# Patient Record
Sex: MALE | Race: WHITE | NOT HISPANIC OR LATINO | Employment: FULL TIME | URBAN - METROPOLITAN AREA
[De-identification: names, ages, dates, MRNs, and addresses within clinical notes are randomized per-mention and may not be internally consistent; named-entity substitution may affect disease eponyms.]

---

## 2023-06-13 ENCOUNTER — TELEPHONE (OUTPATIENT)
Dept: CARDIOLOGY | Facility: CLINIC | Age: 63
End: 2023-06-13

## 2023-06-13 ENCOUNTER — OFFICE VISIT (OUTPATIENT)
Dept: CARDIOLOGY | Facility: CLINIC | Age: 63
End: 2023-06-13
Payer: COMMERCIAL

## 2023-06-13 VITALS
WEIGHT: 194.2 LBS | HEIGHT: 70 IN | RESPIRATION RATE: 20 BRPM | HEART RATE: 71 BPM | DIASTOLIC BLOOD PRESSURE: 90 MMHG | SYSTOLIC BLOOD PRESSURE: 130 MMHG | BODY MASS INDEX: 27.8 KG/M2

## 2023-06-13 DIAGNOSIS — E78.2 MIXED HYPERLIPIDEMIA: ICD-10-CM

## 2023-06-13 DIAGNOSIS — I10 ELEVATED BLOOD PRESSURE READING IN OFFICE WITH DIAGNOSIS OF HYPERTENSION: ICD-10-CM

## 2023-06-13 DIAGNOSIS — R06.02 SHORTNESS OF BREATH: ICD-10-CM

## 2023-06-13 DIAGNOSIS — R94.31 ABNORMAL EKG: Primary | ICD-10-CM

## 2023-06-13 DIAGNOSIS — R00.2 PALPITATIONS: ICD-10-CM

## 2023-06-13 DIAGNOSIS — R73.9 HYPERGLYCEMIA: ICD-10-CM

## 2023-06-13 PROCEDURE — 3080F DIAST BP >= 90 MM HG: CPT | Performed by: INTERNAL MEDICINE

## 2023-06-13 PROCEDURE — 3008F BODY MASS INDEX DOCD: CPT | Performed by: INTERNAL MEDICINE

## 2023-06-13 PROCEDURE — 3075F SYST BP GE 130 - 139MM HG: CPT | Performed by: INTERNAL MEDICINE

## 2023-06-13 PROCEDURE — 93000 ELECTROCARDIOGRAM COMPLETE: CPT | Mod: XU | Performed by: INTERNAL MEDICINE

## 2023-06-13 PROCEDURE — 99204 OFFICE O/P NEW MOD 45 MIN: CPT | Performed by: INTERNAL MEDICINE

## 2023-06-13 RX ORDER — IBUPROFEN 200 MG
200 TABLET ORAL 2 TIMES DAILY PRN
COMMUNITY
End: 2023-07-21 | Stop reason: HOSPADM

## 2023-06-13 RX ORDER — CHOLECALCIFEROL (VITAMIN D3) 25 MCG
1000 TABLET ORAL DAILY
COMMUNITY

## 2023-06-13 ASSESSMENT — ENCOUNTER SYMPTOMS
ORTHOPNEA: 0
LIGHT-HEADEDNESS: 0
SYNCOPE: 0
NEAR-SYNCOPE: 0
DYSPNEA ON EXERTION: 1
SNORING: 1
IRREGULAR HEARTBEAT: 0
PND: 0
DIZZINESS: 0
PALPITATIONS: 1

## 2023-06-13 NOTE — ASSESSMENT & PLAN NOTE
Vishnu had a lipid profile in 2019 in which his total cholesterol was 236, triglycerides 161, HDL was 50 and LDL was 54.    I will have him get a repeat lipid profile.  I have counseled him on a low-fat low-cholesterol diet and given him information about the Mediterranean diet.    I encouraged him to get back to his structured exercise program.    I have also recommended that if his stress test and echo are unremarkable then he should get CT coronary calcium scoring for further risk stratification.

## 2023-06-13 NOTE — ASSESSMENT & PLAN NOTE
His dyspnea may be related to deconditioning.  He he tells me that he had previously been doing the [employer] bike 3-4 times per week up until February when he started a new job.    I will have my get an exercise nuclear stress test to look for any evidence of ischemia as a cause for his symptoms.  He will also get a transthoracic echocardiogram to assess his underlying LV function wall motion and valves.    His EKG today in the office revealed normal sinus rhythm, left atrial enlargement and RSR prime in V1 but was otherwise unremarkable.

## 2023-06-13 NOTE — PROGRESS NOTES
Cardiology Consult/New Patient    Reason for visit: Palpitations    EMMA Murillo presents today for evaluation.  He is a 62-year-old male with history of hyperlipidemia who had gotten up from bed the other night and had palpitations.  He states that it felt like his heart was racing.  He felt lightheaded and dizzy.  No shortness of breath or chest discomfort.  No neurologic or TIA-like symptoms.  He went back to bed and whenever he laid on his left side his symptoms were worse.  When he lied on his back or on his right side his symptoms improved but did not resolve.  The symptoms resolved on their own but the next day he felt weak, tired and states that he looked pale when he looked in the mirror.  His symptoms improved throughout the day and he had been golfing.  He did get some dyspnea on exertion but no chest discomfort.    He had an episode of palpitations several years ago but this was more severe.    He does drink a 5-hour energy drink every day and I will wean him off of this.    Chidi does get some dyspnea on exertion but no chest discomfort, PND, orthopnea, or lower extremity edema.  He has not been to see a physician since he had an appendectomy in 2019.    Vishnu does not have a history of hypertension although his blood pressure is mildly elevated today in the office at 130/90 in both arms.  There is no diabetes, smoking history or family history of premature atherosclerosis or sudden cardiac death.    Medical History:   Past Medical History:   Diagnosis Date   • Mixed hyperlipidemia 6/13/2023   • Palpitations 06/12/2023   • SOB (shortness of breath)        Surgical History:   Past Surgical History:   Procedure Laterality Date   • APPENDECTOMY      2019       Family History:   Family History   Problem Relation Age of Onset   • Multiple sclerosis Biological Mother    • Hyperlipidemia Biological Father    • Hypertension Biological Father    • Diabetes Biological Father         Social History:    Social  History     Tobacco Use   • Smoking status: Never   • Smokeless tobacco: Never   Vaping Use   • Vaping Use: Never used   Substance Use Topics   • Alcohol use: Yes     Comment: socially   • Drug use: Never        Allergies: Augmentin [amoxicillin-pot clavulanate]    Current Outpatient Medications   Medication Instructions   • cholecalciferol (vitamin D3) 1,000 Units, oral, Daily   • ibuprofen (MOTRIN) 200 mg, oral, 2 times daily PRN        Review of Systems  Review of Systems   Cardiovascular: Positive for dyspnea on exertion and palpitations. Negative for chest pain, irregular heartbeat, leg swelling, near-syncope, orthopnea, paroxysmal nocturnal dyspnea and syncope.   Respiratory: Positive for snoring.    Neurological: Negative for dizziness and light-headedness.       Objective     Vitals:    06/13/23 0809   BP: 130/90   Pulse: 71   Resp: 20       Physical Exam  Constitutional:       Appearance: He is well-developed.   Neck:      Vascular: No carotid bruit or JVD.   Cardiovascular:      Rate and Rhythm: Normal rate and regular rhythm.      Pulses:           Carotid pulses are 2+ on the right side and 2+ on the left side.       Dorsalis pedis pulses are 2+ on the right side and 2+ on the left side.        Posterior tibial pulses are 2+ on the right side and 2+ on the left side.      Heart sounds: S1 normal and S2 normal. No murmur heard.     No friction rub. No gallop.   Pulmonary:      Effort: Pulmonary effort is normal.      Breath sounds: Normal breath sounds.   Musculoskeletal:      Left lower leg: No edema.   Skin:     General: Skin is warm and dry.   Neurological:      Mental Status: He is alert.   Psychiatric:         Behavior: Behavior normal.            ECG.  Normal sinus rhythm, RSR prime in V1 and left atrial enlargement.      Assessment/Plan     Palpitations  In order to further evaluate Chidi's symptoms, I will have him wear a Zio patch monitor for 1 week.    He also has not had any blood work in 4  years and I will have him get a CBC, TSH, hemoglobin A1c and CMP.    He will get an echocardiogram to look for any underlying structural heart disease and to assess the size of his left atrium with the possibility of atrial fibrillation, because his symptoms.    I also discussed with him decreasing his caffeine intake.    He does snore but has not had any apneic periods and after his cardiac work-up, I feel that he would benefit from a sleep study for obstructive sleep apnea as he does get fatigued during the day which is why he takes the 5-hour energy drink.    Shortness of breath  His dyspnea may be related to deconditioning.  He he tells me that he had previously been doing the [employer] bike 3-4 times per week up until February when he started a new job.    I will have my get an exercise nuclear stress test to look for any evidence of ischemia as a cause for his symptoms.  He will also get a transthoracic echocardiogram to assess his underlying LV function wall motion and valves.    His EKG today in the office revealed normal sinus rhythm, left atrial enlargement and RSR prime in V1 but was otherwise unremarkable.    Mixed hyperlipidemia  Vishnu had a lipid profile in 2019 in which his total cholesterol was 236, triglycerides 161, HDL was 50 and LDL was 54.    I will have him get a repeat lipid profile.  I have counseled him on a low-fat low-cholesterol diet and given him information about the Mediterranean diet.    I encouraged him to get back to his structured exercise program.    I have also recommended that if his stress test and echo are unremarkable then he should get CT coronary calcium scoring for further risk stratification.    Elevated blood pressure reading in office with diagnosis of hypertension  Chidi's blood pressure was mildly elevated today in the office of 130/90.  Given that this is his first visit, and may be related to anxiety I have not started him on any medication at this time.  I have  counseled him on decreasing the salt in his diet, exercise and weight loss.    He will get a follow-up blood pressure check at the time of his stress test and echocardiogram.    We had a prolonged discussion about these complex clinical issues and went over the various important aspects to consider. All questions were answered.      Chidi will follow up with me after the tests are performed.  I will keep you informed the results as well as his progress.  He will call with any questions or concerns in the interim.    I have also recommended that Chidi get a primary care physician and I have given him the name and number of an internist for him to see.    I spent 45 minutes on this date of service performing the following activities: obtaining history, performing examination, entering orders, documenting, preparing for visit, obtaining / reviewing records, providing counseling and education and communicating results.         Chidi Pham MD  6/13/2023

## 2023-06-13 NOTE — TELEPHONE ENCOUNTER
Pls precert for pharm nuc in [place] 6/19; and    Echo in [facility] on 7/17.  [employer].  Thanks

## 2023-06-13 NOTE — ASSESSMENT & PLAN NOTE
Chidi's blood pressure was mildly elevated today in the office of 130/90.  Given that this is his first visit, and may be related to anxiety I have not started him on any medication at this time.  I have counseled him on decreasing the salt in his diet, exercise and weight loss.    He will get a follow-up blood pressure check at the time of his stress test and echocardiogram.

## 2023-06-13 NOTE — ASSESSMENT & PLAN NOTE
In order to further evaluate Chidi's symptoms, I will have him wear a Zio patch monitor for 1 week.    He also has not had any blood work in 4 years and I will have him get a CBC, TSH, hemoglobin A1c and CMP.    He will get an echocardiogram to look for any underlying structural heart disease and to assess the size of his left atrium with the possibility of atrial fibrillation, because his symptoms.    I also discussed with him decreasing his caffeine intake.    He does snore but has not had any apneic periods and after his cardiac work-up, I feel that he would benefit from a sleep study for obstructive sleep apnea as he does get fatigued during the day which is why he takes the 5-hour energy drink.

## 2023-06-13 NOTE — PATIENT INSTRUCTIONS
Patient Education   Mediterranean Diet  A Mediterranean diet refers to food and lifestyle choices that are based on the traditions of countries located on the [place]. This way of eating has been shown to help prevent certain conditions and improve outcomes forpeople who have chronic diseases, like kidney disease and heart disease.  What are tips for following this plan?  Lifestyle  Cook and eat meals together with your family, when possible.  Drink enough fluid to keep your urine clear or pale yellow.  Be physically active every day. This includes:  Aerobic exercise like running or swimming.  Leisure activities like gardening, walking, or housework.  Get 7-8 hours of sleep each night.  If recommended by your health care provider, drink red wine in moderation. This means 1 glass a day for nonpregnant women and 2 glasses a day for men. A glass of wine equals 5 oz (150 mL).  Reading food labels    Check the serving size of packaged foods. For foods such as rice and pasta, the serving size refers to the amount of cooked product, not dry.  Check the total fat in packaged foods. Avoid foods that have saturated fat or trans fats.  Check the ingredients list for added sugars, such as corn syrup.    Shopping  At the grocery store, buy most of your food from the areas near the walls of the store. This includes:  Fresh fruits and vegetables (produce).  Grains, beans, nuts, and seeds. Some of these may be available in unpackaged forms or large amounts (in bulk).  Fresh seafood.  Poultry and eggs.  Low-fat dairy products.  Buy whole ingredients instead of prepackaged foods.  Buy fresh fruits and vegetables in-season from local farmers markets.  Buy frozen fruits and vegetables in resealable bags.  If you do not have access to quality fresh seafood, buy precooked frozen shrimp or canned fish, such as tuna, salmon, or sardines.  Buy small amounts of raw or cooked vegetables, salads, or olives from the deli or salad bar  at your store.  Stock your pantry so you always have certain foods on hand, such as olive oil, canned tuna, canned tomatoes, rice, pasta, and beans.  Cooking  Cook foods with extra-virgin olive oil instead of using butter or other vegetable oils.  Have meat as a side dish, and have vegetables or grains as your main dish. This means having meat in small portions or adding small amounts of meat to foods like pasta or stew.  Use beans or vegetables instead of meat in common dishes like chili or lasagna.  [place] with different cooking methods. Try roasting or broiling vegetables instead of steaming or sautéeing them.  Add frozen vegetables to soups, stews, pasta, or rice.  Add nuts or seeds for added healthy fat at each meal. You can add these to yogurt, salads, or vegetable dishes.  Marinate fish or vegetables using olive oil, lemon juice, garlic, and fresh herbs.  Meal planning    Plan to eat 1 vegetarian meal one day each week. Try to work up to 2 vegetarian meals, if possible.  Eat seafood 2 or more times a week.  Have healthy snacks readily available, such as:  Vegetable sticks with hummus.  Greek yogurt.  Fruit and nut trail mix.  Eat balanced meals throughout the week. This includes:  Fruit: 2-3 servings a day  Vegetables: 4-5 servings a day  Low-fat dairy: 2 servings a day  Fish, poultry, or lean meat: 1 serving a day  Beans and legumes: 2 or more servings a week  Nuts and seeds: 1-2 servings a day  Whole grains: 6-8 servings a day  Extra-virgin olive oil: 3-4 servings a day  Limit red meat and sweets to only a few servings a month    What are my food choices?  Mediterranean diet  Recommended  Grains: Whole-grain pasta. Brown rice. Bulgar wheat. Polenta. Couscous. Whole-wheat bread. Oatmeal. Quinoa.  Vegetables: Artichokes. Beets. Broccoli. Cabbage. Carrots. Eggplant. Green beans. Chard. Kale. Spinach. Onions. Leeks. Peas. Squash. Tomatoes. Peppers. Radishes.  Fruits: Apples. Apricots. Avocado. Berries.  Bananas. Cherries. Dates. Figs. Grapes. Rosey. Melon. Oranges. Peaches. Plums. Pomegranate.  Meats and other protein foods: Beans. Almonds. Sunflower seeds. Pine nuts. Peanuts. Cod. [place]. Scallops. Shrimp. Tuna. Tilapia. Clams. Oysters. Eggs.  Dairy: Low-fat milk. Cheese. Greek yogurt.  Beverages: Water. Red wine. Herbal tea.  Fats and oils: Extra virgin olive oil. Avocado oil. Grape seed oil.  Sweets and desserts: Greek yogurt with honey. Baked apples. Poached pears. Trail mix.  Seasoning and other foods: Basil. Cilantro. Coriander. Cumin. Mint. Parsley. Celso. Rosemary. Tarragon. Garlic. Oregano. Thyme. Pepper. Balsalmic vinegar. Tahini. Hummus. Tomato sauce. Olives. Mushrooms.  Limit these  Grains: Prepackaged pasta or rice dishes. Prepackaged cereal with added sugar.  Vegetables: Deep fried potatoes (french fries).  Fruits: Fruit canned in syrup.  Meats and other protein foods: Beef. Pork. Lamb. Poultry with skin. Hot dogs. [place].  Dairy: Ice cream. Sour cream. Whole milk.  Beverages: Juice. Sugar-sweetened soft drinks. Beer. Liquor and spirits.  Fats and oils: Butter. Canola oil. Vegetable oil. Beef fat (tallow). Lard.  Sweets and desserts: Cookies. Cakes. Pies. Candy.  Seasoning and other foods: Mayonnaise. Premade sauces and marinades.  The items listed may not be a complete list. Talk with your dietitian aboutwhat dietary choices are right for you.  Summary  The Mediterranean diet includes both food and lifestyle choices.  Eat a variety of fresh fruits and vegetables, beans, nuts, seeds, and whole grains.  Limit the amount of red meat and sweets that you eat.  Talk with your health care provider about whether it is safe for you to drink red wine in moderation. This means 1 glass a day for nonpregnant women and 2 glasses a day for men. A glass of wine equals 5 oz (150 mL).  This information is not intended to replace advice given to you by your health care provider. Make sure you discuss any questions you  have with your healthcare provider.  Document Revised: 08/17/2017 Document Reviewed: 08/10/2017  Elsevier Patient Education © 2020 Elsevier Inc.

## 2023-06-14 NOTE — TELEPHONE ENCOUNTER
NST @ [facility]: NPR per Nicole via ph #[phone]. Call ref #[id number]    Echo @ [facility]: ERICA per Nicole via ph #[phone]. Call ref #[id number]

## 2023-06-16 LAB
BASOPHILS # BLD AUTO: 0 X10E3/UL (ref 0–0.2)
BASOPHILS NFR BLD AUTO: 1 %
EOSINOPHIL # BLD AUTO: 0.2 X10E3/UL (ref 0–0.4)
EOSINOPHIL NFR BLD AUTO: 3 %
ERYTHROCYTE [DISTWIDTH] IN BLOOD BY AUTOMATED COUNT: 12.3 % (ref 11.6–15.4)
HBA1C MFR BLD: 5.3 % (ref 4.8–5.6)
HCT VFR BLD AUTO: 43.1 % (ref 37.5–51)
HGB BLD-MCNC: 14.8 G/DL (ref 13–17.7)
IMM GRANULOCYTES # BLD AUTO: 0 X10E3/UL (ref 0–0.1)
IMM GRANULOCYTES NFR BLD AUTO: 0 %
LYMPHOCYTES # BLD AUTO: 2.3 X10E3/UL (ref 0.7–3.1)
LYMPHOCYTES NFR BLD AUTO: 38 %
MCH RBC QN AUTO: 31.5 PG (ref 26.6–33)
MCHC RBC AUTO-ENTMCNC: 34.3 G/DL (ref 31.5–35.7)
MCV RBC AUTO: 92 FL (ref 79–97)
MONOCYTES # BLD AUTO: 0.6 X10E3/UL (ref 0.1–0.9)
MONOCYTES NFR BLD AUTO: 11 %
NEUTROPHILS # BLD AUTO: 2.8 X10E3/UL (ref 1.4–7)
NEUTROPHILS NFR BLD AUTO: 47 %
PLATELET # BLD AUTO: 248 X10E3/UL (ref 150–450)
RBC # BLD AUTO: 4.7 X10E6/UL (ref 4.14–5.8)
WBC # BLD AUTO: 5.9 X10E3/UL (ref 3.4–10.8)

## 2023-06-17 LAB
ALBUMIN SERPL-MCNC: 4.3 G/DL (ref 3.8–4.8)
ALBUMIN/GLOB SERPL: 1.9 {RATIO} (ref 1.2–2.2)
ALP SERPL-CCNC: 93 IU/L (ref 44–121)
ALT SERPL-CCNC: 17 IU/L (ref 0–44)
AST SERPL-CCNC: 21 IU/L (ref 0–40)
BILIRUB SERPL-MCNC: 0.4 MG/DL (ref 0–1.2)
BUN SERPL-MCNC: 13 MG/DL (ref 8–27)
BUN/CREAT SERPL: 14 (ref 10–24)
CALCIUM SERPL-MCNC: 8.9 MG/DL (ref 8.6–10.2)
CHLORIDE SERPL-SCNC: 102 MMOL/L (ref 96–106)
CHOLEST SERPL-MCNC: 184 MG/DL (ref 100–199)
CO2 SERPL-SCNC: 24 MMOL/L (ref 20–29)
CREAT SERPL-MCNC: 0.93 MG/DL (ref 0.76–1.27)
EGFRCR SERPLBLD CKD-EPI 2021: 93 ML/MIN/1.73
GLOBULIN SER CALC-MCNC: 2.3 G/DL (ref 1.5–4.5)
GLUCOSE SERPL-MCNC: 99 MG/DL (ref 70–99)
HDLC SERPL-MCNC: 50 MG/DL
LDLC SERPL CALC-MCNC: 103 MG/DL (ref 0–99)
POTASSIUM SERPL-SCNC: 4.2 MMOL/L (ref 3.5–5.2)
PROT SERPL-MCNC: 6.6 G/DL (ref 6–8.5)
SODIUM SERPL-SCNC: 138 MMOL/L (ref 134–144)
TRIGL SERPL-MCNC: 177 MG/DL (ref 0–149)
TSH SERPL DL<=0.005 MIU/L-ACNC: 4.05 UIU/ML (ref 0.45–4.5)
VLDLC SERPL CALC-MCNC: 31 MG/DL (ref 5–40)

## 2023-06-19 ENCOUNTER — APPOINTMENT (OUTPATIENT)
Dept: CARDIOLOGY | Facility: CLINIC | Age: 63
Setting detail: NUCLEAR MEDICINE
End: 2023-06-19
Attending: INTERNAL MEDICINE
Payer: COMMERCIAL

## 2023-06-19 ENCOUNTER — HOSPITAL ENCOUNTER (OUTPATIENT)
Dept: CARDIOLOGY | Facility: CLINIC | Age: 63
Setting detail: NUCLEAR MEDICINE
Discharge: HOME | End: 2023-06-19
Attending: INTERNAL MEDICINE
Payer: COMMERCIAL

## 2023-06-19 VITALS — BODY MASS INDEX: 27.77 KG/M2 | WEIGHT: 194 LBS | HEIGHT: 70 IN

## 2023-06-19 DIAGNOSIS — R00.2 PALPITATIONS: ICD-10-CM

## 2023-06-19 DIAGNOSIS — R06.02 SHORTNESS OF BREATH: ICD-10-CM

## 2023-06-19 DIAGNOSIS — R94.31 ABNORMAL EKG: ICD-10-CM

## 2023-06-19 LAB
CV NM TETROFOSMIN REST DOSE: 9.5 MCI
CV NM TETROFOSMIN STRESS DOSE: 31.4 MCI
MLH CV NM REST ADMIN DATE: NORMAL MM/DD/YYYY
MLH CV NM REST ADMIN TIME: 748 HR:MIN
MLH CV NM STRESS ADMIN DATE: NORMAL MM/DD/YYYY
MLH CV NM STRESS ADMIN TIME: 852 HR:MIN
NUC STRESS EJECTION FRACTION: 55 %
STRESS BASELINE BP: NORMAL MMHG
STRESS BASELINE HR: 65 BPM
STRESS ECHO POST RECOVERY HR: 111 BPM
STRESS PERCENT HR: 86 %
STRESS POST ESTIMATED WORKLOAD: 10.1 METS
STRESS POST EXERCISE DUR MIN: 7 MIN
STRESS POST EXERCISE DUR SEC: 45 SEC
STRESS POST PEAK BP: NORMAL MMHG
STRESS POST PEAK HR: 136 BPM
STRESS ST DEPRESSION: 2 MM
STRESS TARGET HR: 134 BPM

## 2023-06-19 PROCEDURE — A9502 TC99M TETROFOSMIN: HCPCS | Performed by: INTERNAL MEDICINE

## 2023-06-19 PROCEDURE — 93015 CV STRESS TEST SUPVJ I&R: CPT | Performed by: INTERNAL MEDICINE

## 2023-06-19 PROCEDURE — 78452 HT MUSCLE IMAGE SPECT MULT: CPT | Performed by: INTERNAL MEDICINE

## 2023-06-26 ENCOUNTER — OFFICE VISIT (OUTPATIENT)
Dept: CARDIOLOGY | Facility: CLINIC | Age: 63
End: 2023-06-26
Payer: COMMERCIAL

## 2023-06-26 ENCOUNTER — TELEPHONE (OUTPATIENT)
Dept: CARDIOLOGY | Facility: CLINIC | Age: 63
End: 2023-06-26
Payer: COMMERCIAL

## 2023-06-26 VITALS
SYSTOLIC BLOOD PRESSURE: 114 MMHG | HEART RATE: 79 BPM | RESPIRATION RATE: 16 BRPM | HEIGHT: 70 IN | WEIGHT: 194 LBS | DIASTOLIC BLOOD PRESSURE: 84 MMHG | BODY MASS INDEX: 27.77 KG/M2 | OXYGEN SATURATION: 98 %

## 2023-06-26 DIAGNOSIS — R00.2 PALPITATIONS: ICD-10-CM

## 2023-06-26 DIAGNOSIS — E78.2 MIXED HYPERLIPIDEMIA: ICD-10-CM

## 2023-06-26 DIAGNOSIS — R94.39 ABNORMAL NUCLEAR STRESS TEST: ICD-10-CM

## 2023-06-26 DIAGNOSIS — R06.02 SHORTNESS OF BREATH: Primary | ICD-10-CM

## 2023-06-26 DIAGNOSIS — I10 ELEVATED BLOOD PRESSURE READING IN OFFICE WITH DIAGNOSIS OF HYPERTENSION: ICD-10-CM

## 2023-06-26 PROCEDURE — 3079F DIAST BP 80-89 MM HG: CPT | Performed by: INTERNAL MEDICINE

## 2023-06-26 PROCEDURE — 93000 ELECTROCARDIOGRAM COMPLETE: CPT | Performed by: INTERNAL MEDICINE

## 2023-06-26 PROCEDURE — 99215 OFFICE O/P EST HI 40 MIN: CPT | Performed by: INTERNAL MEDICINE

## 2023-06-26 PROCEDURE — 3074F SYST BP LT 130 MM HG: CPT | Performed by: INTERNAL MEDICINE

## 2023-06-26 PROCEDURE — 3008F BODY MASS INDEX DOCD: CPT | Performed by: INTERNAL MEDICINE

## 2023-06-26 ASSESSMENT — ENCOUNTER SYMPTOMS
DIZZINESS: 0
IRREGULAR HEARTBEAT: 0
LIGHT-HEADEDNESS: 0
PALPITATIONS: 0
NEAR-SYNCOPE: 0
ORTHOPNEA: 0
SYNCOPE: 0
PND: 0
DYSPNEA ON EXERTION: 1

## 2023-06-26 NOTE — PATIENT INSTRUCTIONS
Patient Education   Coronary Angiogram With Stent  Coronary angiogram with stent placement is a procedure to widen or open a narrow blood vessel of the heart (coronary artery). Arteries may become blocked by cholesterol buildup (plaques) in the lining of the artery wall. When a coronary artery becomes partially blocked, blood flow to that area decreases. This may lead to chest pain or a heart attack (myocardial infarction).  A stent is a small piece of metal that looks like mesh or spring. Stent placement may be done as treatment after a heart attack, or to prevent a heartattack if a blocked artery is found by a coronary angiogram.  Let your health care provider know about:  Any allergies you have, including allergies to medicines or contrast dye.  All medicines you are taking, including vitamins, herbs, eye drops, creams, and over-the-counter medicines.  Any problems you or family members have had with anesthetic medicines.  Any blood disorders you have.  Any surgeries you have had.  Any medical conditions you have, including kidney problems or kidney failure.  Whether you are pregnant or may be pregnant.  Whether you are breastfeeding.  What are the risks?  Generally, this is a safe procedure. However, serious problems may occur, including:  Damage to nearby structures or organs, such as the heart, blood vessels, or kidneys.  A return of blockage.  Bleeding, infection, or bruising at the insertion site.  A collection of blood under the skin (hematoma) at the insertion site.  A blood clot in another part of the body.  Allergic reaction to medicines or dyes.  Bleeding into the abdomen (retroperitoneal bleeding).  Stroke (rare).  Heart attack (rare).  What happens before the procedure?  Staying hydrated  Follow instructions from your health care provider about hydration, which may include:  Up to 2 hours before the procedure - you may continue to drink clear liquids, such as water, clear fruit juice, black coffee,  and plain tea.    Eating and drinking restrictions  Follow instructions from your health care provider about eating and drinking, which may include:  8 hours before the procedure - stop eating heavy meals or foods, such as meat, fried foods, or fatty foods.  6 hours before the procedure - stop eating light meals or foods, such as toast or cereal.  2 hours before the procedure - stop drinking clear liquids.  Medicines  Ask your health care provider about:  Changing or stopping your regular medicines. This is especially important if you are taking diabetes medicines or blood thinners.  Taking medicines such as aspirin and ibuprofen. These medicines can thin your blood. Do not take these medicines unless your health care provider tells you to take them.  Generally, aspirin is recommended before a thin tube, called a catheter, is passed through a blood vessel and inserted into the heart (cardiac catheterization).  Taking over-the-counter medicines, vitamins, herbs, and supplements.  General instructions  Do not use any products that contain nicotine or tobacco for at least 4 weeks before the procedure. These products include cigarettes, e-cigarettes, and chewing tobacco. If you need help quitting, ask your health care provider.  Plan to have someone take you home from the hospital or clinic.  If you will be going home right after the procedure, plan to have someone with you for 24 hours.  You may have tests and imaging procedures.  Ask your health care provider:  How your insertion site will be marked. Ask which artery will be used for the procedure.  What steps will be taken to help prevent infection. These may include:  Removing hair at the insertion site.  Washing skin with a germ-killing soap.  Taking antibiotic medicine.  What happens during the procedure?    An IV will be inserted into one of your veins.  Electrodes may be placed on your chest to monitor your heart rate during the procedure.  You will be given one  or more of the following:  A medicine to help you relax (sedative).  A medicine to numb the area (local anesthetic) for catheter insertion.  A small incision will be made for catheter insertion.  The catheter will be inserted into an artery using a guide wire. The location may be in your groin, your wrist, or the fold of your arm (near your elbow).  An X-ray procedure (fluoroscopy) will be used to help guide the catheter to the opening of the heart arteries.  A dye will be injected into the catheter. X-rays will be taken. The dye helps to show where any narrowing or blockages are located in the arteries.  Tell your health care provider if you have chest pain or trouble breathing.  A tiny wire will be guided to the blocked spot, and a balloon will be inflated to make the artery wider.  The stent will be expanded to crush the plaques into the wall of the vessel. The stent will hold the area open and improve the blood flow. Most stents have a drug coating to reduce the risk of the stent narrowing over time.  The artery may be made wider using a drill, laser, or other tools that remove plaques.  The catheter will be removed when the blood flow improves. The stent will stay where it was placed, and the lining of the artery will grow over it.  A bandage (dressing) will be placed on the insertion site. Pressure will be applied to stop bleeding.  The IV will be removed.  This procedure may vary among health care providers and hospitals.  What happens after the procedure?  Your blood pressure, heart rate, breathing rate, and blood oxygen level will be monitored until you leave the hospital or clinic.  If the procedure is done through the leg, you will lie flat in bed for a few hours or for as long as told by your health care provider. You will be instructed not to bend or cross your legs.  The insertion site and the pulse in your foot or wrist will be checked often.  You may have more blood tests, X-rays, and a test that  records the electrical activity of your heart (electrocardiogram, or ECG).  Do not drive for 24 hours if you were given a sedative during your procedure.  Summary  Coronary angiogram with stent placement is a procedure to widen or open a narrowed coronary artery. This is done to treat heart problems.  Before the procedure, let your health care provider know about all the medical conditions and surgeries you have or have had.  This is a safe procedure. However, some problems may occur, including damage to nearby structures or organs, bleeding, blood clots, or allergies.  Follow your health care provider's instructions about eating, drinking, medicines, and other lifestyle changes, such as quitting tobacco use before the procedure.  This information is not intended to replace advice given to you by your health care provider. Make sure you discuss any questions you have with your healthcare provider.  Document Revised: 07/08/2020 Document Reviewed: 07/08/2020  Elsevier Patient Education © 2022 Elsevier Inc.

## 2023-06-26 NOTE — ASSESSMENT & PLAN NOTE
Given like symptoms, abnormal stress test, we will plan for him to undergo left heart cath and coronary angiography.  I will arrange for him to have this performed at [facility].  I have discussed the risks and benefits.  I have given him information about cardiac catheterization and PCI.

## 2023-06-26 NOTE — ASSESSMENT & PLAN NOTE
Recent lipid profile revealed a total cholesterol of 184, triglycerides 177, HDL was 50 and LDL was 103.    I counseled Chidi on the continued need for following a Mediterranean diet and beginning a structured exercise program after his cardiac catheterization.

## 2023-06-26 NOTE — H&P (VIEW-ONLY)
Cardiology Note       Reason for visit: Abnormal nuclear stress test      Vishnu returns today for follow-up accompanied by his wife.  He had undergone an exercise nuclear stress test for atypical chest pain and shortness of breath.  The stress test was abnormal in both the EKG and nuclear imaging or positive for ischemia.  There was a moderate size anterior septal defect with mild reversibility as well in the apical septum.  The ejection fraction was 55%.    Clinically, Vishnu has been getting some dyspnea on exertion and he felt that he had some left upper chest tightness over the weekend with exertion.  He has not had any rest or nocturnal symptoms.    I had called Chidi and reviewed the results with him and he came in today and we discussed further diagnostic and treatment options including medical management, CT coronary angiography and left heart cath and cardiac catheterization.  I explained the risk, benefits of each.  He understands and would like to proceed with cardiac catheterization.        Past Medical History:   Diagnosis Date   • Mixed hyperlipidemia 6/13/2023   • Palpitations 06/12/2023   • SOB (shortness of breath)      Past Surgical History:   Procedure Laterality Date   • APPENDECTOMY      2019     Social History     Tobacco Use   • Smoking status: Never   • Smokeless tobacco: Never   Vaping Use   • Vaping Use: Never used   Substance Use Topics   • Alcohol use: Yes     Comment: socially   • Drug use: Never      Family History   Problem Relation Age of Onset   • Multiple sclerosis Biological Mother    • Hyperlipidemia Biological Father    • Hypertension Biological Father    • Diabetes Biological Father      Augmentin [amoxicillin-pot clavulanate]  Current Outpatient Medications   Medication Instructions   • acetylcysteine (NAC ORAL) 1,000 mg, oral, Daily   • ascorbic acid/multivit-min (EMERGEN-C ORAL) 1 packet, oral, Daily   • cholecalciferol (vitamin D3) 1,000 Units, oral, Daily   • ibuprofen  (MOTRIN) 200 mg, oral, 2 times daily PRN   • mv-min/vit C/glut/lysine/hb124 (IMMUNE SUPPORT ORAL) 1 tablet, oral, Daily   • vitamin B complex (B COMPLEX ORAL) 1 tablet, oral, Daily          Review of Systems   Cardiovascular: Positive for dyspnea on exertion. Negative for chest pain, irregular heartbeat, leg swelling, near-syncope, orthopnea, palpitations, paroxysmal nocturnal dyspnea and syncope.   Neurological: Negative for dizziness and light-headedness.      Objective    Vitals:    06/26/23 0814   BP: 114/84   Pulse: 79   Resp: 16   SpO2: 98%      Wt Readings from Last 3 Encounters:   06/26/23 88 kg (194 lb)   06/19/23 88 kg (194 lb)   06/13/23 88.1 kg (194 lb 3.2 oz)      Physical Exam  Constitutional:       Appearance: He is well-developed.   Neck:      Vascular: No carotid bruit or JVD.   Cardiovascular:      Rate and Rhythm: Normal rate and regular rhythm.      Pulses:           Carotid pulses are 2+ on the right side and 2+ on the left side.       Dorsalis pedis pulses are 2+ on the right side and 2+ on the left side.        Posterior tibial pulses are 2+ on the right side and 2+ on the left side.      Heart sounds: S1 normal and S2 normal. No murmur heard.     No friction rub. No gallop.   Pulmonary:      Effort: Pulmonary effort is normal.      Breath sounds: Normal breath sounds.   Skin:     General: Skin is warm and dry.   Neurological:      Mental Status: He is alert.   Psychiatric:         Behavior: Behavior normal.                     Exercise nuclear stress test.  6/19/2023.  • This is an abnormal exercise nuclear stress test.  • EKG with 2-2.5 mm upsloping ST depression in the inferolateral leads starting at 6:16 of exercise.  Lateral lead ST depression appears slightly more horizontal.  This persisted until approximately 2 minutes of recovery.  • No exercise-induced arrhythmia.  Good exercise capacity.  • Perfusion images are technically difficult secondary to artifact.  • There is a  moderate-sized area of moderately to severely reduced isotope uptake in the anteroseptum with mild reversibility at rest.  • There is a small area of moderately reduced isotope uptake in the apical septum with mild improvement at rest.  • The inferior wall has mildly reduced uptake on resting images only.  • LVEF 55%.  Grossly normal wall motion.  • Overall findings are suggestive of ischemia in the territory of the left anterior descending coronary artery.    ECG.  Normal sinus rhythm, RSR prime in V1 and nonspecific T wave abnormality.  Compared to previous EKG nonspecific T wave abnormality is now present.     Assessment/Plan    Elevated blood pressure reading in office with diagnosis of hypertension  Blood pressure today in the office was well controlled.  We will continue to monitor him clinically.    Shortness of breath  Given like symptoms, abnormal stress test, we will plan for him to undergo left heart cath and coronary angiography.  I will arrange for him to have this performed at [facility].  I have discussed the risks and benefits.  I have given him information about cardiac catheterization and PCI.    Mixed hyperlipidemia  Recent lipid profile revealed a total cholesterol of 184, triglycerides 177, HDL was 50 and LDL was 103.    I counseled Chidi on the continued need for following a Mediterranean diet and beginning a structured exercise program after his cardiac catheterization.    Palpitations  No recent palpitations.  Chidi will undergo cardiac catheterization for further evaluation given his abnormal stress test.    We had a prolonged discussion about these complex clinical issues and went over the various important aspects to consider. All questions were answered.    Chidi will follow up with me after the cardiac catheterization.  I will keep you informed the results as well as his progress.  He will call with any questions or concerns in the interim.            Thank you for allowing me to  participate in the care of this patient.  If you have any questions please don't hesitate to contact me.    I spent 45 minutes on this date of service performing the following activities: obtaining history, performing examination, entering orders, documenting, preparing for visit, obtaining / reviewing records, providing counseling and education and communicating results.     Chidi Pham MD [facility]   6/26/2023  9:11 AM

## 2023-06-26 NOTE — PROGRESS NOTES
Cardiology Note       Reason for visit: Abnormal nuclear stress test      Vishnu returns today for follow-up accompanied by his wife.  He had undergone an exercise nuclear stress test for atypical chest pain and shortness of breath.  The stress test was abnormal in both the EKG and nuclear imaging or positive for ischemia.  There was a moderate size anterior septal defect with mild reversibility as well in the apical septum.  The ejection fraction was 55%.    Clinically, Vishnu has been getting some dyspnea on exertion and he felt that he had some left upper chest tightness over the weekend with exertion.  He has not had any rest or nocturnal symptoms.    I had called Chidi and reviewed the results with him and he came in today and we discussed further diagnostic and treatment options including medical management, CT coronary angiography and left heart cath and cardiac catheterization.  I explained the risk, benefits of each.  He understands and would like to proceed with cardiac catheterization.        Past Medical History:   Diagnosis Date   • Mixed hyperlipidemia 6/13/2023   • Palpitations 06/12/2023   • SOB (shortness of breath)      Past Surgical History:   Procedure Laterality Date   • APPENDECTOMY      2019     Social History     Tobacco Use   • Smoking status: Never   • Smokeless tobacco: Never   Vaping Use   • Vaping Use: Never used   Substance Use Topics   • Alcohol use: Yes     Comment: socially   • Drug use: Never      Family History   Problem Relation Age of Onset   • Multiple sclerosis Biological Mother    • Hyperlipidemia Biological Father    • Hypertension Biological Father    • Diabetes Biological Father      Augmentin [amoxicillin-pot clavulanate]  Current Outpatient Medications   Medication Instructions   • acetylcysteine (NAC ORAL) 1,000 mg, oral, Daily   • ascorbic acid/multivit-min (EMERGEN-C ORAL) 1 packet, oral, Daily   • cholecalciferol (vitamin D3) 1,000 Units, oral, Daily   • ibuprofen  (MOTRIN) 200 mg, oral, 2 times daily PRN   • mv-min/vit C/glut/lysine/hb124 (IMMUNE SUPPORT ORAL) 1 tablet, oral, Daily   • vitamin B complex (B COMPLEX ORAL) 1 tablet, oral, Daily          Review of Systems   Cardiovascular: Positive for dyspnea on exertion. Negative for chest pain, irregular heartbeat, leg swelling, near-syncope, orthopnea, palpitations, paroxysmal nocturnal dyspnea and syncope.   Neurological: Negative for dizziness and light-headedness.      Objective    Vitals:    06/26/23 0814   BP: 114/84   Pulse: 79   Resp: 16   SpO2: 98%      Wt Readings from Last 3 Encounters:   06/26/23 88 kg (194 lb)   06/19/23 88 kg (194 lb)   06/13/23 88.1 kg (194 lb 3.2 oz)      Physical Exam  Constitutional:       Appearance: He is well-developed.   Neck:      Vascular: No carotid bruit or JVD.   Cardiovascular:      Rate and Rhythm: Normal rate and regular rhythm.      Pulses:           Carotid pulses are 2+ on the right side and 2+ on the left side.       Dorsalis pedis pulses are 2+ on the right side and 2+ on the left side.        Posterior tibial pulses are 2+ on the right side and 2+ on the left side.      Heart sounds: S1 normal and S2 normal. No murmur heard.     No friction rub. No gallop.   Pulmonary:      Effort: Pulmonary effort is normal.      Breath sounds: Normal breath sounds.   Skin:     General: Skin is warm and dry.   Neurological:      Mental Status: He is alert.   Psychiatric:         Behavior: Behavior normal.                     Exercise nuclear stress test.  6/19/2023.  • This is an abnormal exercise nuclear stress test.  • EKG with 2-2.5 mm upsloping ST depression in the inferolateral leads starting at 6:16 of exercise.  Lateral lead ST depression appears slightly more horizontal.  This persisted until approximately 2 minutes of recovery.  • No exercise-induced arrhythmia.  Good exercise capacity.  • Perfusion images are technically difficult secondary to artifact.  • There is a  moderate-sized area of moderately to severely reduced isotope uptake in the anteroseptum with mild reversibility at rest.  • There is a small area of moderately reduced isotope uptake in the apical septum with mild improvement at rest.  • The inferior wall has mildly reduced uptake on resting images only.  • LVEF 55%.  Grossly normal wall motion.  • Overall findings are suggestive of ischemia in the territory of the left anterior descending coronary artery.    ECG.  Normal sinus rhythm, RSR prime in V1 and nonspecific T wave abnormality.  Compared to previous EKG nonspecific T wave abnormality is now present.     Assessment/Plan    Elevated blood pressure reading in office with diagnosis of hypertension  Blood pressure today in the office was well controlled.  We will continue to monitor him clinically.    Shortness of breath  Given like symptoms, abnormal stress test, we will plan for him to undergo left heart cath and coronary angiography.  I will arrange for him to have this performed at [facility].  I have discussed the risks and benefits.  I have given him information about cardiac catheterization and PCI.    Mixed hyperlipidemia  Recent lipid profile revealed a total cholesterol of 184, triglycerides 177, HDL was 50 and LDL was 103.    I counseled Chidi on the continued need for following a Mediterranean diet and beginning a structured exercise program after his cardiac catheterization.    Palpitations  No recent palpitations.  Chidi will undergo cardiac catheterization for further evaluation given his abnormal stress test.    We had a prolonged discussion about these complex clinical issues and went over the various important aspects to consider. All questions were answered.    Chidi will follow up with me after the cardiac catheterization.  I will keep you informed the results as well as his progress.  He will call with any questions or concerns in the interim.            Thank you for allowing me to  participate in the care of this patient.  If you have any questions please don't hesitate to contact me.    I spent 45 minutes on this date of service performing the following activities: obtaining history, performing examination, entering orders, documenting, preparing for visit, obtaining / reviewing records, providing counseling and education and communicating results.     Chidi Pham MD [facility]   6/26/2023  9:11 AM

## 2023-06-26 NOTE — ASSESSMENT & PLAN NOTE
No recent palpitations.  Chidi will undergo cardiac catheterization for further evaluation given his abnormal stress test.

## 2023-06-26 NOTE — ASSESSMENT & PLAN NOTE
Blood pressure today in the office was well controlled.  We will continue to monitor him clinically.

## 2023-06-27 DIAGNOSIS — R94.31 ABNORMAL EKG: ICD-10-CM

## 2023-06-27 DIAGNOSIS — R06.02 SHORTNESS OF BREATH: ICD-10-CM

## 2023-06-27 DIAGNOSIS — R00.2 PALPITATIONS: ICD-10-CM

## 2023-07-06 ENCOUNTER — HOSPITAL ENCOUNTER (OUTPATIENT)
Facility: HOSPITAL | Age: 63
Setting detail: HOSPITAL OUTPATIENT SURGERY
Discharge: HOME | End: 2023-07-06
Attending: INTERNAL MEDICINE | Admitting: INTERNAL MEDICINE
Payer: COMMERCIAL

## 2023-07-06 ENCOUNTER — APPOINTMENT (OUTPATIENT)
Dept: RADIOLOGY | Facility: HOSPITAL | Age: 63
Setting detail: HOSPITAL OUTPATIENT SURGERY
End: 2023-07-06
Attending: NURSE PRACTITIONER
Payer: COMMERCIAL

## 2023-07-06 ENCOUNTER — APPOINTMENT (OUTPATIENT)
Dept: CARDIOLOGY | Facility: HOSPITAL | Age: 63
Setting detail: HOSPITAL OUTPATIENT SURGERY
End: 2023-07-06
Attending: STUDENT IN AN ORGANIZED HEALTH CARE EDUCATION/TRAINING PROGRAM
Payer: COMMERCIAL

## 2023-07-06 VITALS
OXYGEN SATURATION: 97 % | DIASTOLIC BLOOD PRESSURE: 81 MMHG | HEART RATE: 66 BPM | RESPIRATION RATE: 19 BRPM | HEIGHT: 70 IN | BODY MASS INDEX: 27.06 KG/M2 | WEIGHT: 189 LBS | SYSTOLIC BLOOD PRESSURE: 125 MMHG

## 2023-07-06 DIAGNOSIS — R06.02 SHORTNESS OF BREATH: ICD-10-CM

## 2023-07-06 DIAGNOSIS — R94.39 ABNORMAL NUCLEAR STRESS TEST: ICD-10-CM

## 2023-07-06 DIAGNOSIS — I25.10 CORONARY ARTERY DISEASE INVOLVING NATIVE CORONARY ARTERY OF NATIVE HEART WITHOUT ANGINA PECTORIS: Primary | ICD-10-CM

## 2023-07-06 LAB
ABO + RH BLD: NORMAL
ALBUMIN SERPL-MCNC: 4 G/DL (ref 3.5–5.7)
ALP SERPL-CCNC: 69 IU/L (ref 34–104)
ALT SERPL-CCNC: 17 IU/L (ref 7–52)
ANION GAP SERPL CALC-SCNC: 5 MEQ/L (ref 3–15)
AORTIC ROOT ANNULUS - M-MODE: 2.8 CM
APTT PPP: 33 SEC (ref 23–35)
AST SERPL-CCNC: 21 IU/L (ref 13–39)
AV PEAK GRADIENT: 4 MMHG
AV PEAK VELOCITY-S: 1.04 M/S
BILIRUB SERPL-MCNC: 0.5 MG/DL (ref 0.3–1)
BLD GP AB SCN SERPL QL: NEGATIVE
BLOOD BANK CMNT PATIENT-IMP: NORMAL
BNP SERPL-MCNC: 29 PG/ML
BSA FOR ECHO PROCEDURE: 2.06 M2
BUN SERPL-MCNC: 13 MG/DL (ref 7–25)
CALCIUM SERPL-MCNC: 8.6 MG/DL (ref 8.6–10.3)
CHLORIDE SERPL-SCNC: 106 MEQ/L (ref 98–107)
CO2 SERPL-SCNC: 28 MEQ/L (ref 21–31)
CREAT SERPL-MCNC: 0.9 MG/DL (ref 0.7–1.3)
CUSP SEPARATION: 2.2 CM
D AG BLD QL: NEGATIVE
E WAVE DECELERATION TIME: 187 MS
E/A RATIO: 0.8
E/E' RATIO: 9.4
E/LAT E' RATIO: 5
ERYTHROCYTE [DISTWIDTH] IN BLOOD BY AUTOMATED COUNT: 11.9 % (ref 11.6–14.4)
FRACTIONAL SHORTENING: 21.68 %
GFR SERPL CREATININE-BSD FRML MDRD: >60 ML/MIN/1.73M*2
GLUCOSE SERPL-MCNC: 105 MG/DL (ref 70–99)
HCT VFR BLDCO AUTO: 40.4 % (ref 40.1–51)
HEART RATE: 65 BPM
HGB BLD-MCNC: 14.3 G/DL (ref 13.7–17.5)
INR PPP: 1.1
INTERVENTRICULAR SEPTUM: 0.66 CM
LA/AORTA RATIO: 1.36
LABORATORY COMMENT REPORT: NORMAL
LAD 2D - M-MODE: 3.8 CM
LEFT INTERNAL DIMENSION IN SYSTOLE: 3.36 CM (ref 3.03–4.59)
LEFT VENTRICULAR INTERNAL DIMENSION IN DIASTOLE: 4.29 CM (ref 5.16–7.17)
LEFT VENTRICULAR POSTERIOR WALL IN END DIASTOLE: 0.81 CM (ref 0.66–1.24)
LVOT MG: 2 MMHG
LVOT MV: 0.62 M/S
LVOT PEAK VELOCITY: 0.87 M/S
LVOT PG: 3 MMHG
LVOT VTI: 20.9 CM
MCH RBC QN AUTO: 31.8 PG (ref 28–33.2)
MCHC RBC AUTO-ENTMCNC: 35.4 G/DL (ref 32.2–36.5)
MCV RBC AUTO: 89.8 FL (ref 83–98)
MRSA DNA SPEC QL NAA+PROBE: NEGATIVE
MV E'TISSUE VEL-LAT: 0.12 M/S
MV E'TISSUE VEL-MED: 0.07 M/S
MV PEAK A VEL: 0.74 M/S
MV PEAK E VEL: 0.62 M/S
PDW BLD AUTO: 9.4 FL (ref 9.4–12.4)
PLATELET # BLD AUTO: 230 K/UL (ref 150–350)
POSTERIOR WALL: 0.81 CM
POTASSIUM SERPL-SCNC: 4.1 MEQ/L (ref 3.5–5.1)
PROT SERPL-MCNC: 6.3 G/DL (ref 6.4–8.9)
PROTHROMBIN TIME: 14.3 SEC (ref 12.2–14.5)
RBC # BLD AUTO: 4.5 M/UL (ref 4.5–5.8)
RVOT VMAX: 0.71 M/S
RVOT VTI: 13.4 CM
SODIUM SERPL-SCNC: 139 MEQ/L (ref 136–145)
SPECIMEN EXP DATE BLD: NORMAL
T4 FREE SERPL-MCNC: 0.88 NG/DL (ref 0.58–1.64)
TSH SERPL DL<=0.05 MIU/L-ACNC: 2.85 MIU/L (ref 0.34–5.6)
WBC # BLD AUTO: 5.33 K/UL (ref 3.8–10.5)
Z-SCORE OF LEFT VENTRICULAR DIMENSION IN END DIASTOLE: -3.49
Z-SCORE OF LEFT VENTRICULAR DIMENSION IN END SYSTOLE: -0.82
Z-SCORE OF LEFT VENTRICULAR POSTERIOR WALL IN END DIASTOLE: -0.57

## 2023-07-06 PROCEDURE — 85027 COMPLETE CBC AUTOMATED: CPT | Performed by: NURSE PRACTITIONER

## 2023-07-06 PROCEDURE — 25000000 HC PHARMACY GENERAL: Performed by: INTERNAL MEDICINE

## 2023-07-06 PROCEDURE — C8929 TTE W OR WO FOL WCON,DOPPLER: HCPCS

## 2023-07-06 PROCEDURE — 36415 COLL VENOUS BLD VENIPUNCTURE: CPT | Performed by: THORACIC SURGERY (CARDIOTHORACIC VASCULAR SURGERY)

## 2023-07-06 PROCEDURE — 83880 ASSAY OF NATRIURETIC PEPTIDE: CPT | Performed by: NURSE PRACTITIONER

## 2023-07-06 PROCEDURE — 93880 EXTRACRANIAL BILAT STUDY: CPT

## 2023-07-06 PROCEDURE — 93458 L HRT ARTERY/VENTRICLE ANGIO: CPT | Mod: 26 | Performed by: INTERNAL MEDICINE

## 2023-07-06 PROCEDURE — 63600000 HC DRUGS/DETAIL CODE: Performed by: INTERNAL MEDICINE

## 2023-07-06 PROCEDURE — 93458 L HRT ARTERY/VENTRICLE ANGIO: CPT | Performed by: INTERNAL MEDICINE

## 2023-07-06 PROCEDURE — 71046 X-RAY EXAM CHEST 2 VIEWS: CPT

## 2023-07-06 PROCEDURE — C1894 INTRO/SHEATH, NON-LASER: HCPCS | Performed by: INTERNAL MEDICINE

## 2023-07-06 PROCEDURE — 99153 MOD SED SAME PHYS/QHP EA: CPT | Performed by: INTERNAL MEDICINE

## 2023-07-06 PROCEDURE — C1769 GUIDE WIRE: HCPCS | Performed by: INTERNAL MEDICINE

## 2023-07-06 PROCEDURE — 4A023N7 MEASUREMENT OF CARDIAC SAMPLING AND PRESSURE, LEFT HEART, PERCUTANEOUS APPROACH: ICD-10-PCS | Performed by: INTERNAL MEDICINE

## 2023-07-06 PROCEDURE — 84443 ASSAY THYROID STIM HORMONE: CPT | Performed by: NURSE PRACTITIONER

## 2023-07-06 PROCEDURE — 71000001 HC PACU PHASE 1 INITIAL 30MIN: Performed by: INTERNAL MEDICINE

## 2023-07-06 PROCEDURE — 80053 COMPREHEN METABOLIC PANEL: CPT | Performed by: NURSE PRACTITIONER

## 2023-07-06 PROCEDURE — 84439 ASSAY OF FREE THYROXINE: CPT | Performed by: NURSE PRACTITIONER

## 2023-07-06 PROCEDURE — 27200000 HC STERILE SUPPLY: Performed by: INTERNAL MEDICINE

## 2023-07-06 PROCEDURE — B2111ZZ FLUOROSCOPY OF MULTIPLE CORONARY ARTERIES USING LOW OSMOLAR CONTRAST: ICD-10-PCS | Performed by: INTERNAL MEDICINE

## 2023-07-06 PROCEDURE — 87641 MR-STAPH DNA AMP PROBE: CPT | Performed by: NURSE PRACTITIONER

## 2023-07-06 PROCEDURE — 71000011 HC PACU PHASE 1 EA ADDL MIN: Performed by: INTERNAL MEDICINE

## 2023-07-06 PROCEDURE — C1887 CATHETER, GUIDING: HCPCS | Performed by: INTERNAL MEDICINE

## 2023-07-06 PROCEDURE — 63600105 HC IODINE BASED CONTRAST: Performed by: INTERNAL MEDICINE

## 2023-07-06 PROCEDURE — 99152 MOD SED SAME PHYS/QHP 5/>YRS: CPT | Performed by: INTERNAL MEDICINE

## 2023-07-06 PROCEDURE — 63700000 HC SELF-ADMINISTRABLE DRUG: Performed by: INTERNAL MEDICINE

## 2023-07-06 PROCEDURE — 86900 BLOOD TYPING SEROLOGIC ABO: CPT

## 2023-07-06 PROCEDURE — 93005 ELECTROCARDIOGRAM TRACING: CPT | Performed by: NURSE PRACTITIONER

## 2023-07-06 PROCEDURE — 93306 TTE W/DOPPLER COMPLETE: CPT | Mod: 26 | Performed by: INTERNAL MEDICINE

## 2023-07-06 PROCEDURE — 25500000 HC DRUGS/INCIDENT RAD: Mod: JZ | Performed by: INTERNAL MEDICINE

## 2023-07-06 PROCEDURE — 85610 PROTHROMBIN TIME: CPT | Performed by: NURSE PRACTITIONER

## 2023-07-06 PROCEDURE — 99999 PR OFFICE/OUTPT VISIT,PROCEDURE ONLY: CPT | Performed by: NURSE PRACTITIONER

## 2023-07-06 PROCEDURE — 99205 OFFICE O/P NEW HI 60 MIN: CPT | Performed by: THORACIC SURGERY (CARDIOTHORACIC VASCULAR SURGERY)

## 2023-07-06 PROCEDURE — 85730 THROMBOPLASTIN TIME PARTIAL: CPT | Performed by: NURSE PRACTITIONER

## 2023-07-06 RX ORDER — ASPIRIN 325 MG
325 TABLET ORAL ONCE
Status: COMPLETED | OUTPATIENT
Start: 2023-07-06 | End: 2023-07-06

## 2023-07-06 RX ORDER — ROSUVASTATIN CALCIUM 20 MG/1
20 TABLET, FILM COATED ORAL DAILY
Qty: 30 TABLET | Refills: 3 | Status: ON HOLD | OUTPATIENT
Start: 2023-07-06 | End: 2023-07-18 | Stop reason: SDUPTHER

## 2023-07-06 RX ORDER — LIDOCAINE HYDROCHLORIDE 10 MG/ML
INJECTION, SOLUTION INFILTRATION; PERINEURAL
Status: DISCONTINUED | OUTPATIENT
Start: 2023-07-06 | End: 2023-07-06 | Stop reason: HOSPADM

## 2023-07-06 RX ORDER — MIDAZOLAM HYDROCHLORIDE 2 MG/2ML
INJECTION, SOLUTION INTRAMUSCULAR; INTRAVENOUS
Status: DISCONTINUED | OUTPATIENT
Start: 2023-07-06 | End: 2023-07-06 | Stop reason: HOSPADM

## 2023-07-06 RX ORDER — ASPIRIN 81 MG/1
81 TABLET ORAL DAILY
Qty: 30 TABLET | Refills: 3 | Status: ON HOLD | OUTPATIENT
Start: 2023-07-06 | End: 2023-07-18 | Stop reason: SDUPTHER

## 2023-07-06 RX ORDER — IODIXANOL 320 MG/ML
INJECTION, SOLUTION INTRAVASCULAR
Status: DISCONTINUED | OUTPATIENT
Start: 2023-07-06 | End: 2023-07-06 | Stop reason: HOSPADM

## 2023-07-06 RX ORDER — HEPARIN SODIUM 1000 [USP'U]/ML
INJECTION, SOLUTION INTRAVENOUS; SUBCUTANEOUS
Status: DISCONTINUED | OUTPATIENT
Start: 2023-07-06 | End: 2023-07-06 | Stop reason: HOSPADM

## 2023-07-06 RX ORDER — FENTANYL CITRATE 50 UG/ML
INJECTION, SOLUTION INTRAMUSCULAR; INTRAVENOUS
Status: DISCONTINUED | OUTPATIENT
Start: 2023-07-06 | End: 2023-07-06 | Stop reason: HOSPADM

## 2023-07-06 RX ADMIN — ASPIRIN 325 MG: 325 TABLET ORAL at 06:57

## 2023-07-06 RX ADMIN — PERFLUTREN: 6.52 INJECTION, SUSPENSION INTRAVENOUS at 11:22

## 2023-07-06 NOTE — Clinical Note
Patient placed on procedure table in supine position with right arm extended. Positioning devices: all pressure points padded and safety strap applied.

## 2023-07-06 NOTE — PRE-PROCEDURE NOTE
Cardiac Cath Lab Pre-procedure Note    - Patient was seen and examined at bedside.  - The patient's chart and all data was reviewed.  - The procedure, treatment alternatives, risks and benefits were explained with specific risks discussed.  - Patient was consented for cardiac cath procedure and possible PCI.    Indication for procedure: Pre-Op Diagnosis Codes:     * Shortness of breath (R06.02)     * Abnormal nuclear stress test (R94.39)    Patient's clinical presentation to the cardiac cath lab: stable ischemic symptoms.    Patient is at risk for stroke due to extensive atherosclerotic vascular disease; acute myocardial infarction; bleeding; renal failure potentially requiring dialysis due to the presence of pre-procedural abnormal renal function and allergic reaction including anaphylaxis due to known history of allergy to contrast agents containing iodine.   Patient appears to be managing well.     Notable Non-Invasive Cardiac Testing    - Stress Test: positive nuclear stress test, 6/19/23  • This is an abnormal exercise nuclear stress test.  • EKG with 2-2.5 mm upsloping ST depression in the inferolateral leads starting at 6:16 of exercise.  Lateral lead ST depression appears slightly more horizontal.  This persisted until approximately 2 minutes of recovery.  • No exercise-induced arrhythmia.  Good exercise capacity.  • Perfusion images are technically difficult secondary to artifact.  • There is a moderate-sized area of moderately to severely reduced isotope uptake in the anteroseptum with mild reversibility at rest.  • There is a small area of moderately reduced isotope uptake in the apical septum with mild improvement at rest.  • The inferior wall has mildly reduced uptake on resting images only.  • LVEF 55%.  Grossly normal wall motion.  • Overall findings are suggestive of ischemia in the territory of the left anterior descending coronary artery.    Total anti-anginal medications: none.     Patient is  presenting today with no CHF.    Pre-sedation assessment  ASA 3  Mallampati class: II - soft palate, uvula, fauces visible.

## 2023-07-06 NOTE — POST-PROCEDURE NOTE
Monitor and chaka dc'd in preparation for discharge     Discharge instructions reviewed with patient & spouse,  Medication teaching done, denies further questions     R radial cath site care education completed, patient verbalized  understanding     R radial cath site D&I  no bleeding or hematoma noted, 2x2 gauze/tegaderm dressing D&I, arm board in place   Patient discharged to home

## 2023-07-06 NOTE — CONSULTS
Cardiac Surgery Consult Note    Subjective     Chidi Ramsey is a 62 y.o. male who was admitted for Shortness of breath [R06.02]  Abnormal nuclear stress test [R94.39]. Patient was referred by Dr. West Bai for Robotic revascularization of CCAD of the Ostial LAD.   Patient is 62 y.o. male with a Pmhx significant for elevated triglycerides who presented today to undergo an elective heart catheterization due to abnormal stress test findings. Prior to today patient had been experiencing atypical chest pain and shortness of breath prompting further evaluation by his Cardiologist Dr. Pham. On June 13 he underwent a Nuclear stress test which showed  moderate size anterior septal defect with mild reversibility as well in the apical septum with an EF of 55%. Of note he also had a 6 day trial with a ZIO patch for recent episode of palpitations which was notable for a 12 beat run of V-tach. Today he underwent a LHC which demonstrated 100% ostial LAD chronic total occlusion with left to left and right to left collaterals and mild nonobstructive disease in the remaining coronary tree. CTS was subsequently consulted evaluation of Robotic revascularization.     Patient seen and evaluated in Cath lab holding accompanied by his wife and Daughter. I discussed with him Cath findings and reason for consult. He reports that prior to Catheterization he has not been feeling the best but has remained relatively active by intermittently playing golf. He denies any current chest pain, shortness of breath, palpitations, or dizziness.       Pertinent radiology results reviewed. Pertinent lab results reviewed.    Medical History:   Past Medical History:   Diagnosis Date   • Mixed hyperlipidemia 6/13/2023   • Palpitations 06/12/2023   • SOB (shortness of breath)        Surgical History:   Past Surgical History:   Procedure Laterality Date   • APPENDECTOMY      2019       Allergies: Augmentin [amoxicillin-pot clavulanate]    No  "current facility-administered medications for this encounter.        Social History:   Social History     Socioeconomic History   • Marital status:    Tobacco Use   • Smoking status: Never   • Smokeless tobacco: Never   Vaping Use   • Vaping Use: Never used   Substance and Sexual Activity   • Alcohol use: Yes     Comment: socially   • Drug use: Never   • Sexual activity: Defer       Family History:   Family History   Problem Relation Age of Onset   • Multiple sclerosis Biological Mother    • Hyperlipidemia Biological Father    • Hypertension Biological Father    • Diabetes Biological Father        Review of Systems  Constitutional: negative for fevers, fatigue and chills  Eyes: negative for blurred vision and visual disturbance  Ears, nose, mouth, throat, and face: negative for hoarseness and voice change  Respiratory: negative for shortness of breath, dyspnea on exertion and chest pain/tightness  Cardiovascular: positive for chest pressure/discomfort and palpitations  Gastrointestinal: negative for abdominal pain, nausea and vomiting  Genitourinary:negative for painful urination and urinary incontinence  Hematologic/lymphatic: negative for lymphadenopathy and swelling  Musculoskeletal:negative for back pain, neck pain and muscle weakness  Neurological: negative for confusion, vertigo, gait problems, headaches, numbness and weakness  Behavioral/Psych: negative  Allergic/Immunologic: negative    Vital signs in last 24 hours:  Heart Rate:  [64-71] 70  Resp:  [12-19] 19  BP: (120-128)/(69-85) 122/73    Objective     Physical Exam  Visit Vitals  BP (!) 140/91   Pulse 70   Resp 19   Ht 1.778 m (5' 10\")   Wt 85.7 kg (189 lb)   SpO2 96%   BMI 27.12 kg/m²       General Appearance:    Alert, cooperative, no distress, appears stated age   Head:    Normocephalic, without obvious abnormality, atraumatic   Eyes:    PERRL, conjunctiva/corneas clear, EOM's intact, fundi     benign, both eyes        Ears:    Normal TM's and " external ear canals, both ears   Nose:   Nares normal, septum midline, mucosa normal, no drainage    or sinus   tenderness   Throat:   Lips, mucosa, and tongue normal; teeth and gums normal   Neck:   Supple, symmetrical, trachea midline, no adenopathy;        thyroid:  No enlargement/tenderness/nodules; no carotid    bruit or JVD   Back:     Symmetric, no curvature, ROM normal, no CVA tenderness   Lungs:     Clear to auscultation bilaterally, respirations unlabored   Chest wall:    No tenderness or deformity   Heart:    Regular rate and rhythm, S1 and S2 normal, no murmur, rub   or gallop   Abdomen:     Soft, non-tender, bowel sounds active all four quadrants,     no masses, no organomegaly   Genitalia:    Normal male without lesion, discharge or tenderness   Rectal:    Normal tone, normal prostate, no masses or tenderness;    guaiac negative stool   Extremities:  Musculoskeletal:   Extremities normal, atraumatic, no cyanosis or edema    No injury or deformity   Pulses:   2+ and symmetric all extremities   Skin:   Skin color, texture, turgor normal, no rashes or lesions   Lymph nodes:   Cervical, supraclavicular, and axillary nodes normal   Neurologic:    Behavior/  Emotional:   CNII-XII intact. Normal strength, sensation and reflexes       throughout    Appropriate, cooperative       Labs  I have reviewed the patient's pertinent labs. Pertinent labs are within normal limits.    Imaging  I have independently reviewed the pertinent imaging from the last 24 hrs.    ECG/Telemetry  I have independently reviewed the telemetry. No events for the last 24 hours.    Assessment   62 y.o. male being consulted for Surgical revascularization evaluation for CAD of ostial LAD.     Plan   Patient seen and evaluated at bedside accompanied by wife and daughter.  -[facility] today notable for 100% Ostial LAD .  -Preoperative workup ordered for Robotic CABG.   Carotids, ECHO, CXR   CBC, CMP, TSH/T4, A1C, MRSA, UA  Patient seen and  evaluated at bedside by Dr. Jimenez and Dr. Kaba.   Surgical/Blood consent obtained and scanned into Media.  -Surgical timing TBD. Office will call patient with Definitive surgical date.

## 2023-07-06 NOTE — DISCHARGE INSTRUCTIONS
Post cardiac catheterization instructions:  No driving, operating heavy machinery, making critical decisions or activities that require balance for 24 hours.  This is because of sedation you received for your procedure.  On day of discharge, limit activities.  No heavy lifting greater than 10 lbs for the next 2 days after procedure.    Remove dressing next day. Keep site clean and dry.  May shower next day of procedure. Do not submerge catherization site in pool or tub baths for 5 days  Call if  swelling, bleeding, fever or drainage from catheterization site        Medications: Take medications as directed.  Call your physician in any questions or concerns    New medication: Aspirin 81 mg daily                               Crestor 20 mg daily        Follow up with:   Dr. Kaba   [phone]                               Dr. Pham  [phone]

## 2023-07-06 NOTE — POST-PROCEDURE NOTE
Cardiovascular Post Procedure Note:    Chidi Ramsey  7/6/2023    Pre-op Diagnosis     * Shortness of breath [R06.02]     * Abnormal nuclear stress test [R94.39]   Post-op Diagnosis     * Shortness of breath [R06.02]     * Abnormal nuclear stress test [R94.39]     Procedure(s):  Left heart cath with coronary angiography   Surgeon(s):  West Bai MD Barry, David C, MD Kutner, Thomas, MD    Findings:  Ostial  of the LAD which fills via left to left and right to left collaterals.  Mild nonobstructive disease in the remaining coronary tree.     Anesthesia:  Moderate Sedation    Staff:   Circulator: Melody Johnson RN  Documenter: Aleja Cruz RN     Estimated Blood Loss:   5 mL     Specimens:   No specimens collected during this procedure.     Implants:   Nothing was implanted during the procedure     Complications:  None    Date: 7/6/2023 Time: 8:18 AM

## 2023-07-06 NOTE — Clinical Note
The right groin and right radial was clipped and prepped with ChloraPrep. The patient was draped in a sterile fashion after allowing for the recommended dry time.

## 2023-07-07 RX ORDER — MUPIROCIN 20 MG/G
1 OINTMENT TOPICAL 2 TIMES DAILY
Qty: 22 G | Refills: 1 | Status: SHIPPED | OUTPATIENT
Start: 2023-07-07 | End: 2023-07-10

## 2023-07-10 ENCOUNTER — TELEPHONE (OUTPATIENT)
Dept: CARDIOTHORACIC SURGERY | Facility: CLINIC | Age: 63
End: 2023-07-10
Payer: COMMERCIAL

## 2023-07-10 NOTE — TELEPHONE ENCOUNTER
Pt went to Urgent care today   They RX'd   Prednisone   40 mg x 5 days  20 mg x 5 days  10 mg x 5 days    Triamcinolone 0.1%    Will see patient in office on Friday 07/14 - to determine surgical plan for Tuesday 07/18 or if poison ivy progressed and surgery will be delayed    Yvan Kaba DO

## 2023-07-10 NOTE — TELEPHONE ENCOUNTER
Patient called this morning, reporting that he poison ivy on his left inner arm, radial to antecubital area,  And worsening on other parts of his body. He has previously been treated with prednisone to contain spread.    Surgery date 7/18  Robotic CABG - [facility]     Advised patient to seek attention at PCP, but doesn't have recent PCP, patient agreeable to be seen in Urgent Care.     Will discuss with Sesar - once plan is determined by primary care team.     Deb Farmer RN

## 2023-07-11 LAB
ATRIAL RATE: 65
P AXIS: 48
PR INTERVAL: 146
QRS DURATION: 88
QT INTERVAL: 392
QTC CALCULATION(BAZETT): 407
R AXIS: 8
T WAVE AXIS: 80
VENTRICULAR RATE: 65

## 2023-07-11 PROCEDURE — 93010 ELECTROCARDIOGRAM REPORT: CPT | Performed by: INTERNAL MEDICINE

## 2023-07-12 ENCOUNTER — PREP FOR CASE (OUTPATIENT)
Dept: CARDIOTHORACIC SURGERY | Facility: CLINIC | Age: 63
End: 2023-07-12
Payer: COMMERCIAL

## 2023-07-12 RX ORDER — SODIUM CHLORIDE 9 MG/ML
INJECTION, SOLUTION INTRAVENOUS CONTINUOUS
Status: CANCELLED | OUTPATIENT
Start: 2023-07-18 | End: 2023-07-19

## 2023-07-12 RX ORDER — CHLORHEXIDINE GLUCONATE ORAL RINSE 1.2 MG/ML
15 SOLUTION DENTAL ONCE
Status: CANCELLED | OUTPATIENT
Start: 2023-07-12 | End: 2023-07-12

## 2023-07-13 NOTE — TELEPHONE ENCOUNTER
Please add to [facility] schedule Friday 07/14 @ 930AM   Patient aware,   Clinical support - nothing is needed,      Preop check of reported poison ivy  - OR date Tuesday 07/18/2023    Deb Farmer RN

## 2023-07-14 ENCOUNTER — OFFICE VISIT (OUTPATIENT)
Dept: CARDIOTHORACIC SURGERY | Facility: CLINIC | Age: 63
End: 2023-07-14
Payer: COMMERCIAL

## 2023-07-14 VITALS
RESPIRATION RATE: 16 BRPM | TEMPERATURE: 97.3 F | HEART RATE: 83 BPM | DIASTOLIC BLOOD PRESSURE: 64 MMHG | OXYGEN SATURATION: 97 % | WEIGHT: 192.5 LBS | HEIGHT: 70 IN | BODY MASS INDEX: 27.56 KG/M2 | SYSTOLIC BLOOD PRESSURE: 110 MMHG

## 2023-07-14 DIAGNOSIS — R06.02 SHORTNESS OF BREATH: ICD-10-CM

## 2023-07-14 DIAGNOSIS — I25.118 CORONARY ARTERY DISEASE INVOLVING NATIVE CORONARY ARTERY OF NATIVE HEART WITH OTHER FORM OF ANGINA PECTORIS: Primary | ICD-10-CM

## 2023-07-14 PROCEDURE — 99211 OFF/OP EST MAY X REQ PHY/QHP: CPT | Performed by: THORACIC SURGERY (CARDIOTHORACIC VASCULAR SURGERY)

## 2023-07-14 PROCEDURE — 3074F SYST BP LT 130 MM HG: CPT | Performed by: THORACIC SURGERY (CARDIOTHORACIC VASCULAR SURGERY)

## 2023-07-14 PROCEDURE — 3008F BODY MASS INDEX DOCD: CPT | Performed by: THORACIC SURGERY (CARDIOTHORACIC VASCULAR SURGERY)

## 2023-07-14 PROCEDURE — 3078F DIAST BP <80 MM HG: CPT | Performed by: THORACIC SURGERY (CARDIOTHORACIC VASCULAR SURGERY)

## 2023-07-14 RX ORDER — PREDNISONE 10 MG/1
40 TABLET ORAL DAILY
COMMUNITY
End: 2023-07-17 | Stop reason: ENTERED-IN-ERROR

## 2023-07-14 RX ORDER — TRIAMCINOLONE ACETONIDE 1 MG/G
CREAM TOPICAL 2 TIMES DAILY
COMMUNITY
End: 2023-07-17 | Stop reason: ENTERED-IN-ERROR

## 2023-07-14 NOTE — PROGRESS NOTES
"Patient scheduled for OR on 7/18 robotic CABG x1 (ELENA to LAD) with Dr Kaba. On Sunday 7/9, patient developed a poison ivy rash on his L arm. He went to urgent care on Monday 7/10 and was started on a prednisone taper (40mg x 5 days, 30mg x 5 days, etc). He was also started on triamcinolone 0.1% cream. The rash is minimal and has decreased in size and is drying up.     Patient advised to stop prednisone dose completely and take no more dose leading up to surgery. Will proceed with surgery as scheduled on 7/18.    Preoperative work up is complete and consent has been signed and is uploaded in chart.    Visit Vitals  /64 (BP Location: Left upper arm, Patient Position: Sitting)   Pulse 83   Temp 36.3 °C (97.3 °F) (Temporal)   Resp 16   Ht 1.778 m (5' 10\")   Wt 87.3 kg (192 lb 8 oz)   SpO2 97%   BMI 27.62 kg/m²     Heart: RRR  Lungs: clear throughout bilateral lung fields  Extremities: no edema    I, Bhakti OSPINA, am scribing for and in the presence of Dr Yvan Kaba.      "

## 2023-07-16 PROBLEM — I25.10 CAD IN NATIVE ARTERY: Status: ACTIVE | Noted: 2023-07-06

## 2023-07-17 ENCOUNTER — APPOINTMENT (OUTPATIENT)
Dept: PREADMISSION TESTING | Facility: HOSPITAL | Age: 63
End: 2023-07-17
Payer: COMMERCIAL

## 2023-07-17 ENCOUNTER — ANESTHESIA EVENT (INPATIENT)
Dept: OPERATING ROOM | Facility: HOSPITAL | Age: 63
DRG: 236 | End: 2023-07-17
Payer: COMMERCIAL

## 2023-07-17 VITALS — HEIGHT: 71 IN | BODY MASS INDEX: 26.32 KG/M2 | WEIGHT: 188 LBS

## 2023-07-17 ASSESSMENT — PAIN SCALES - GENERAL: PAINLEVEL: 0-NO PAIN

## 2023-07-17 ASSESSMENT — ENCOUNTER SYMPTOMS: SHORTNESS OF BREATH: 1

## 2023-07-17 NOTE — PRE-PROCEDURE INSTRUCTIONS
1. We will call you between 3 pm and 7 pm on July 17, 2023 to determine that arrival time for your procedure. If you do not hear by 6PM. Please call [phone] for arrival time.     2. Please report to Main Entrance near Parking lot A, walk into main lobby and report to the admission desk on the first floor on the day of your procedure.    3. Please follow the following fasting guidelines:     Nothing to eat or drink after midnight per surgeon      4. Please take ONLY the following medications with a sip of water on the morning of your procedure:   Follow all instructions re: medications per surgeon    5. Other Instructions: You may brush your teeth the morning of the procedure. Rinse and spit, do not swallow.   Use surgical wash as       directed.     6. If you develop a cold, cough, fever, rash, or any other symptom prior to the day of the procedure, please report it to your physician immediately.    7. If you need to cancel the procedure for any reason, please contact your physician.    8. Make arrangements to have safe transportation home accompanied by a responsible adult. If you have not arranged safe transportation home, your surgery will be cancelled. Safe transportation may include private vehicle, ride-share service, taxi and public transportation when accompanied by a responsible adult who will assist you home. A responsible adult is someone known to you and does not include the taxi, ride-share or public transit drive transporting you.    9. You may not take public transportation unless accompanied by a responsible person.    10. You may not drive a car or operate complex or potentially dangerous machinery for 24 hours following anesthesia and/or sedation.    11.  If it is medically necessary for you to have a longer stay, you will be informed as soon as the decision is made.    12. Only bring essential items to the hospital.  Do not wear or bring anything of value to the hospital including jewelry of  any kind, money, or wallet. Do not wear make-up or contact lenses.  DO NOT BRING MEDICATIONS FROM HOME unless instructed to do so. DO bring your hearing aids, glasses, and a case    13. No lotion, creams, powders, or oils on skin the morning of procedure     14. Dress in comfortable clothes.    15.  If instructed, please bring a copy of your Advanced Directive (Living Will/Durable Power of ) on the day of your procedure.     16. Ensuring your safety at all times is a very important part of our [facility] Culture of Safety. After having surgery and sedation, you are at risk for falling and balance issues. Although you may feel awake, the effects of the medication can last up to 24 hours after anesthesia. If you need to use the bathroom during your recovery period, nursing staff will escort you there and stay with you to ensure your safety.    17. Refrain from drinking alcohol and smoking cigarettes/ marijuana for 24 hours prior to surgery.    18. Shower with antibacterial soap (Dial) the night before and morning of your procedure.  If your procedure indicates the need for CHG antiseptic wash (Bactoshield or Hibiclens), please use this instead and follow instructions as discussed at the time of your Pre-Admission Testing phone interview or visit.    Above instructions reviewed with patient and patient acknowledges understanding.

## 2023-07-17 NOTE — PRE-PROCEDURE NOTE
Pre Cardiac Surgery Note    - Patient was seen and examined at bedside.  - The patient's chart and all data was reviewed.  - The procedure, treatment alternatives, risks and benefits were explained with specific risks discussed.  - The STS Risk Calculator score was discussed with the patient family prior to surgery, where applicable.  - After discussion with shared decision making, an agreement has been reached to proceed with: OP CAB MID/THORACOTOMY POSSIBLE ELENA, ALLY ROBOTIC (L)    Status of surgery: elective      Preoperative Cardiac Status  Prior myocardiac infarction        MI when: >21 days  Primary coronary symptom for surgery: stable angina    No heart failure      BNP (pg/mL)     Date/Time                Value               07/06/2023 1046          29             ----------          No cardiogenic shock (w/in 24 hrs)  No cardiac arrest (w/in 24 hrs)    History  No atrial fibrillation    Preoperative medications - CABG specific  Beta blocker within 24 hours of incision: yes    Patient reported taking METOPROLOL TARTRATE 25 MG TABLET on 7/18/2023 at 07:40 EDT      Inpatient Beta Blocker Administrations (last 24 hours)     None          Imaging Impressions for Echo and Carotid US:    Ultrasound carotid bilateral 07/06/2023    Narrative  CLINICAL HISTORY: I25.10: Atherosclerotic heart disease of native coronary  artery without angina pectoris    COMMENT:    Comparison: None available    Examination: Duplex ultrasound of the carotid arteries.    Technique: Grayscale ultrasound, color Doppler, and spectral Doppler.    RIGHT: Grayscale ultrasound reveals minimal atherosclerotic plaque.    There is no significant narrowing by color Doppler. There are no significantly  elevated velocities in the internal carotid artery.  Internal carotid artery  maximum peak systolic velocity is 87  cm/s and maximum end diastolic velocity is  36 cm/s.  Common carotid artery peak systolic velocity is 131 cm/s.  ICA:CCA ratio:  1  Antegrade flow within the vertebral artery.    LEFT: Grayscale ultrasound reveals minimal atherosclerotic plaque.    There is no significant narrowing by color Doppler. There are no significantly  elevated velocities in the internal carotid artery. Internal carotid artery  maximum peak systolic velocity is 108  cm/s and maximum end diastolic velocity  is 38 cm/s.  Common carotid artery peak systolic velocity is 111 cm/s.  ICA:CCA ratio: 1  Antegrade flow within the vertebral artery.    --    Impression  Minimal plaque within the internal carotid arteries, causing less than 50%  stenosis.      CAROTID DUPLEX CRITERIA:  Measurement of carotid stenosis is based on velocity  parameters that correlate the residual internal carotid diameter with [place]  American Symptomatic Carotid Endarterectomy Trial (NASCET)-based stenosis  levels.    Transthoracic echo (TTE) complete with contrast 07/06/2023    Interpretation Summary  The left ventricular cavity size is normal with normal wall thickness and preserved systolic function. EF 55% by visual estimate. Difficult to assess regional wall motions despite use of Definity-Grossly normal wall motion. Normal diastolic function for age.    Aortic valve cusps poorly visualized. The visualized portions of the aortic root and ascending aorta are normal in size.  No aortic coarctation.  No aortic insufficiency or stenosis.    The mitral valve is normal. Trace mitral regurgitation.    The left atrium is normal in size.    Limited visualization but the right ventricle appears normal size with preserved systolic function.    The pulmonic valve is not well seen.    The tricuspid is normal in appearance. Trace tricuspid regurgitation with insufficient jet to estimate RVSP.    Right atrium is not well seen.    The IVC is small and collapses > 50% with inspiration consistent with normal right atrial pressures.    No pericardial effusion.    No prior study for comparison.

## 2023-07-17 NOTE — ANESTHESIA PREPROCEDURE EVALUATION
Relevant Problems   CARDIOVASCULAR   (+) Coronary artery disease      RESPIRATORY SYSTEM   (+) Shortness of breath       Anesthesia ROS/MED HX      Pulmonary    Shortness of breath  Cardiovascular   TORO   CAD   Angina   dyslipidemia   hypertension   Echocardiogram reviewed and ECG reviewed       Past Surgical History:   Procedure Laterality Date   • APPENDECTOMY      2019     Patient Active Problem List   Diagnosis   • Palpitations   • Shortness of breath   • Mixed hyperlipidemia   • Elevated blood pressure reading in office with diagnosis of hypertension   • Abnormal nuclear stress test   • Coronary artery disease       No current facility-administered medications for this encounter.       Prior to Admission medications    Medication Sig Start Date End Date Taking? Authorizing Provider   acetylcysteine (NAC ORAL) Take 1,000 mg by mouth daily.    ProviderKleber MD   ascorbic acid/multivit-min (EMERGEN-C ORAL) Take 1 packet by mouth daily.    ProviderKleber MD   aspirin 81 mg enteric coated tablet Take 1 tablet (81 mg total) by mouth daily. 7/6/23 11/3/23  Reina Lion CRNP   cholecalciferol, vitamin D3, 1,000 unit (25 mcg) tablet Take 1,000 Units by mouth daily.    ProviderKleber MD   CRESTOR 20 mg tablet Take 1 tablet (20 mg total) by mouth daily. 7/6/23 11/3/23  Reina Lion CRNP   ibuprofen (MOTRIN) 200 mg tablet Take 200 mg by mouth 2 (two) times a day as needed for mild pain.    ProviderKleber MD   mv-min/vit C/glut/lysine/hb124 (IMMUNE SUPPORT ORAL) Take 1 tablet by mouth daily.    ProviderKleber MD   predniSONE (DELTASONE) 10 mg tablet Take 40 mg by mouth daily. Taken Mon through Thurs 7/13. No dose 7/14 for poison ivy rash.    ProviderKleber MD   triamcinolone (KENALOG) 0.1 % cream Apply topically 2 (two) times a day. L arm for poison ivy infection    ProviderKleber MD   vitamin B complex (B COMPLEX ORAL) Take 1 tablet  by mouth daily.    Provider, Kleber Arciniega MD       CBC Results       07/06/23 06/16/23     1046 0708    WBC 5.33 5.9    RBC 4.50 4.70    HGB 14.3 14.8    HCT 40.4 43.1    MCV 89.8 92    MCH 31.8 31.5    MCHC 35.4 34.3     248          BMP Results       07/06/23 06/16/23     1046 0708     138    K 4.1 4.2    Cl 106 102    CO2 28 24    Glucose 105 99    BUN 13 13    Creatinine 0.9 0.93    Calcium 8.6 --    Anion Gap 5 --    EGFR >60.0 93                  No orders to display       Physical Exam    Airway   Mallampati: III   TM distance: >3 FB   Neck ROM: full  Cardiovascular - normal   Rhythm: regular   Rate: normalPulmonary - normal   clear to auscultation  Dental - normal        Anesthesia Plan    Plan: general    Technique: general endotracheal     Lines and Monitors: arterial line, BIS, central line, CVP and PIV     Airway: bronchial blocker, oral intubation and video laryngoscope   4 ASA  Anesthetic plan and risks discussed with: patient  Induction:    intravenous   Postop Plan:   Patient Disposition: ICU planned admission   Pain Management: IV analgesics

## 2023-07-18 ENCOUNTER — ANESTHESIA (INPATIENT)
Dept: OPERATING ROOM | Facility: HOSPITAL | Age: 63
DRG: 236 | End: 2023-07-18
Payer: COMMERCIAL

## 2023-07-18 ENCOUNTER — PROCEDURE PASS (OUTPATIENT)
Age: 63
End: 2023-07-18
Payer: COMMERCIAL

## 2023-07-18 ENCOUNTER — APPOINTMENT (INPATIENT)
Dept: RADIOLOGY | Facility: HOSPITAL | Age: 63
DRG: 236 | End: 2023-07-18
Attending: PHYSICIAN ASSISTANT
Payer: COMMERCIAL

## 2023-07-18 ENCOUNTER — HOSPITAL ENCOUNTER (INPATIENT)
Facility: HOSPITAL | Age: 63
LOS: 3 days | Discharge: HOME HEALTH | DRG: 236 | End: 2023-07-21
Attending: THORACIC SURGERY (CARDIOTHORACIC VASCULAR SURGERY) | Admitting: THORACIC SURGERY (CARDIOTHORACIC VASCULAR SURGERY)
Payer: COMMERCIAL

## 2023-07-18 DIAGNOSIS — I25.118 CORONARY ARTERY DISEASE OF NATIVE ARTERY OF NATIVE HEART WITH STABLE ANGINA PECTORIS (CMS/HCC): Primary | ICD-10-CM

## 2023-07-18 LAB
ABO + RH BLD: NORMAL
ANION GAP SERPL CALC-SCNC: 8 MEQ/L (ref 3–15)
ANION GAP SERPL CALC-SCNC: 9 MEQ/L (ref 3–15)
APTT PPP: 28 SEC (ref 23–35)
ATRIAL RATE: 98
BASE EXCESS BLDA CALC-SCNC: -0.4 MEQ/L
BASE EXCESS BLDA CALC-SCNC: -0.7 MEQ/L
BASE EXCESS BLDA CALC-SCNC: -1.1 MEQ/L
BASE EXCESS BLDA CALC-SCNC: -1.1 MEQ/L
BASE EXCESS BLDA CALC-SCNC: -1.5 MEQ/L
BASE EXCESS BLDA CALC-SCNC: -1.8 MEQ/L
BASE EXCESS BLDA CALC-SCNC: -2.5 MEQ/L
BASE EXCESS BLDA CALC-SCNC: -2.9 MEQ/L
BASE EXCESS BLDA CALC-SCNC: -3 MEQ/L
BASE EXCESS BLDA CALC-SCNC: -3.3 MEQ/L
BASE EXCESS BLDA CALC-SCNC: 0 MEQ/L
BASE EXCESS BLDA CALC-SCNC: 0 MEQ/L
BUN SERPL-MCNC: 13 MG/DL (ref 7–25)
BUN SERPL-MCNC: 13 MG/DL (ref 7–25)
CA-I BLD-SCNC: 1 MMOL/L (ref 1.15–1.27)
CA-I BLD-SCNC: 1.12 MMOL/L (ref 1.15–1.27)
CA-I BLD-SCNC: 1.13 MMOL/L (ref 1.15–1.27)
CA-I BLD-SCNC: 1.13 MMOL/L (ref 1.15–1.27)
CA-I BLD-SCNC: 1.14 MMOL/L (ref 1.15–1.27)
CA-I BLD-SCNC: 1.15 MMOL/L (ref 1.15–1.27)
CA-I BLD-SCNC: 1.15 MMOL/L (ref 1.15–1.27)
CA-I BLD-SCNC: 1.18 MMOL/L (ref 1.15–1.27)
CA-I BLD-SCNC: 1.2 MMOL/L (ref 1.15–1.27)
CA-I BLD-SCNC: 1.21 MMOL/L (ref 1.15–1.27)
CA-I BLD-SCNC: 1.22 MMOL/L (ref 1.15–1.27)
CA-I BLD-SCNC: 1.22 MMOL/L (ref 1.15–1.27)
CA-I BLD-SCNC: 1.26 MMOL/L (ref 1.15–1.27)
CALCIUM SERPL-MCNC: 7.9 MG/DL (ref 8.6–10.3)
CALCIUM SERPL-MCNC: 8.1 MG/DL (ref 8.6–10.3)
CHLORIDE BLDA-SCNC: 101 MEQ/L (ref 98–109)
CHLORIDE BLDA-SCNC: 102 MEQ/L (ref 98–109)
CHLORIDE BLDA-SCNC: 102 MEQ/L (ref 98–109)
CHLORIDE BLDA-SCNC: 104 MEQ/L (ref 98–109)
CHLORIDE BLDA-SCNC: 105 MEQ/L (ref 98–109)
CHLORIDE BLDA-SCNC: 106 MEQ/L (ref 98–109)
CHLORIDE SERPL-SCNC: 105 MEQ/L (ref 98–107)
CHLORIDE SERPL-SCNC: 108 MEQ/L (ref 98–107)
CO2 BLDA-SCNC: 22 MEQ/L (ref 22–32)
CO2 BLDA-SCNC: 23 MEQ/L (ref 22–32)
CO2 BLDA-SCNC: 23 MEQ/L (ref 22–32)
CO2 BLDA-SCNC: 24 MEQ/L (ref 22–32)
CO2 BLDA-SCNC: 24 MEQ/L (ref 22–32)
CO2 BLDA-SCNC: 25 MEQ/L (ref 22–32)
CO2 BLDA-SCNC: 25 MEQ/L (ref 22–32)
CO2 BLDA-SCNC: 26 MEQ/L (ref 22–32)
CO2 BLDA-SCNC: 27 MEQ/L (ref 22–32)
CO2 SERPL-SCNC: 23 MEQ/L (ref 21–31)
CO2 SERPL-SCNC: 24 MEQ/L (ref 21–31)
CREAT SERPL-MCNC: 0.7 MG/DL (ref 0.7–1.3)
CREAT SERPL-MCNC: 0.7 MG/DL (ref 0.7–1.3)
D AG BLD QL: NEGATIVE
ERYTHROCYTE [DISTWIDTH] IN BLOOD BY AUTOMATED COUNT: 12 % (ref 11.6–14.4)
ERYTHROCYTE [DISTWIDTH] IN BLOOD BY AUTOMATED COUNT: 12 % (ref 11.6–14.4)
GFR SERPL CREATININE-BSD FRML MDRD: >60 ML/MIN/1.73M*2
GFR SERPL CREATININE-BSD FRML MDRD: >60 ML/MIN/1.73M*2
GLUCOSE BLDA-MCNC: 101 MG/DL (ref 70–99)
GLUCOSE BLDA-MCNC: 128 MG/DL (ref 70–99)
GLUCOSE BLDA-MCNC: 129 MG/DL (ref 70–99)
GLUCOSE BLDA-MCNC: 132 MG/DL (ref 70–99)
GLUCOSE BLDA-MCNC: 140 MG/DL (ref 70–99)
GLUCOSE BLDA-MCNC: 142 MG/DL (ref 70–99)
GLUCOSE BLDA-MCNC: 147 MG/DL (ref 70–99)
GLUCOSE BLDA-MCNC: 148 MG/DL (ref 70–99)
GLUCOSE BLDA-MCNC: 151 MG/DL (ref 70–99)
GLUCOSE BLDA-MCNC: 154 MG/DL (ref 70–99)
GLUCOSE BLDA-MCNC: 168 MG/DL (ref 70–99)
GLUCOSE BLDA-MCNC: 170 MG/DL (ref 70–99)
GLUCOSE SERPL-MCNC: 128 MG/DL (ref 70–99)
GLUCOSE SERPL-MCNC: 152 MG/DL (ref 70–99)
HCO3 BLDA-SCNC: 22 MEQ/L (ref 21–28)
HCO3 BLDA-SCNC: 23 MEQ/L (ref 21–28)
HCO3 BLDA-SCNC: 24 MEQ/L (ref 21–28)
HCO3 BLDA-SCNC: 25 MEQ/L (ref 21–28)
HCT VFR BLDCO AUTO: 33 % (ref 40.1–51)
HCT VFR BLDCO AUTO: 38 % (ref 40.1–51)
HCV AB SER QL: NONREACTIVE
HGB BLD-MCNC: 11.7 G/DL (ref 13.7–17.5)
HGB BLD-MCNC: 13.5 G/DL (ref 13.7–17.5)
HGB BLDA-MCNC: 11.8 G/DL (ref 14–17.5)
HGB BLDA-MCNC: 12 G/DL (ref 14–17.5)
HGB BLDA-MCNC: 12.1 G/DL (ref 14–17.5)
HGB BLDA-MCNC: 12.3 G/DL (ref 14–17.5)
HGB BLDA-MCNC: 12.3 G/DL (ref 14–17.5)
HGB BLDA-MCNC: 12.9 G/DL (ref 14–17.5)
HGB BLDA-MCNC: 13.1 G/DL (ref 14–17.5)
HGB BLDA-MCNC: 13.2 G/DL (ref 14–17.5)
HGB BLDA-MCNC: 13.4 G/DL (ref 14–17.5)
HGB BLDA-MCNC: 13.4 G/DL (ref 14–17.5)
HGB BLDA-MCNC: 13.8 G/DL (ref 14–17.5)
HGB BLDA-MCNC: 14.4 G/DL (ref 14–17.5)
INR PPP: 1.3
LABORATORY COMMENT REPORT: NORMAL
LACTATE BLDA-SCNC: 1.1 MMOL/L (ref 0.4–1.6)
LACTATE BLDA-SCNC: 1.2 MMOL/L (ref 0.4–1.6)
LACTATE BLDA-SCNC: 1.2 MMOL/L (ref 0.4–1.6)
LACTATE BLDA-SCNC: 1.3 MMOL/L (ref 0.4–1.6)
LACTATE BLDA-SCNC: 1.3 MMOL/L (ref 0.4–1.6)
LACTATE BLDA-SCNC: 1.9 MMOL/L (ref 0.4–1.6)
LACTATE BLDA-SCNC: 2.2 MMOL/L (ref 0.4–1.6)
LACTATE BLDA-SCNC: 2.8 MMOL/L (ref 0.4–1.6)
MAGNESIUM SERPL-MCNC: 2.1 MG/DL (ref 1.9–2.7)
MAGNESIUM SERPL-MCNC: 2.3 MG/DL (ref 1.9–2.7)
MAGNESIUM SERPL-MCNC: 2.5 MG/DL (ref 1.9–2.7)
MCH RBC QN AUTO: 31.2 PG (ref 28–33.2)
MCH RBC QN AUTO: 31.9 PG (ref 28–33.2)
MCHC RBC AUTO-ENTMCNC: 35.5 G/DL (ref 32.2–36.5)
MCHC RBC AUTO-ENTMCNC: 35.5 G/DL (ref 32.2–36.5)
MCV RBC AUTO: 88 FL (ref 83–98)
MCV RBC AUTO: 89.8 FL (ref 83–98)
P AXIS: 79
PCO2 BLDA: 33 MM HG (ref 35–48)
PCO2 BLDA: 36 MM HG (ref 35–48)
PCO2 BLDA: 36 MM HG (ref 35–48)
PCO2 BLDA: 38 MM HG (ref 35–48)
PCO2 BLDA: 39 MM HG (ref 35–48)
PCO2 BLDA: 40 MM HG (ref 35–48)
PCO2 BLDA: 42 MM HG (ref 35–48)
PCO2 BLDA: 43 MM HG (ref 35–48)
PCO2 BLDA: 45 MM HG (ref 35–48)
PCO2 BLDA: 47 MM HG (ref 35–48)
PDW BLD AUTO: 9.1 FL (ref 9.4–12.4)
PDW BLD AUTO: 9.2 FL (ref 9.4–12.4)
PH BLDA: 7.33 PH (ref 7.35–7.45)
PH BLDA: 7.34 PH (ref 7.35–7.45)
PH BLDA: 7.35 PH (ref 7.35–7.45)
PH BLDA: 7.36 PH (ref 7.35–7.45)
PH BLDA: 7.37 PH (ref 7.35–7.45)
PH BLDA: 7.37 PH (ref 7.35–7.45)
PH BLDA: 7.38 PH (ref 7.35–7.45)
PH BLDA: 7.4 PH (ref 7.35–7.45)
PH BLDA: 7.42 PH (ref 7.35–7.45)
PH BLDA: 7.44 PH (ref 7.35–7.45)
PHOSPHATE SERPL-MCNC: 1.5 MG/DL (ref 2.4–4.7)
PHOSPHATE SERPL-MCNC: 2.2 MG/DL (ref 2.4–4.7)
PLATELET # BLD AUTO: 190 K/UL (ref 150–350)
PLATELET # BLD AUTO: 218 K/UL (ref 150–350)
PO2 BLDA: 101 MM HG (ref 83–100)
PO2 BLDA: 102 MM HG (ref 83–100)
PO2 BLDA: 105 MM HG (ref 83–100)
PO2 BLDA: 107 MM HG (ref 83–100)
PO2 BLDA: 107 MM HG (ref 83–100)
PO2 BLDA: 120 MM HG (ref 83–100)
PO2 BLDA: 125 MM HG (ref 83–100)
PO2 BLDA: 135 MM HG (ref 83–100)
PO2 BLDA: 145 MM HG (ref 83–100)
PO2 BLDA: 147 MM HG (ref 83–100)
PO2 BLDA: 303 MM HG (ref 83–100)
PO2 BLDA: 98 MM HG (ref 83–100)
POCT ACT-HR: 307 SEC (ref 82–134)
POCT ACT-HR: 90 SEC (ref 82–134)
POCT ACT-HR: 90 SEC (ref 82–134)
POCT PATIENT TEMPERATURE: 98.6 °F (ref 97–99)
POCT TEST (BLD GAS): ABNORMAL
POCT TEST: ABNORMAL
POCT TEST: NORMAL
POCT TEST: NORMAL
POTASSIUM BLDA-SCNC: 3.2 MEQ/L (ref 3.4–4.5)
POTASSIUM BLDA-SCNC: 3.4 MEQ/L (ref 3.4–4.5)
POTASSIUM BLDA-SCNC: 3.4 MEQ/L (ref 3.4–4.5)
POTASSIUM BLDA-SCNC: 3.5 MEQ/L (ref 3.4–4.5)
POTASSIUM BLDA-SCNC: 3.6 MEQ/L (ref 3.4–4.5)
POTASSIUM BLDA-SCNC: 3.7 MEQ/L (ref 3.4–4.5)
POTASSIUM BLDA-SCNC: 3.8 MEQ/L (ref 3.4–4.5)
POTASSIUM BLDA-SCNC: 3.9 MEQ/L (ref 3.4–4.5)
POTASSIUM BLDA-SCNC: 4 MEQ/L (ref 3.4–4.5)
POTASSIUM BLDA-SCNC: 4.3 MEQ/L (ref 3.4–4.5)
POTASSIUM BLDA-SCNC: 4.5 MEQ/L (ref 3.4–4.5)
POTASSIUM BLDA-SCNC: 4.5 MEQ/L (ref 3.4–4.5)
POTASSIUM SERPL-SCNC: 3.5 MEQ/L (ref 3.5–5.1)
POTASSIUM SERPL-SCNC: 4.1 MEQ/L (ref 3.5–5.1)
POTASSIUM SERPL-SCNC: 4.1 MEQ/L (ref 3.5–5.1)
POTASSIUM SERPL-SCNC: 4.3 MEQ/L (ref 3.5–5.1)
PR INTERVAL: 160
PROTHROMBIN TIME: 16 SEC (ref 12.2–14.5)
QRS DURATION: 94
QT INTERVAL: 376
QTC CALCULATION(BAZETT): 480
R AXIS: 55
RBC # BLD AUTO: 3.75 M/UL (ref 4.5–5.8)
RBC # BLD AUTO: 4.23 M/UL (ref 4.5–5.8)
SAO2 % BLDA: 96 % (ref 93–98)
SAO2 % BLDA: 97 % (ref 93–98)
SAO2 % BLDA: 98 % (ref 93–98)
SAO2 % BLDA: 98 % (ref 93–98)
SODIUM BLDA-SCNC: 131 MEQ/L (ref 136–145)
SODIUM BLDA-SCNC: 132 MEQ/L (ref 136–145)
SODIUM BLDA-SCNC: 133 MEQ/L (ref 136–145)
SODIUM BLDA-SCNC: 134 MEQ/L (ref 136–145)
SODIUM BLDA-SCNC: 136 MEQ/L (ref 136–145)
SODIUM BLDA-SCNC: 137 MEQ/L (ref 136–145)
SODIUM BLDA-SCNC: 138 MEQ/L (ref 136–145)
SODIUM BLDA-SCNC: 138 MEQ/L (ref 136–145)
SODIUM SERPL-SCNC: 136 MEQ/L (ref 136–145)
SODIUM SERPL-SCNC: 141 MEQ/L (ref 136–145)
T WAVE AXIS: 91
VENTRICULAR RATE: 98
WBC # BLD AUTO: 16 K/UL (ref 3.8–10.5)
WBC # BLD AUTO: 16.51 K/UL (ref 3.8–10.5)

## 2023-07-18 PROCEDURE — 85347 COAGULATION TIME ACTIVATED: CPT | Performed by: THORACIC SURGERY (CARDIOTHORACIC VASCULAR SURGERY)

## 2023-07-18 PROCEDURE — 36415 COLL VENOUS BLD VENIPUNCTURE: CPT | Performed by: THORACIC SURGERY (CARDIOTHORACIC VASCULAR SURGERY)

## 2023-07-18 PROCEDURE — 85610 PROTHROMBIN TIME: CPT | Performed by: PHYSICIAN ASSISTANT

## 2023-07-18 PROCEDURE — 84132 ASSAY OF SERUM POTASSIUM: CPT | Performed by: PHYSICIAN ASSISTANT

## 2023-07-18 PROCEDURE — 25800000 HC PHARMACY IV SOLUTIONS: Performed by: PHYSICIAN ASSISTANT

## 2023-07-18 PROCEDURE — 82330 ASSAY OF CALCIUM: CPT | Performed by: PHYSICIAN ASSISTANT

## 2023-07-18 PROCEDURE — 99291 CRITICAL CARE FIRST HOUR: CPT | Performed by: ANESTHESIOLOGY

## 2023-07-18 PROCEDURE — 8E0W0CZ ROBOTIC ASSISTED PROCEDURE OF TRUNK REGION, OPEN APPROACH: ICD-10-PCS | Performed by: THORACIC SURGERY (CARDIOTHORACIC VASCULAR SURGERY)

## 2023-07-18 PROCEDURE — 33533 CABG ARTERIAL SINGLE: CPT | Performed by: THORACIC SURGERY (CARDIOTHORACIC VASCULAR SURGERY)

## 2023-07-18 PROCEDURE — 27200000 HC STERILE SUPPLY: Performed by: THORACIC SURGERY (CARDIOTHORACIC VASCULAR SURGERY)

## 2023-07-18 PROCEDURE — 63600000 HC DRUGS/DETAIL CODE: Mod: JZ | Performed by: PHYSICIAN ASSISTANT

## 2023-07-18 PROCEDURE — 86891 AUTOLOGOUS BLOOD OP SALVAGE: CPT | Performed by: THORACIC SURGERY (CARDIOTHORACIC VASCULAR SURGERY)

## 2023-07-18 PROCEDURE — 36000006 HC OR LEVEL 6 INITIAL 30MIN: Performed by: THORACIC SURGERY (CARDIOTHORACIC VASCULAR SURGERY)

## 2023-07-18 PROCEDURE — 02100Z9 BYPASS CORONARY ARTERY, ONE ARTERY FROM LEFT INTERNAL MAMMARY, OPEN APPROACH: ICD-10-PCS | Performed by: THORACIC SURGERY (CARDIOTHORACIC VASCULAR SURGERY)

## 2023-07-18 PROCEDURE — G0472 HEP C SCREEN HIGH RISK/OTHER: HCPCS | Performed by: PHYSICIAN ASSISTANT

## 2023-07-18 PROCEDURE — 93010 ELECTROCARDIOGRAM REPORT: CPT | Performed by: INTERNAL MEDICINE

## 2023-07-18 PROCEDURE — 71045 X-RAY EXAM CHEST 1 VIEW: CPT

## 2023-07-18 PROCEDURE — P9045 ALBUMIN (HUMAN), 5%, 250 ML: HCPCS | Mod: JZ,JG | Performed by: ANESTHESIOLOGY

## 2023-07-18 PROCEDURE — 63600000 HC DRUGS/DETAIL CODE: Mod: JZ | Performed by: THORACIC SURGERY (CARDIOTHORACIC VASCULAR SURGERY)

## 2023-07-18 PROCEDURE — 84100 ASSAY OF PHOSPHORUS: CPT | Performed by: PHYSICIAN ASSISTANT

## 2023-07-18 PROCEDURE — 25800000 HC PHARMACY IV SOLUTIONS: Performed by: THORACIC SURGERY (CARDIOTHORACIC VASCULAR SURGERY)

## 2023-07-18 PROCEDURE — 37000001 HC ANESTHESIA GENERAL: Performed by: THORACIC SURGERY (CARDIOTHORACIC VASCULAR SURGERY)

## 2023-07-18 PROCEDURE — 63600000 HC DRUGS/DETAIL CODE: Performed by: ANESTHESIOLOGY

## 2023-07-18 PROCEDURE — 63700000 HC SELF-ADMINISTRABLE DRUG: Performed by: THORACIC SURGERY (CARDIOTHORACIC VASCULAR SURGERY)

## 2023-07-18 PROCEDURE — 20000000 HC ROOM AND CARE ICU

## 2023-07-18 PROCEDURE — S2900 ROBOTIC SURGICAL SYSTEM: HCPCS | Performed by: THORACIC SURGERY (CARDIOTHORACIC VASCULAR SURGERY)

## 2023-07-18 PROCEDURE — P9045 ALBUMIN (HUMAN), 5%, 250 ML: HCPCS | Mod: JZ,JG | Performed by: PHYSICIAN ASSISTANT

## 2023-07-18 PROCEDURE — 25000000 HC PHARMACY GENERAL: Performed by: ANESTHESIOLOGY

## 2023-07-18 PROCEDURE — 93005 ELECTROCARDIOGRAM TRACING: CPT | Performed by: PHYSICIAN ASSISTANT

## 2023-07-18 PROCEDURE — C1729 CATH, DRAINAGE: HCPCS | Performed by: THORACIC SURGERY (CARDIOTHORACIC VASCULAR SURGERY)

## 2023-07-18 PROCEDURE — 25800000 HC PHARMACY IV SOLUTIONS: Performed by: ANESTHESIOLOGY

## 2023-07-18 PROCEDURE — 25000000 HC PHARMACY GENERAL: Performed by: THORACIC SURGERY (CARDIOTHORACIC VASCULAR SURGERY)

## 2023-07-18 PROCEDURE — 25000000 HC PHARMACY GENERAL: Performed by: PHYSICIAN ASSISTANT

## 2023-07-18 PROCEDURE — 33533 CABG ARTERIAL SINGLE: CPT | Mod: 82AS | Performed by: PHYSICIAN ASSISTANT

## 2023-07-18 PROCEDURE — 63700000 HC SELF-ADMINISTRABLE DRUG: Performed by: PHYSICIAN ASSISTANT

## 2023-07-18 PROCEDURE — 80048 BASIC METABOLIC PNL TOTAL CA: CPT | Performed by: PHYSICIAN ASSISTANT

## 2023-07-18 PROCEDURE — 85027 COMPLETE CBC AUTOMATED: CPT | Performed by: PHYSICIAN ASSISTANT

## 2023-07-18 PROCEDURE — 36000016 HC OR LEVEL 6 EA ADDL MIN: Performed by: THORACIC SURGERY (CARDIOTHORACIC VASCULAR SURGERY)

## 2023-07-18 PROCEDURE — 82803 BLOOD GASES ANY COMBINATION: CPT | Performed by: THORACIC SURGERY (CARDIOTHORACIC VASCULAR SURGERY)

## 2023-07-18 PROCEDURE — 63600000 HC DRUGS/DETAIL CODE: Performed by: THORACIC SURGERY (CARDIOTHORACIC VASCULAR SURGERY)

## 2023-07-18 PROCEDURE — 83735 ASSAY OF MAGNESIUM: CPT | Performed by: THORACIC SURGERY (CARDIOTHORACIC VASCULAR SURGERY)

## 2023-07-18 PROCEDURE — 83735 ASSAY OF MAGNESIUM: CPT | Performed by: PHYSICIAN ASSISTANT

## 2023-07-18 PROCEDURE — A4648 IMPLANTABLE TISSUE MARKER: HCPCS | Performed by: THORACIC SURGERY (CARDIOTHORACIC VASCULAR SURGERY)

## 2023-07-18 PROCEDURE — 85730 THROMBOPLASTIN TIME PARTIAL: CPT | Performed by: PHYSICIAN ASSISTANT

## 2023-07-18 RX ORDER — LANOLIN ALCOHOL/MO/W.PET/CERES
400 CREAM (GRAM) TOPICAL 2 TIMES DAILY
Status: DISCONTINUED | OUTPATIENT
Start: 2023-07-18 | End: 2023-07-21 | Stop reason: HOSPADM

## 2023-07-18 RX ORDER — CALCIUM CHLORIDE INJECTION 100 MG/ML
INJECTION, SOLUTION INTRAVENOUS AS NEEDED
Status: DISCONTINUED | OUTPATIENT
Start: 2023-07-18 | End: 2023-07-18 | Stop reason: SURG

## 2023-07-18 RX ORDER — FAMOTIDINE 20 MG/1
20 TABLET, FILM COATED ORAL 2 TIMES DAILY
Status: DISCONTINUED | OUTPATIENT
Start: 2023-07-18 | End: 2023-07-21 | Stop reason: HOSPADM

## 2023-07-18 RX ORDER — METOCLOPRAMIDE HYDROCHLORIDE 5 MG/ML
INJECTION INTRAMUSCULAR; INTRAVENOUS AS NEEDED
Status: DISCONTINUED | OUTPATIENT
Start: 2023-07-18 | End: 2023-07-18 | Stop reason: SURG

## 2023-07-18 RX ORDER — BUPIVACAINE HYDROCHLORIDE 2.5 MG/ML
20 INJECTION, SOLUTION EPIDURAL; INFILTRATION; INTRACAUDAL EVERY 4 HOURS PRN
Status: DISCONTINUED | OUTPATIENT
Start: 2023-07-18 | End: 2023-07-21 | Stop reason: HOSPADM

## 2023-07-18 RX ORDER — SODIUM CHLORIDE 9 MG/ML
INJECTION, SOLUTION INTRAVENOUS CONTINUOUS
Status: DISCONTINUED | OUTPATIENT
Start: 2023-07-18 | End: 2023-07-18

## 2023-07-18 RX ORDER — SODIUM CHLORIDE 9 MG/ML
INJECTION, SOLUTION INTRAVENOUS CONTINUOUS
Status: DISCONTINUED | OUTPATIENT
Start: 2023-07-18 | End: 2023-07-19

## 2023-07-18 RX ORDER — HYDROMORPHONE HYDROCHLORIDE 1 MG/ML
INJECTION, SOLUTION INTRAMUSCULAR; INTRAVENOUS; SUBCUTANEOUS AS NEEDED
Status: DISCONTINUED | OUTPATIENT
Start: 2023-07-18 | End: 2023-07-18 | Stop reason: SURG

## 2023-07-18 RX ORDER — NOREPINEPHRINE BITARTRATE 0.02 MG/ML
INJECTION, SOLUTION INTRAVENOUS CONTINUOUS PRN
Status: DISCONTINUED | OUTPATIENT
Start: 2023-07-18 | End: 2023-07-18 | Stop reason: SURG

## 2023-07-18 RX ORDER — FAMOTIDINE 10 MG/ML
20 INJECTION INTRAVENOUS 2 TIMES DAILY
Status: DISCONTINUED | OUTPATIENT
Start: 2023-07-18 | End: 2023-07-19

## 2023-07-18 RX ORDER — ONDANSETRON HYDROCHLORIDE 2 MG/ML
INJECTION, SOLUTION INTRAVENOUS AS NEEDED
Status: DISCONTINUED | OUTPATIENT
Start: 2023-07-18 | End: 2023-07-18 | Stop reason: SURG

## 2023-07-18 RX ORDER — CLOPIDOGREL BISULFATE 75 MG/1
75 TABLET ORAL DAILY
Status: DISCONTINUED | OUTPATIENT
Start: 2023-07-18 | End: 2023-07-21 | Stop reason: HOSPADM

## 2023-07-18 RX ORDER — SODIUM BICARBONATE 1 MEQ/ML
50-100 SYRINGE (ML) INTRAVENOUS AS NEEDED
Status: DISCONTINUED | OUTPATIENT
Start: 2023-07-18 | End: 2023-07-21 | Stop reason: HOSPADM

## 2023-07-18 RX ORDER — PROTAMINE SULFATE 10 MG/ML
INJECTION, SOLUTION INTRAVENOUS AS NEEDED
Status: DISCONTINUED | OUTPATIENT
Start: 2023-07-18 | End: 2023-07-18 | Stop reason: SURG

## 2023-07-18 RX ORDER — HYDROMORPHONE HYDROCHLORIDE 1 MG/ML
0.25 INJECTION, SOLUTION INTRAMUSCULAR; INTRAVENOUS; SUBCUTANEOUS EVERY 4 HOURS PRN
Status: DISCONTINUED | OUTPATIENT
Start: 2023-07-18 | End: 2023-07-21 | Stop reason: HOSPADM

## 2023-07-18 RX ORDER — KETOROLAC TROMETHAMINE 30 MG/ML
30 INJECTION, SOLUTION INTRAMUSCULAR; INTRAVENOUS EVERY 6 HOURS PRN
Status: COMPLETED | OUTPATIENT
Start: 2023-07-18 | End: 2023-07-20

## 2023-07-18 RX ORDER — POLYETHYLENE GLYCOL 3350 17 G/17G
17 POWDER, FOR SOLUTION ORAL DAILY
Status: DISCONTINUED | OUTPATIENT
Start: 2023-07-18 | End: 2023-07-21 | Stop reason: HOSPADM

## 2023-07-18 RX ORDER — PHENYLEPHRINE HYDROCHLORIDE 10 MG/ML
INJECTION INTRAVENOUS AS NEEDED
Status: DISCONTINUED | OUTPATIENT
Start: 2023-07-18 | End: 2023-07-18 | Stop reason: SURG

## 2023-07-18 RX ORDER — ACETAMINOPHEN 325 MG/1
975 TABLET ORAL EVERY 6 HOURS
Status: DISCONTINUED | OUTPATIENT
Start: 2023-07-18 | End: 2023-07-21 | Stop reason: HOSPADM

## 2023-07-18 RX ORDER — BISACODYL 10 MG/1
10 SUPPOSITORY RECTAL AS NEEDED
Status: DISCONTINUED | OUTPATIENT
Start: 2023-07-21 | End: 2023-07-21 | Stop reason: HOSPADM

## 2023-07-18 RX ORDER — CHLORHEXIDINE GLUCONATE ORAL RINSE 1.2 MG/ML
15 SOLUTION DENTAL ONCE
Status: COMPLETED | OUTPATIENT
Start: 2023-07-18 | End: 2023-07-18

## 2023-07-18 RX ORDER — SODIUM CHLORIDE, SODIUM GLUCONATE, SODIUM ACETATE, POTASSIUM CHLORIDE AND MAGNESIUM CHLORIDE 30; 37; 368; 526; 502 MG/100ML; MG/100ML; MG/100ML; MG/100ML; MG/100ML
INJECTION, SOLUTION INTRAVENOUS CONTINUOUS PRN
Status: DISCONTINUED | OUTPATIENT
Start: 2023-07-18 | End: 2023-07-18 | Stop reason: SURG

## 2023-07-18 RX ORDER — LIDOCAINE HYDROCHLORIDE AND EPINEPHRINE 5; 5 MG/ML; UG/ML
INJECTION, SOLUTION INFILTRATION; PERINEURAL
Status: DISCONTINUED | OUTPATIENT
Start: 2023-07-18 | End: 2023-07-18 | Stop reason: HOSPADM

## 2023-07-18 RX ORDER — KETOROLAC TROMETHAMINE 15 MG/ML
INJECTION, SOLUTION INTRAMUSCULAR; INTRAVENOUS AS NEEDED
Status: DISCONTINUED | OUTPATIENT
Start: 2023-07-18 | End: 2023-07-18 | Stop reason: SURG

## 2023-07-18 RX ORDER — ONDANSETRON HYDROCHLORIDE 2 MG/ML
4 INJECTION, SOLUTION INTRAVENOUS EVERY 8 HOURS PRN
Status: DISCONTINUED | OUTPATIENT
Start: 2023-07-18 | End: 2023-07-21 | Stop reason: HOSPADM

## 2023-07-18 RX ORDER — BUPIVACAINE HYDROCHLORIDE 2.5 MG/ML
INJECTION, SOLUTION EPIDURAL; INFILTRATION; INTRACAUDAL
Status: DISCONTINUED | OUTPATIENT
Start: 2023-07-18 | End: 2023-07-18 | Stop reason: HOSPADM

## 2023-07-18 RX ORDER — ASPIRIN 81 MG/1
81 TABLET ORAL DAILY
Status: DISCONTINUED | OUTPATIENT
Start: 2023-07-18 | End: 2023-07-21 | Stop reason: HOSPADM

## 2023-07-18 RX ORDER — POTASSIUM CHLORIDE 14.9 MG/ML
INJECTION INTRAVENOUS AS NEEDED
Status: DISCONTINUED | OUTPATIENT
Start: 2023-07-18 | End: 2023-07-18 | Stop reason: SURG

## 2023-07-18 RX ORDER — HYDROMORPHONE HYDROCHLORIDE 1 MG/ML
0.25 INJECTION, SOLUTION INTRAMUSCULAR; INTRAVENOUS; SUBCUTANEOUS
Status: DISCONTINUED | OUTPATIENT
Start: 2023-07-18 | End: 2023-07-19

## 2023-07-18 RX ORDER — DEXAMETHASONE SODIUM PHOSPHATE 4 MG/ML
INJECTION, SOLUTION INTRA-ARTICULAR; INTRALESIONAL; INTRAMUSCULAR; INTRAVENOUS; SOFT TISSUE AS NEEDED
Status: DISCONTINUED | OUTPATIENT
Start: 2023-07-18 | End: 2023-07-18 | Stop reason: SURG

## 2023-07-18 RX ORDER — DEXMEDETOMIDINE HYDROCHLORIDE 4 UG/ML
.2-1.5 INJECTION, SOLUTION INTRAVENOUS
Status: DISCONTINUED | OUTPATIENT
Start: 2023-07-18 | End: 2023-07-19

## 2023-07-18 RX ORDER — ALBUMIN HUMAN 50 G/1000ML
500 SOLUTION INTRAVENOUS AS NEEDED
Status: DISCONTINUED | OUTPATIENT
Start: 2023-07-18 | End: 2023-07-19

## 2023-07-18 RX ORDER — EPHEDRINE SULFATE 50 MG/ML
INJECTION, SOLUTION INTRAVENOUS AS NEEDED
Status: DISCONTINUED | OUTPATIENT
Start: 2023-07-18 | End: 2023-07-18 | Stop reason: SURG

## 2023-07-18 RX ORDER — COLCHICINE 0.6 MG/1
0.6 TABLET ORAL DAILY
Status: DISCONTINUED | OUTPATIENT
Start: 2023-07-18 | End: 2023-07-21 | Stop reason: HOSPADM

## 2023-07-18 RX ORDER — DEXTROSE 50 % IN WATER (D50W) INTRAVENOUS SYRINGE
10-30 AS NEEDED
Status: DISCONTINUED | OUTPATIENT
Start: 2023-07-18 | End: 2023-07-19

## 2023-07-18 RX ORDER — GABAPENTIN 300 MG/1
300 CAPSULE ORAL DAILY
Status: DISCONTINUED | OUTPATIENT
Start: 2023-07-23 | End: 2023-07-21 | Stop reason: HOSPADM

## 2023-07-18 RX ORDER — METOPROLOL TARTRATE 25 MG/1
25 TABLET, FILM COATED ORAL 2 TIMES DAILY
Status: DISCONTINUED | OUTPATIENT
Start: 2023-07-18 | End: 2023-07-21

## 2023-07-18 RX ORDER — ROSUVASTATIN CALCIUM 20 MG/1
20 TABLET, COATED ORAL DAILY
COMMUNITY
End: 2023-07-26

## 2023-07-18 RX ORDER — SODIUM BICARBONATE 1 MEQ/ML
SYRINGE (ML) INTRAVENOUS AS NEEDED
Status: DISCONTINUED | OUTPATIENT
Start: 2023-07-18 | End: 2023-07-18 | Stop reason: SURG

## 2023-07-18 RX ORDER — DOCUSATE SODIUM 100 MG/1
100 CAPSULE, LIQUID FILLED ORAL 2 TIMES DAILY
Status: DISCONTINUED | OUTPATIENT
Start: 2023-07-18 | End: 2023-07-21 | Stop reason: HOSPADM

## 2023-07-18 RX ORDER — ROCURONIUM BROMIDE 10 MG/ML
INJECTION, SOLUTION INTRAVENOUS AS NEEDED
Status: DISCONTINUED | OUTPATIENT
Start: 2023-07-18 | End: 2023-07-18 | Stop reason: SURG

## 2023-07-18 RX ORDER — FUROSEMIDE 10 MG/ML
40 INJECTION INTRAMUSCULAR; INTRAVENOUS DAILY
Status: DISCONTINUED | OUTPATIENT
Start: 2023-07-21 | End: 2023-07-18

## 2023-07-18 RX ORDER — ATROPINE SULFATE 0.1 MG/ML
0.5 INJECTION INTRAVENOUS EVERY 5 MIN PRN
Status: DISCONTINUED | OUTPATIENT
Start: 2023-07-18 | End: 2023-07-21 | Stop reason: HOSPADM

## 2023-07-18 RX ORDER — FUROSEMIDE 10 MG/ML
40 INJECTION INTRAMUSCULAR; INTRAVENOUS DAILY
Status: DISCONTINUED | OUTPATIENT
Start: 2023-07-22 | End: 2023-07-20

## 2023-07-18 RX ORDER — ASPIRIN 81 MG/1
81 TABLET ORAL DAILY
COMMUNITY
End: 2023-07-26

## 2023-07-18 RX ORDER — FUROSEMIDE 10 MG/ML
40 INJECTION INTRAMUSCULAR; INTRAVENOUS
Status: DISCONTINUED | OUTPATIENT
Start: 2023-07-18 | End: 2023-07-20 | Stop reason: SDUPTHER

## 2023-07-18 RX ORDER — ROSUVASTATIN CALCIUM 20 MG/1
20 TABLET, COATED ORAL
Status: DISCONTINUED | OUTPATIENT
Start: 2023-07-18 | End: 2023-07-21 | Stop reason: HOSPADM

## 2023-07-18 RX ORDER — ALUMINUM HYDROXIDE, MAGNESIUM HYDROXIDE, AND SIMETHICONE 1200; 120; 1200 MG/30ML; MG/30ML; MG/30ML
30 SUSPENSION ORAL EVERY 4 HOURS PRN
Status: DISCONTINUED | OUTPATIENT
Start: 2023-07-18 | End: 2023-07-21 | Stop reason: HOSPADM

## 2023-07-18 RX ORDER — ALBUMIN HUMAN 50 G/1000ML
SOLUTION INTRAVENOUS AS NEEDED
Status: DISCONTINUED | OUTPATIENT
Start: 2023-07-18 | End: 2023-07-18 | Stop reason: SURG

## 2023-07-18 RX ORDER — SENNOSIDES 8.6 MG/1
1 TABLET ORAL 2 TIMES DAILY
Status: DISCONTINUED | OUTPATIENT
Start: 2023-07-18 | End: 2023-07-21 | Stop reason: HOSPADM

## 2023-07-18 RX ORDER — POTASSIUM CHLORIDE 750 MG/1
20 TABLET, EXTENDED RELEASE ORAL
Status: DISCONTINUED | OUTPATIENT
Start: 2023-07-18 | End: 2023-07-20 | Stop reason: SDUPTHER

## 2023-07-18 RX ORDER — POTASSIUM CHLORIDE 750 MG/1
20 TABLET, EXTENDED RELEASE ORAL DAILY
Status: DISCONTINUED | OUTPATIENT
Start: 2023-07-22 | End: 2023-07-20

## 2023-07-18 RX ORDER — OXYCODONE HYDROCHLORIDE 5 MG/1
5 TABLET ORAL EVERY 4 HOURS PRN
Status: DISCONTINUED | OUTPATIENT
Start: 2023-07-18 | End: 2023-07-21 | Stop reason: HOSPADM

## 2023-07-18 RX ORDER — POTASSIUM CHLORIDE 750 MG/1
20 TABLET, EXTENDED RELEASE ORAL DAILY
Status: DISCONTINUED | OUTPATIENT
Start: 2023-07-21 | End: 2023-07-18

## 2023-07-18 RX ORDER — ESMOLOL HYDROCHLORIDE 10 MG/ML
INJECTION INTRAVENOUS AS NEEDED
Status: DISCONTINUED | OUTPATIENT
Start: 2023-07-18 | End: 2023-07-18 | Stop reason: SURG

## 2023-07-18 RX ORDER — METOPROLOL SUCCINATE 25 MG/1
25 TABLET, EXTENDED RELEASE ORAL DAILY
Status: DISCONTINUED | OUTPATIENT
Start: 2023-07-18 | End: 2023-07-18

## 2023-07-18 RX ORDER — CALCIUM CHLORIDE INJECTION 100 MG/ML
0.5 INJECTION, SOLUTION INTRAVENOUS ONCE AS NEEDED
Status: DISCONTINUED | OUTPATIENT
Start: 2023-07-18 | End: 2023-07-21 | Stop reason: HOSPADM

## 2023-07-18 RX ORDER — FENTANYL CITRATE 50 UG/ML
INJECTION, SOLUTION INTRAMUSCULAR; INTRAVENOUS AS NEEDED
Status: DISCONTINUED | OUTPATIENT
Start: 2023-07-18 | End: 2023-07-18 | Stop reason: SURG

## 2023-07-18 RX ORDER — GABAPENTIN 300 MG/1
300 CAPSULE ORAL EVERY 12 HOURS
Status: DISCONTINUED | OUTPATIENT
Start: 2023-07-18 | End: 2023-07-21 | Stop reason: HOSPADM

## 2023-07-18 RX ORDER — ONDANSETRON 4 MG/1
4 TABLET, ORALLY DISINTEGRATING ORAL EVERY 8 HOURS PRN
Status: DISCONTINUED | OUTPATIENT
Start: 2023-07-18 | End: 2023-07-21 | Stop reason: HOSPADM

## 2023-07-18 RX ORDER — PROPOFOL 10 MG/ML
INJECTION, EMULSION INTRAVENOUS AS NEEDED
Status: DISCONTINUED | OUTPATIENT
Start: 2023-07-18 | End: 2023-07-18 | Stop reason: SURG

## 2023-07-18 RX ORDER — POTASSIUM CHLORIDE 14.9 MG/ML
20 INJECTION INTRAVENOUS EVERY 8 HOURS PRN
Status: DISCONTINUED | OUTPATIENT
Start: 2023-07-18 | End: 2023-07-21

## 2023-07-18 RX ORDER — NOREPINEPHRINE BITARTRATE/D5W 4MG/250ML
.5-8 PLASTIC BAG, INJECTION (ML) INTRAVENOUS
Status: DISCONTINUED | OUTPATIENT
Start: 2023-07-18 | End: 2023-07-19

## 2023-07-18 RX ORDER — MIDAZOLAM HYDROCHLORIDE 2 MG/2ML
INJECTION, SOLUTION INTRAMUSCULAR; INTRAVENOUS AS NEEDED
Status: DISCONTINUED | OUTPATIENT
Start: 2023-07-18 | End: 2023-07-18 | Stop reason: SURG

## 2023-07-18 RX ORDER — MAGNESIUM SULFATE HEPTAHYDRATE 500 MG/ML
INJECTION, SOLUTION INTRAMUSCULAR; INTRAVENOUS AS NEEDED
Status: DISCONTINUED | OUTPATIENT
Start: 2023-07-18 | End: 2023-07-18 | Stop reason: SURG

## 2023-07-18 RX ORDER — HEPARIN SODIUM 1000 [USP'U]/ML
INJECTION, SOLUTION INTRAVENOUS; SUBCUTANEOUS AS NEEDED
Status: DISCONTINUED | OUTPATIENT
Start: 2023-07-18 | End: 2023-07-18 | Stop reason: SURG

## 2023-07-18 RX ADMIN — SODIUM BICARBONATE 25 MEQ: 84 INJECTION, SOLUTION INTRAVENOUS at 09:51

## 2023-07-18 RX ADMIN — ALBUMIN (HUMAN) 500 ML: 12.5 SOLUTION INTRAVENOUS at 19:49

## 2023-07-18 RX ADMIN — SODIUM BICARBONATE 25 MEQ: 84 INJECTION, SOLUTION INTRAVENOUS at 10:17

## 2023-07-18 RX ADMIN — GENTAMICIN SULFATE 260 MG: 40 INJECTION, SOLUTION INTRAMUSCULAR; INTRAVENOUS at 08:25

## 2023-07-18 RX ADMIN — NOREPINEPHRINE BITARTRATE 5 MCG/MIN: at 08:40

## 2023-07-18 RX ADMIN — HYDROMORPHONE HYDROCHLORIDE 2 MG: 1 INJECTION, SOLUTION INTRAMUSCULAR; INTRAVENOUS; SUBCUTANEOUS at 09:08

## 2023-07-18 RX ADMIN — CEFAZOLIN 2 G: 2 INJECTION, POWDER, FOR SOLUTION INTRAMUSCULAR; INTRAVENOUS at 15:08

## 2023-07-18 RX ADMIN — PHENYLEPHRINE HYDROCHLORIDE 100 MCG: 10 INJECTION INTRAVENOUS at 08:20

## 2023-07-18 RX ADMIN — POTASSIUM CHLORIDE 20 MEQ: 14.9 INJECTION, SOLUTION INTRAVENOUS at 17:49

## 2023-07-18 RX ADMIN — SODIUM CHLORIDE, SODIUM GLUCONATE, SODIUM ACETATE, POTASSIUM CHLORIDE AND MAGNESIUM CHLORIDE: 526; 502; 368; 37; 30 INJECTION, SOLUTION INTRAVENOUS at 08:40

## 2023-07-18 RX ADMIN — CALCIUM CHLORIDE 250 MG: 100 INJECTION, SOLUTION INTRAVENOUS at 09:45

## 2023-07-18 RX ADMIN — PROPOFOL 200 MG: 10 INJECTION, EMULSION INTRAVENOUS at 08:20

## 2023-07-18 RX ADMIN — ONDANSETRON HYDROCHLORIDE 4 MG: 2 SOLUTION INTRAMUSCULAR; INTRAVENOUS at 08:50

## 2023-07-18 RX ADMIN — DEXAMETHASONE SODIUM PHOSPHATE 10 MG: 4 INJECTION, SOLUTION INTRAMUSCULAR; INTRAVENOUS at 08:20

## 2023-07-18 RX ADMIN — FENTANYL CITRATE 100 MCG: 50 INJECTION, SOLUTION INTRAMUSCULAR; INTRAVENOUS at 08:02

## 2023-07-18 RX ADMIN — ACETAMINOPHEN 975 MG: 325 TABLET, FILM COATED ORAL at 19:26

## 2023-07-18 RX ADMIN — PROPOFOL 30 MG: 10 INJECTION, EMULSION INTRAVENOUS at 12:19

## 2023-07-18 RX ADMIN — SODIUM BICARBONATE 25 MEQ: 84 INJECTION, SOLUTION INTRAVENOUS at 09:23

## 2023-07-18 RX ADMIN — FAMOTIDINE 20 MG: 10 INJECTION, SOLUTION INTRAVENOUS at 15:08

## 2023-07-18 RX ADMIN — FENTANYL CITRATE 25 MCG: 50 INJECTION, SOLUTION INTRAMUSCULAR; INTRAVENOUS at 12:29

## 2023-07-18 RX ADMIN — ROCURONIUM BROMIDE 50 MG: 10 INJECTION INTRAVENOUS at 09:09

## 2023-07-18 RX ADMIN — ASPIRIN 81 MG: 81 TABLET, COATED ORAL at 17:48

## 2023-07-18 RX ADMIN — MAGNESIUM SULFATE HEPTAHYDRATE 2 G: 40 INJECTION, SOLUTION INTRAVENOUS at 19:40

## 2023-07-18 RX ADMIN — PHENYLEPHRINE HYDROCHLORIDE 200 MCG: 10 INJECTION INTRAVENOUS at 08:25

## 2023-07-18 RX ADMIN — POTASSIUM CHLORIDE 20 MEQ: 14.9 INJECTION, SOLUTION INTRAVENOUS at 11:44

## 2023-07-18 RX ADMIN — CEFAZOLIN 2 G: 2 INJECTION, POWDER, FOR SOLUTION INTRAMUSCULAR; INTRAVENOUS at 22:48

## 2023-07-18 RX ADMIN — HEPARIN SODIUM 3000 UNITS: 1000 INJECTION, SOLUTION INTRAVENOUS; SUBCUTANEOUS at 10:43

## 2023-07-18 RX ADMIN — SODIUM CHLORIDE: 9 INJECTION, SOLUTION INTRAVENOUS at 13:40

## 2023-07-18 RX ADMIN — PHENYLEPHRINE HYDROCHLORIDE 200 MCG: 10 INJECTION INTRAVENOUS at 08:55

## 2023-07-18 RX ADMIN — POTASSIUM CHLORIDE 20 MEQ: 14.9 INJECTION, SOLUTION INTRAVENOUS at 13:22

## 2023-07-18 RX ADMIN — FENTANYL CITRATE 150 MCG: 50 INJECTION, SOLUTION INTRAMUSCULAR; INTRAVENOUS at 08:20

## 2023-07-18 RX ADMIN — PHENYLEPHRINE HYDROCHLORIDE 200 MCG: 10 INJECTION INTRAVENOUS at 08:43

## 2023-07-18 RX ADMIN — PHENYLEPHRINE HYDROCHLORIDE 100 MCG: 10 INJECTION INTRAVENOUS at 08:30

## 2023-07-18 RX ADMIN — GABAPENTIN 300 MG: 300 CAPSULE ORAL at 19:26

## 2023-07-18 RX ADMIN — EPHEDRINE SULFATE 5 MG: 50 INJECTION, SOLUTION INTRAVENOUS at 08:55

## 2023-07-18 RX ADMIN — PHENYLEPHRINE HYDROCHLORIDE 50 MCG: 10 INJECTION INTRAVENOUS at 10:15

## 2023-07-18 RX ADMIN — ESMOLOL HYDROCHLORIDE 10 MG: 100 INJECTION, SOLUTION INTRAVENOUS at 10:57

## 2023-07-18 RX ADMIN — POTASSIUM CHLORIDE 20 MEQ: 14.9 INJECTION, SOLUTION INTRAVENOUS at 09:33

## 2023-07-18 RX ADMIN — KETOROLAC TROMETHAMINE 30 MG: 15 INJECTION, SOLUTION INTRAMUSCULAR; INTRAVENOUS at 12:15

## 2023-07-18 RX ADMIN — CLOPIDOGREL BISULFATE 75 MG: 75 TABLET ORAL at 17:48

## 2023-07-18 RX ADMIN — CALCIUM CHLORIDE 1 G: 100 INJECTION, SOLUTION INTRAVENOUS at 16:44

## 2023-07-18 RX ADMIN — METOCLOPRAMIDE 5 MG: 5 INJECTION, SOLUTION INTRAMUSCULAR; INTRAVENOUS at 08:50

## 2023-07-18 RX ADMIN — ESMOLOL HYDROCHLORIDE 10 MG: 100 INJECTION, SOLUTION INTRAVENOUS at 11:31

## 2023-07-18 RX ADMIN — SUGAMMADEX 200 MG: 100 INJECTION, SOLUTION INTRAVENOUS at 12:33

## 2023-07-18 RX ADMIN — FAMOTIDINE 20 MG: 20 TABLET, FILM COATED ORAL at 19:27

## 2023-07-18 RX ADMIN — PHENYLEPHRINE HYDROCHLORIDE 100 MCG: 10 INJECTION INTRAVENOUS at 08:38

## 2023-07-18 RX ADMIN — PHENYLEPHRINE HYDROCHLORIDE 100 MCG: 10 INJECTION INTRAVENOUS at 08:36

## 2023-07-18 RX ADMIN — CALCIUM CHLORIDE 500 MG: 100 INJECTION, SOLUTION INTRAVENOUS at 11:47

## 2023-07-18 RX ADMIN — CHLORHEXIDINE GLUCONATE ORAL RINSE 15 ML: 1.2 SOLUTION DENTAL at 07:37

## 2023-07-18 RX ADMIN — FENTANYL CITRATE 50 MCG: 50 INJECTION, SOLUTION INTRAMUSCULAR; INTRAVENOUS at 11:18

## 2023-07-18 RX ADMIN — BUPIVACAINE HYDROCHLORIDE 20 ML: 2.5 INJECTION, SOLUTION EPIDURAL; INFILTRATION; INTRACAUDAL; PERINEURAL at 17:50

## 2023-07-18 RX ADMIN — ACETAMINOPHEN 975 MG: 325 TABLET, FILM COATED ORAL at 13:23

## 2023-07-18 RX ADMIN — ESMOLOL HYDROCHLORIDE 20 MG: 100 INJECTION, SOLUTION INTRAVENOUS at 12:09

## 2023-07-18 RX ADMIN — KETOROLAC TROMETHAMINE 30 MG: 30 INJECTION, SOLUTION INTRAMUSCULAR; INTRAVENOUS at 16:50

## 2023-07-18 RX ADMIN — PROTAMINE SULFATE 100 MG: 10 INJECTION, SOLUTION INTRAVENOUS at 11:58

## 2023-07-18 RX ADMIN — ALBUMIN (HUMAN) 500 ML: 12.5 SOLUTION INTRAVENOUS at 22:10

## 2023-07-18 RX ADMIN — FENTANYL CITRATE 25 MCG: 50 INJECTION, SOLUTION INTRAMUSCULAR; INTRAVENOUS at 12:25

## 2023-07-18 RX ADMIN — ALBUMIN (HUMAN) 500 ML: 12.5 SOLUTION INTRAVENOUS at 08:55

## 2023-07-18 RX ADMIN — MAGNESIUM SULFATE HEPTAHYDRATE 2 G: 500 INJECTION, SOLUTION INTRAMUSCULAR; INTRAVENOUS at 08:50

## 2023-07-18 RX ADMIN — METOPROLOL TARTRATE 12.5 MG: 25 TABLET, FILM COATED ORAL at 19:26

## 2023-07-18 RX ADMIN — ONDANSETRON HYDROCHLORIDE 4 MG: 2 SOLUTION INTRAMUSCULAR; INTRAVENOUS at 12:15

## 2023-07-18 RX ADMIN — SODIUM CHLORIDE: 9 INJECTION, SOLUTION INTRAVENOUS at 07:37

## 2023-07-18 RX ADMIN — PROPOFOL 50 MG: 10 INJECTION, EMULSION INTRAVENOUS at 12:24

## 2023-07-18 RX ADMIN — INSULIN HUMAN 3 UNITS: 100 INJECTION, SOLUTION PARENTERAL at 09:52

## 2023-07-18 RX ADMIN — METOPROLOL SUCCINATE 25 MG: 25 TABLET, EXTENDED RELEASE ORAL at 07:40

## 2023-07-18 RX ADMIN — ROSUVASTATIN CALCIUM 20 MG: 20 TABLET, FILM COATED ORAL at 17:48

## 2023-07-18 RX ADMIN — MIDAZOLAM HYDROCHLORIDE 2 MG: 1 INJECTION, SOLUTION INTRAMUSCULAR; INTRAVENOUS at 08:02

## 2023-07-18 RX ADMIN — COLCHICINE 0.6 MG: 0.6 TABLET ORAL at 17:53

## 2023-07-18 RX ADMIN — SODIUM CHLORIDE: 9 INJECTION, SOLUTION INTRAVENOUS at 08:40

## 2023-07-18 RX ADMIN — VANCOMYCIN HYDROCHLORIDE 1 G: 1 INJECTION, POWDER, LYOPHILIZED, FOR SOLUTION INTRAVENOUS at 07:43

## 2023-07-18 RX ADMIN — HEPARIN SODIUM 12000 UNITS: 1000 INJECTION, SOLUTION INTRAVENOUS; SUBCUTANEOUS at 11:34

## 2023-07-18 RX ADMIN — ROCURONIUM BROMIDE 10 MG: 10 INJECTION INTRAVENOUS at 10:47

## 2023-07-18 RX ADMIN — CALCIUM CHLORIDE 500 MG: 100 INJECTION, SOLUTION INTRAVENOUS at 11:59

## 2023-07-18 RX ADMIN — ROCURONIUM BROMIDE 100 MG: 10 INJECTION INTRAVENOUS at 08:21

## 2023-07-18 NOTE — ANESTHESIA PROCEDURE NOTES
Arterial Line:    Start time: 7/18/2023 8:06 AM  End time: 7/18/2023 8:14 AM    An arterial line was placed in the OR for the following indication(s): continuous blood pressure monitoring and blood sampling needed.    A 20 G (size), 1 and 3/4 inch (length), Cook (type) catheter was placed,   Seldinger technique used, into the Right radial artery, secured by Tegaderm.      Procedure performed using ultrasound guidance and surface landmarks.  Image captured and stored.  Image visualization comments: Ultrasound used to visualize the placement of wire and/or needle in appropriate artery.    Events:  patient tolerated procedure well with no complications.    The supervising anesthesiologist was   Performed By: anesthesiologist  Attending: Barber Palencia MD

## 2023-07-18 NOTE — ANESTHESIA PROCEDURE NOTES
Airway  Urgency: elective    Start Time: 7/18/2023 8:23 AM  Stop Time: 7/18/2023 8:23 AM    Airway not difficult    General Information and Staff    Patient location during procedure: OR  Anesthesiologist: Barber Palencia MD  Performed: anesthesiologist   Performed by: Barber Palencia MD  Authorized by: Barber Palencia MD      Indications and Patient Condition  Indications for airway management: anesthesia  Sedation level: deep  Preoxygenated: yes  Patient position: sniffing  MILS maintained throughout  Mask difficulty assessment: 1 - vent by mask    Final Airway Details  Final airway type: endotracheal airway      Successful airway: ETT and ETT wEVAC  Cuffed: yes   Successful intubation technique: video laryngoscopy  Facilitating devices/methods: intubating stylet and bronchial blockers  Endotracheal tube insertion site: oral  Blade: Noam  Blade size: #4  ETT size (mm): 8.5  Cormack-Lehane Classification: grade I - full view of glottis  Placement verified by: chest auscultation and capnometry   Measured from: lips  ETT to lips (cm): 22  Number of attempts at approach: 1  Number of other approaches attempted: 0  Atraumatic airway insertion

## 2023-07-18 NOTE — ANESTHESIA POSTPROCEDURE EVALUATION
Patient: Chidi Ramsey    Procedure Summary     Date: 07/18/23 Room / Location: [facility] OR 3 / [facility] OR    Anesthesia Start: 0756 Anesthesia Stop: 1304    Procedure: OP CAB MID/THORACOTOMY ELENA, ROBOTIC (Left: Chest) Diagnosis:       Coronary artery disease involving native coronary artery of native heart with angina pectoris (CMS/HCC)      (CAD)    Surgeons: Yvan Kaba DO Responsible Provider: Barber Palencia MD    Anesthesia Type: general ASA Status: 4          Anesthesia Type: general  PACU Vitals  7/18/2023 1246 - 7/18/2023 1305      7/18/2023  1256             Arterial Line BP: 128/58    Temp: 36.4 °C (97.5 °F)    Pulse: 96    Resp: 36    SpO2: 100 %            Anesthesia Post Evaluation    Pain management: adequate  Patient location during evaluation: ICU  Patient participation: complete - patient participated  Level of consciousness: awake and alert  Cardiovascular status: acceptable  Airway Patency: adequate  Respiratory status: acceptable and face mask  Hydration status: acceptable  Anesthetic complications: no

## 2023-07-18 NOTE — H&P
Cardiac Surgery History & Physical    Cardiac Surgery Consult Note        Subjective   Chidi Ramsey is a 62 y.o. male who was admitted for Shortness of breath [R06.02]  Abnormal nuclear stress test [R94.39]. Patient was referred by Dr. West Bai for Robotic revascularization of CCAD of the Ostial LAD.   Patient is 62 y.o. male with a Pmhx significant for elevated triglycerides who presented today to undergo an elective heart catheterization due to abnormal stress test findings. Prior to today patient had been experiencing atypical chest pain and shortness of breath prompting further evaluation by his Cardiologist Dr. Pham. On June 13 he underwent a Nuclear stress test which showed  moderate size anterior septal defect with mild reversibility as well in the apical septum with an EF of 55%. Of note he also had a 6 day trial with a ZIO patch for recent episode of palpitations which was notable for a 12 beat run of V-tach. Today he underwent a LHC which demonstrated 100% ostial LAD chronic total occlusion with left to left and right to left collaterals and mild nonobstructive disease in the remaining coronary tree. CTS was subsequently consulted evaluation of Robotic revascularization.      Patient seen and evaluated in Cath lab holding accompanied by his wife and Daughter. I discussed with him Cath findings and reason for consult. He reports that prior to Catheterization he has not been feeling the best but has remained relatively active by intermittently playing golf. He denies any current chest pain, shortness of breath, palpitations, or dizziness.         Pertinent radiology results reviewed. Pertinent lab results reviewed.     Medical History:   Medical History        Past Medical History:   Diagnosis Date   • Mixed hyperlipidemia 6/13/2023   • Palpitations 06/12/2023   • SOB (shortness of breath)              Surgical History:   Surgical History         Past Surgical History:   Procedure  "Laterality Date   • APPENDECTOMY         2019            Allergies: Augmentin [amoxicillin-pot clavulanate]     Current Medications   No current facility-administered medications for this encounter.            Social History:   Social History   Social History            Socioeconomic History   • Marital status:    Tobacco Use   • Smoking status: Never   • Smokeless tobacco: Never   Vaping Use   • Vaping Use: Never used   Substance and Sexual Activity   • Alcohol use: Yes       Comment: socially   • Drug use: Never   • Sexual activity: Defer            Family History:         Family History   Problem Relation Age of Onset   • Multiple sclerosis Biological Mother     • Hyperlipidemia Biological Father     • Hypertension Biological Father     • Diabetes Biological Father           Review of Systems  Constitutional: negative for fevers, fatigue and chills  Eyes: negative for blurred vision and visual disturbance  Ears, nose, mouth, throat, and face: negative for hoarseness and voice change  Respiratory: negative for shortness of breath, dyspnea on exertion and chest pain/tightness  Cardiovascular: positive for chest pressure/discomfort and palpitations  Gastrointestinal: negative for abdominal pain, nausea and vomiting  Genitourinary:negative for painful urination and urinary incontinence  Hematologic/lymphatic: negative for lymphadenopathy and swelling  Musculoskeletal:negative for back pain, neck pain and muscle weakness  Neurological: negative for confusion, vertigo, gait problems, headaches, numbness and weakness  Behavioral/Psych: negative  Allergic/Immunologic: negative     Vital signs in last 24 hours:  Heart Rate:  [64-71] 70  Resp:  [12-19] 19  BP: (120-128)/(69-85) 122/73           Objective   Physical Exam  Visit Vitals  BP (!) 140/91   Pulse 70   Resp 19   Ht 1.778 m (5' 10\")   Wt 85.7 kg (189 lb)   SpO2 96%   BMI 27.12 kg/m²         General Appearance:    Alert, cooperative, no distress, appears " stated age   Head:    Normocephalic, without obvious abnormality, atraumatic   Eyes:    PERRL, conjunctiva/corneas clear, EOM's intact, fundi     benign, both eyes        Ears:    Normal TM's and external ear canals, both ears   Nose:   Nares normal, septum midline, mucosa normal, no drainage    or sinus   tenderness   Throat:   Lips, mucosa, and tongue normal; teeth and gums normal   Neck:   Supple, symmetrical, trachea midline, no adenopathy;        thyroid:  No enlargement/tenderness/nodules; no carotid    bruit or JVD   Back:     Symmetric, no curvature, ROM normal, no CVA tenderness   Lungs:     Clear to auscultation bilaterally, respirations unlabored   Chest wall:    No tenderness or deformity   Heart:    Regular rate and rhythm, S1 and S2 normal, no murmur, rub   or gallop   Abdomen:     Soft, non-tender, bowel sounds active all four quadrants,     no masses, no organomegaly   Genitalia:    Normal male without lesion, discharge or tenderness   Rectal:    Normal tone, normal prostate, no masses or tenderness;    guaiac negative stool   Extremities:  Musculoskeletal:   Extremities normal, atraumatic, no cyanosis or edema    No injury or deformity   Pulses:   2+ and symmetric all extremities   Skin:   Skin color, texture, turgor normal, no rashes or lesions   Lymph nodes:   Cervical, supraclavicular, and axillary nodes normal   Neurologic:     Behavior/  Emotional:   CNII-XII intact. Normal strength, sensation and reflexes       throughout    Appropriate, cooperative         Labs  I have reviewed the patient's pertinent labs. Pertinent labs are within normal limits.     Imaging  I have independently reviewed the pertinent imaging from the last 24 hrs.     ECG/Telemetry  I have independently reviewed the telemetry. No events for the last 24 hours.           Assessment   62 y.o. male being consulted for Surgical revascularization evaluation for CAD of ostial LAD.           Plan   Patient seen and evaluated at  bedside accompanied by wife and daughter.  -[facility] today notable for 100% Ostial LAD .  -Preoperative workup ordered for Robotic CABG.              Carotids, ECHO, CXR              CBC, CMP, TSH/T4, A1C, MRSA, UA  Patient seen and evaluated at bedside by Dr. Jimenez and Dr. Kaba.              Surgical/Blood consent obtained and scanned into Media.  -Surgical timing TBD. Office will call patient with Definitive surgical date.       Attestation signed by Yvan Kaba DO at 7/16/2023  9:20 PM     I have personally reviewed this patient's cardiac catheterization films and discussed findings with Mildred Bai interventional cardiology, and Dr Garcia, Primary CARD.  Robotic CABG is recommended and determined as planned.    The patient was seen and examined while in the cath lab holding area.   An independent interval history was obtained and physical exam performed.  I agree with the information and findings as documented above.  Robotic assisted coronary artery bypass surgery was described, risks, benefits and alternatives were discussed.  Questions were answered and concerns addressed.  The patient is agreeable to proceed.   I obtained the surgical consent.    Orders were placed for preop labs, radiology MRSA, carotid U/S and ECHOcardiogram,  I will personally review results prior to surgery, on Tuesday 7/18/2023.     Yvan Kaba DO

## 2023-07-18 NOTE — BRIEF OP NOTE
OP CAB MID/THORACOTOMY ELENA, ROBOTIC (L) Procedure Note    Procedure:    OP CAB MID/THORACOTOMY ELENA, ROBOTIC  CPT(R) Code:  44479 - MI CABG, ARTERIAL, SINGLE      Pre-op Diagnosis     * Coronary artery disease involving native coronary artery of native heart with angina pectoris (CMS/HCC) [I25.119]       Post-op Diagnosis     * Coronary artery disease involving native coronary artery of native heart with angina pectoris (CMS/HCC) [I25.119]    Surgeon(s) and Role:     * Yvan Kaba, DO - Primary    Anesthesia: General    Staff:   Circulator: Jael Bowling, JC; Giorgio Sosa RN  Physician Assistant: Anuradha Escalante PA C  Scrub Person: Chidi Jacome    Procedure Details   Robotic OPCABG x 1: LIMA to LAD    No blood transfused intraoperatively. Extubated prior to transfer to CTICU.    Estimated Blood Loss: No blood loss documented.    Specimens:                Order Name Source Comment Collection Info Order Time   REPEAT ABO/RH (TYPE CHECK) Blood, Venous  Collected By: Kinsey Peng RN 7/18/2023  6:37 AM     Release to patient   Immediate        BASIC METABOLIC PANEL Blood, Venous  Collected By: Jaron Mitchell RN 7/18/2023 12:53 PM     Release to patient   Immediate        MAGNESIUM Blood, Venous  Collected By: Jaron Mitchell RN 7/18/2023 12:53 PM     Release to patient   Immediate        PHOSPHORUS Blood, Venous  Collected By: Jaron Mitchell RN 7/18/2023 12:53 PM     Release to patient   Immediate        CBC Blood, Venous  Collected By: Jraon Mitchell RN 7/18/2023 12:53 PM     Release to patient   Immediate        PROTIME-INR Blood, Venous  Collected By: Jaron Mitchell RN 7/18/2023 12:53 PM     Release to patient   Immediate        PTT Blood, Venous  Collected By: Jaron Mitchell RN 7/18/2023 12:53 PM     Release to patient   Immediate        PHOSPHORUS Blood, Venous   7/18/2023 12:53 PM     Release to patient   Immediate        MAGNESIUM Blood, Venous   7/18/2023 12:53 PM     Release to patient    Immediate        POTASSIUM Blood, Venous  Collected By: Jaron Mitchell RN 7/18/2023 12:53 PM     Release to patient   Immediate        BASIC METABOLIC PANEL Blood, Venous   7/18/2023 12:53 PM     Release to patient   Immediate        CBC Blood, Venous   7/18/2023 12:53 PM     Release to patient   Immediate        CALCIUM, IONIZED Blood, Venous  Collected By: Jaron Mitchell RN 7/18/2023 12:53 PM     Release to patient   Immediate        PREPARE RBC  Pre-op for Procedure  H Standard Grouping  7/18/2023  6:37 AM     Transfusion indications   Pre-OP major surgery with bleeding risk          Has consent been obtained?   Yes          Are you aware of this patient having previously identified antibodies?   NO          Does this Patient have Sickle Cell Disease/Sickle Cell Anemia?   NO              Drains:   Chest Tube 1 Left Pleural  (Active)       Chest Tube 2 Left Pleural  (Active)       Urethral Catheter Temperature probe 16 Fr (Active)   Output (mL) 225 mL 07/18/23 1300   Net Urine Output (mL) 225 mL 07/18/23 1300       Implants: * No implants in log *           Complications:  None; patient tolerated the procedure well.           Disposition: ICU - extubated and stable.           Condition: stable    Yvan Kaba DO  Phone Number: [phone]

## 2023-07-18 NOTE — ANESTHESIA PROCEDURE NOTES
Central Venous Line:      Start time: 7/18/2023 8:26 AM    End time: 7/18/2023 8:40 AM        A cardiac line was placed in the OR for the following indication(s): central venous access and CVP monitoring.      Sterility preparation included the following: provider hand hygiene performed prior to insertion and all 5 sterile barriers used (gloves, gown, cap, mask, large sterile drape) during insertion.      The patient was placed in Trendelenburg position. Right internal jugular vein was prepped.    The site was prepped with Chlorhexidine.  Catheter size: 8 Fr. 16 (length), double lumen was placed.  This catheter was not an oximetric catheter.    During the procedure, the following specific steps were taken: target vein identified, needle advanced into vein and blood aspirated and guidewire advanced into vein.  Seldinger technique used      Procedure performed using ultrasound guidance and surface landmarks.  Sterile gel and probe cover used in ultrasound-guided central venous catheter insertion.  Image captured and stored.  Image visualization comments: Ultrasound used to visualize the placement of  wire and/or needle in appropriate vein.    pulmonary artery catheterization not placed             Intravenous verification was obtained by ultrasound and venous blood return.      Post insertion care included: all ports aspirated, all ports flushed easily, guidewire removed intact, Biopatch applied, line sutured in place and dressing applied.    During the procedure the patient experienced: patient tolerated procedure well with no complications.                    Staffing  Performed: anesthesiologist   Anesthesiologist: Barber Palencia MD  Performed by: Barber Palencia MD  Authorized by: Barber Palencia MD

## 2023-07-18 NOTE — CONSULTS
Physical Medicine and Rehabilitation Consult Note    Referring Physician: Yvan Kaba DO [[id number]]      Subjective   Chidi Ramsey is a 62 y.o. male with past medical history significant for hyperlipidemia who presents to [facility] from home on 7/18/2023 for scheduled CABG x1.  Medical record reviewed.  In summary, patient was developing atypical chest pain preoperatively. On June 13 he underwent a Nuclear stress test which showed  moderate size anterior septal defect with mild reversibility as well in the apical septum with an EF of 55%.  Left heart cath on 7/6/2023 demonstrated 100% ostial LAD .  He was evaluated by Dr. Kaba after his cardiac catheterization and recommended surgical evaluation.  Postoperatively, he is doing well.  He has mild chest wall discomfort that worsens with movement.  Improved with multimodal analgesia.  He denies new focal numbness, tingling, weakness.  At baseline, he is independent for mobility, transfers, ADLs.  He lives with his wife in a two-story home, 2 steps to enter.  He is working full-time    Past Medical History:   Diagnosis Date   • Mixed hyperlipidemia 6/13/2023   • Palpitations 06/12/2023   • SOB (shortness of breath)        Past Surgical History:   Procedure Laterality Date   • APPENDECTOMY      2019       Allergies: Augmentin [amoxicillin-pot clavulanate]  Social history: .  Non smoker  History also provided by chart review    Family History:   Family History   Problem Relation Age of Onset   • Multiple sclerosis Biological Mother    • Hyperlipidemia Biological Father    • Hypertension Biological Father    • Diabetes Biological Father    • No Known Problems Biological Sister    • No Known Problems Biological Sister    • No Known Problems Biological Sister    • No Known Problems Biological Sister    • No Known Problems Biological Brother         Meds:    • acetaminophen  975 mg oral q6h   • aspirin  81 mg oral Daily   • ceFAZolin  2 g intravenous q8h INT   •  "clopidogreL  75 mg oral Daily   • colchicine  0.6 mg oral Daily   • docusate sodium  100 mg oral BID   • famotidine  20 mg intravenous BID    Or   • famotidine  20 mg oral BID   • furosemide  40 mg intravenous BID (am, 4p)    And   • potassium chloride  20 mEq oral BID (am, 4p)   • [START ON 7/22/2023] furosemide  40 mg intravenous Daily    And   • [START ON 7/22/2023] potassium chloride  20 mEq oral Daily   • gabapentin  300 mg oral q12h WANDER    Followed by   • [START ON 7/23/2023] gabapentin  300 mg oral Daily   • magnesium oxide  400 mg oral BID   • metoprolol tartrate  12.5 mg oral BID    Or   • metoprolol tartrate  25 mg oral BID   • multivitamin  1 tablet oral Daily   • polyethylene glycol  17 g oral Daily   • rosuvastatin  20 mg oral Daily (6p)   • senna  1 tablet oral BID       Review of Systems  All 11 systems were reviewed and negative except as noted in the HPI.    Objective       Lab Results   Component Value Date    WBC 16.51 (H) 07/18/2023    HGB 11.7 (L) 07/18/2023    HCT 33.0 (L) 07/18/2023     07/18/2023    CHOL 184 06/16/2023    TRIG 177 (H) 06/16/2023    HDL 50 06/16/2023    ALT 17 07/06/2023    AST 21 07/06/2023     07/18/2023    K 3.5 07/18/2023     (H) 07/18/2023    CREATININE 0.7 07/18/2023    BUN 13 07/18/2023    CO2 24 07/18/2023    TSH 2.85 07/06/2023    INR 1.3 07/18/2023    HGBA1C 5.3 06/16/2023       Labs   Labs from today reviewed.  WBC 16.5, hemoglobin 11.7, creatinine 0.7, potassium 3.5, sodium 141  Imaging   Postop chest x-ray from today personally reviewed.  Right IJ.  Mild cardiomegaly.  No significant effusion.    Physical Exam  Visit Vitals  /79   Pulse 73   Temp 36.4 °C (97.5 °F) (Bladder)   Resp 18   Ht 1.778 m (5' 10\")   Wt 86.2 kg (190 lb)   SpO2 98%   BMI 27.26 kg/m²     General: No acute distress.  Resting comfortably in the recliner.  Family at the bedside.  HEENT: No jaundice.  Oral mucosa moist.   Atraumatic.    Neck: Supple.  Right IJ  Lungs: " Breathing unlabored.  No rales or rhonchi.  Chest tubes to drainage  Heart: RRR. No peripheral edema.  Abdomen: Bowel sounds: na. Soft, NT   Skin: No rash. Warm and dry   Extremities: No acutely inflamed joints.  ROM functional.  Psych: pleasant, appropriate affect   Neurological:  Alert and oriented with intact speech and cognition.  Sensation: subjectively preserved to touch.  Motor testing shows no focal weakness.  5/5 BUE and BLE    Assessment     Difficulty walking ADL deficit: Postoperative in the setting of severe CAD s/p CABG x1 on 7/18/2023.  Expect this to improve with gradual mobilization.  Will initiate cardiac rehab.  Given the patient was previously functionally independent, goals will be to ambulate greater than 200 feet, transfer and complete ADLs independently prior to returning family can provide supervision and assist  Rehab rec: Anticipate DC home  Plan of care was discussed with primary team    Naima Cook DO  7/18/2023  4:05 PM  Pager [phone]

## 2023-07-18 NOTE — PLAN OF CARE
Care Coordination Admission Assessment Note    General Information:  Readmission Within the last 30 days: no previous admission in last 30 days  Does patient have a : Yes  Patient-Specific Goals (include timeframe): Symptom improvement    Living Arrangements:  Arrived From: home  Current Living Arrangements: home  People in Home: spouse  Home Accessibility: stairs to enter home (Group), stairs within home (Group)  Living Arrangement Comments: Pt lives with spouse in a large [facility] with 4STE with possible FFSU.    Housing Stability and Financial Resources (SDOH):  In the last 12 months, was there a time when you were not able to pay the mortgage or rent on time?: No  In the last 12 months, how many places have you lived?:    In the last 12 months, was there a time when you did not have a steady place to sleep or slept in a shelter (including now)?: No  How hard is it for you to pay for the very basics like food, housing, medical care, and heating?: Not hard at all    Functional Status Prior to Admission:   Assistive Device/Animal Currently Used at Home: none  Functional Status Comments: PLOF is iADLs without AD  IADL Comments:       Supports and Services:  Current Outpatient/Agency/Support Group: none  Type of Current Home Care Services:    History of home care episode or rehab stay: None    Discharge Needs Assessment:   Concerns to be Addressed: care coordination/care conferences, discharge planning  Current Discharge Risk:    Anticipated Changes Related to Illness: inability to work, inability to return to school, inability to care for someone else    Patient/Family Anticipated Discharge Plan:  Patient/Family Anticipates Transition To: home with help/services, home with family  Patient/Family Anticipated Services at Transition: home health care    Connection to Community  Not applicable      Patient Choice:   Offered/Gave Vendor List: yes  Patient's Choice of Community Agency(s): [facility]  Patient and/or patient guardian/advocate was made aware of their right to choose a provider. A list of eligible providers was presented and reviewed with the patient and/or patient guardian/advocate in written and/or verbal form. The list delineates providers in the patient’s desired geographic area who are participating in the Medicare program and/or providers contracted with the patient’s primary insurance. The Medicare list and quality ratings were obtained from the Medicare.gov [medicare.gov] website.    Anticipated Discharge Plan:     Anticipated Discharge Disposition: home with home health  Type of Home Care Services: nursing      Transportation Needs (SDOH):  Transportation Concerns: none  Transportation Anticipated: family or friend will provide  Is Out of Hospital DNR needed at discharge?: no    In the past 12 months, has lack of transportation kept you from medical appointments or from getting medications?: No  In the past 12 months, has lack of transportation kept you from meetings, work, or from getting things needed for daily living?: No    Concerns - comments: - Per info received at surgical rounds today, pt is POD 0 s/p CABG. Pt is anticipated to d/c to home with [facility]. JC Jameson will continue to follow to assist with transitions of care. -CHRIS Martinez, [facility], [facility] [phone]/secure chat

## 2023-07-18 NOTE — OP NOTE
Cardiac Surgery         [facility]  [id number]  1960 7/18/2023    PREOPERATIVE DIAGNOSES:  1.  Coronary artery disease  2.  Hypertension  3.  Dyslipidemia    POSTOPERATIVE DIAGNOSE  1. Same  OPERATIVE PROCEDURE:   robotic-assisted small anterior thoracotomy, single coronary artery bypass, with left internal mammary artery bypass to the anterior descending artery.    SURGEON: Yvan Kaba D.O  ASSISTANT: Katherin  ANESTHESIA: General, with Dr. Palencia    OPERATIVE PROCEDURE: The patient was placed in the supine position, general anesthesia, he was prepped and draped. Three ports were placed in the standard fashion. An opening was made in the posterior pericardium. The anterior pericardial fat was retracted laterally. The pericardium was opened. The anterior descending and its diagonal branch were easily identified and a portion of the pericardium was taken medially. The target area was marked with clips and then the left mammary was dissected in the standard skeletonized fashion, and when the dissection was complete, the end of the mammary was clipped, transected, and tacked to the epicardium at the target site. A Jas drain was placed through the right port with that completed. The robotic instruments were removed. The endoscopic port was enlarged slightly. A small soft tissue retractor was placed. It was exactly over the target area. The mammary was brought into the field and using a [employer] stabilizer, a 1.75 shunt was placed and an anastomosis fashioned uneventfully using the mammary with a running suture of 7-0 Prolene. The flows were checked, and the flows were about 50 mL. PROTAMINE was given. Hemostasis was achieved. A #19 Jas was placed through the left port site and then the chest was closed with 2 paracostal sutures of #2 Ethibond, and then the remainder of the chest was closed in layers with a subcuticular skin closure. Local anesthesia was applied at each port site, as well as  an intercostal block.     The surgical PA was required and present during this entire surgical procedure; facilitating with the opening, assisting with all integral elements of robotic coronary artery bypass grafting, including instrument exchange, with closure and transport to the CT ICU. The patient was taken from the OR uneventfully.    BLOOD LOSS: 100    SPECIMENS: None    IMPLANTS: None             Yvan Kaba, DO    This document was generated utilizing voice recognition technology. Please excuse any typographical errors which may be present.

## 2023-07-18 NOTE — OR SURGEON
Pre-Procedure patient identification:  No change in H&P.   I am the primary operating surgeon/proceduralist and I have identified the patient    Yvan Kaba,

## 2023-07-18 NOTE — ANESTHESIOLOGIST PRE-PROCEDURE ATTESTATION
Pre-Procedure Patient Identification:  I am the Primary Anesthesiologist and have identified the patient on 07/18/23 at 7:27 AM.   I have confirmed the procedure(s) will be performed by the following surgeon/proceduralist Yvan Kaba DO.

## 2023-07-19 ENCOUNTER — APPOINTMENT (INPATIENT)
Dept: RADIOLOGY | Facility: HOSPITAL | Age: 63
DRG: 236 | End: 2023-07-19
Payer: COMMERCIAL

## 2023-07-19 LAB
ANION GAP SERPL CALC-SCNC: 7 MEQ/L (ref 3–15)
ATRIAL RATE: 52
BASE EXCESS BLDA CALC-SCNC: -0.8 MEQ/L
BASE EXCESS BLDA CALC-SCNC: -1.3 MEQ/L
BASE EXCESS BLDA CALC-SCNC: -1.5 MEQ/L
BASE EXCESS BLDA CALC-SCNC: -1.9 MEQ/L
BASE EXCESS BLDA CALC-SCNC: 0 MEQ/L
BUN SERPL-MCNC: 13 MG/DL (ref 7–25)
CA-I BLD-SCNC: 1.2 MMOL/L (ref 1.15–1.27)
CA-I BLD-SCNC: 1.22 MMOL/L (ref 1.15–1.27)
CA-I BLD-SCNC: 1.22 MMOL/L (ref 1.15–1.27)
CA-I BLD-SCNC: 1.24 MMOL/L (ref 1.15–1.27)
CA-I BLD-SCNC: 1.24 MMOL/L (ref 1.15–1.27)
CALCIUM SERPL-MCNC: 8.6 MG/DL (ref 8.6–10.3)
CHLORIDE BLDA-SCNC: 106 MEQ/L (ref 98–109)
CHLORIDE SERPL-SCNC: 107 MEQ/L (ref 98–107)
CO2 BLDA-SCNC: 25 MEQ/L (ref 22–32)
CO2 BLDA-SCNC: 26 MEQ/L (ref 22–32)
CO2 SERPL-SCNC: 24 MEQ/L (ref 21–31)
CREAT SERPL-MCNC: 0.8 MG/DL (ref 0.7–1.3)
ERYTHROCYTE [DISTWIDTH] IN BLOOD BY AUTOMATED COUNT: 12.1 % (ref 11.6–14.4)
GFR SERPL CREATININE-BSD FRML MDRD: >60 ML/MIN/1.73M*2
GLUCOSE BLDA-MCNC: 115 MG/DL (ref 70–99)
GLUCOSE BLDA-MCNC: 121 MG/DL (ref 70–99)
GLUCOSE BLDA-MCNC: 121 MG/DL (ref 70–99)
GLUCOSE BLDA-MCNC: 129 MG/DL (ref 70–99)
GLUCOSE BLDA-MCNC: 138 MG/DL (ref 70–99)
GLUCOSE SERPL-MCNC: 145 MG/DL (ref 70–99)
HCO3 BLDA-SCNC: 23 MEQ/L (ref 21–28)
HCO3 BLDA-SCNC: 24 MEQ/L (ref 21–28)
HCO3 BLDA-SCNC: 25 MEQ/L (ref 21–28)
HCT VFR BLDCO AUTO: 33.7 % (ref 40.1–51)
HGB BLD-MCNC: 11.9 G/DL (ref 13.7–17.5)
HGB BLDA-MCNC: 11.8 G/DL (ref 14–17.5)
HGB BLDA-MCNC: 12.1 G/DL (ref 14–17.5)
HGB BLDA-MCNC: 12.2 G/DL (ref 14–17.5)
HGB BLDA-MCNC: 12.3 G/DL (ref 14–17.5)
HGB BLDA-MCNC: 12.5 G/DL (ref 14–17.5)
LACTATE BLDA-SCNC: 1 MMOL/L (ref 0.4–1.6)
LACTATE BLDA-SCNC: 1.1 MMOL/L (ref 0.4–1.6)
LACTATE BLDA-SCNC: 1.1 MMOL/L (ref 0.4–1.6)
LACTATE BLDA-SCNC: 1.2 MMOL/L (ref 0.4–1.6)
LACTATE BLDA-SCNC: 1.5 MMOL/L (ref 0.4–1.6)
MAGNESIUM SERPL-MCNC: 2.3 MG/DL (ref 1.9–2.7)
MAGNESIUM SERPL-MCNC: 2.4 MG/DL (ref 1.9–2.7)
MAGNESIUM SERPL-MCNC: 2.4 MG/DL (ref 1.9–2.7)
MAGNESIUM SERPL-MCNC: 2.6 MG/DL (ref 1.9–2.7)
MCH RBC QN AUTO: 32.1 PG (ref 28–33.2)
MCHC RBC AUTO-ENTMCNC: 35.3 G/DL (ref 32.2–36.5)
MCV RBC AUTO: 90.8 FL (ref 83–98)
P AXIS: 75
PCO2 BLDA: 40 MM HG (ref 35–48)
PCO2 BLDA: 41 MM HG (ref 35–48)
PCO2 BLDA: 41 MM HG (ref 35–48)
PCO2 BLDA: 43 MM HG (ref 35–48)
PCO2 BLDA: 46 MM HG (ref 35–48)
PDW BLD AUTO: 9.7 FL (ref 9.4–12.4)
PH BLDA: 7.33 PH (ref 7.35–7.45)
PH BLDA: 7.36 PH (ref 7.35–7.45)
PH BLDA: 7.37 PH (ref 7.35–7.45)
PH BLDA: 7.38 PH (ref 7.35–7.45)
PH BLDA: 7.4 PH (ref 7.35–7.45)
PHOSPHATE SERPL-MCNC: 3.4 MG/DL (ref 2.4–4.7)
PLATELET # BLD AUTO: 184 K/UL (ref 150–350)
PO2 BLDA: 103 MM HG (ref 83–100)
PO2 BLDA: 114 MM HG (ref 83–100)
PO2 BLDA: 132 MM HG (ref 83–100)
PO2 BLDA: 137 MM HG (ref 83–100)
PO2 BLDA: 84 MM HG (ref 83–100)
POCT PATIENT TEMPERATURE: 98.6 °F (ref 97–99)
POCT TEST (BLD GAS): ABNORMAL
POTASSIUM BLDA-SCNC: 4.3 MEQ/L (ref 3.4–4.5)
POTASSIUM BLDA-SCNC: 4.5 MEQ/L (ref 3.4–4.5)
POTASSIUM BLDA-SCNC: 4.5 MEQ/L (ref 3.4–4.5)
POTASSIUM BLDA-SCNC: 4.6 MEQ/L (ref 3.4–4.5)
POTASSIUM BLDA-SCNC: 4.6 MEQ/L (ref 3.4–4.5)
POTASSIUM SERPL-SCNC: 3.8 MEQ/L (ref 3.5–5.1)
POTASSIUM SERPL-SCNC: 4 MEQ/L (ref 3.5–5.1)
POTASSIUM SERPL-SCNC: 4 MEQ/L (ref 3.5–5.1)
POTASSIUM SERPL-SCNC: 4.3 MEQ/L (ref 3.5–5.1)
POTASSIUM SERPL-SCNC: 4.5 MEQ/L (ref 3.5–5.1)
POTASSIUM SERPL-SCNC: 4.6 MEQ/L (ref 3.5–5.1)
PR INTERVAL: 160
QRS DURATION: 94
QT INTERVAL: 454
QTC CALCULATION(BAZETT): 422
R AXIS: 62
RBC # BLD AUTO: 3.71 M/UL (ref 4.5–5.8)
SAO2 % BLDA: 96 % (ref 93–98)
SAO2 % BLDA: 96 % (ref 93–98)
SAO2 % BLDA: 97 % (ref 93–98)
SAO2 % BLDV: 83 % (ref 30–60)
SODIUM BLDA-SCNC: 136 MEQ/L (ref 136–145)
SODIUM BLDA-SCNC: 136 MEQ/L (ref 136–145)
SODIUM BLDA-SCNC: 137 MEQ/L (ref 136–145)
SODIUM SERPL-SCNC: 138 MEQ/L (ref 136–145)
T WAVE AXIS: 51
VENTRICULAR RATE: 52
WBC # BLD AUTO: 13.78 K/UL (ref 3.8–10.5)

## 2023-07-19 PROCEDURE — 63700000 HC SELF-ADMINISTRABLE DRUG: Performed by: PHYSICIAN ASSISTANT

## 2023-07-19 PROCEDURE — 99233 SBSQ HOSP IP/OBS HIGH 50: CPT | Performed by: ANESTHESIOLOGY

## 2023-07-19 PROCEDURE — 63600000 HC DRUGS/DETAIL CODE: Mod: JZ | Performed by: NURSE PRACTITIONER

## 2023-07-19 PROCEDURE — 80048 BASIC METABOLIC PNL TOTAL CA: CPT | Performed by: PHYSICIAN ASSISTANT

## 2023-07-19 PROCEDURE — 82810 BLOOD GASES O2 SAT ONLY: CPT

## 2023-07-19 PROCEDURE — 84100 ASSAY OF PHOSPHORUS: CPT | Performed by: PHYSICIAN ASSISTANT

## 2023-07-19 PROCEDURE — 71045 X-RAY EXAM CHEST 1 VIEW: CPT

## 2023-07-19 PROCEDURE — 85027 COMPLETE CBC AUTOMATED: CPT | Performed by: PHYSICIAN ASSISTANT

## 2023-07-19 PROCEDURE — 63600000 HC DRUGS/DETAIL CODE: Mod: JZ | Performed by: PHYSICIAN ASSISTANT

## 2023-07-19 PROCEDURE — 36415 COLL VENOUS BLD VENIPUNCTURE: CPT | Performed by: PHYSICIAN ASSISTANT

## 2023-07-19 PROCEDURE — P9045 ALBUMIN (HUMAN), 5%, 250 ML: HCPCS | Mod: JW,JG | Performed by: PHYSICIAN ASSISTANT

## 2023-07-19 PROCEDURE — 84132 ASSAY OF SERUM POTASSIUM: CPT | Performed by: THORACIC SURGERY (CARDIOTHORACIC VASCULAR SURGERY)

## 2023-07-19 PROCEDURE — 21400000 HC ROOM AND CARE CCU/INTERMEDIATE

## 2023-07-19 PROCEDURE — 83735 ASSAY OF MAGNESIUM: CPT | Performed by: PHYSICIAN ASSISTANT

## 2023-07-19 PROCEDURE — 97163 PT EVAL HIGH COMPLEX 45 MIN: CPT | Mod: GP

## 2023-07-19 PROCEDURE — 93010 ELECTROCARDIOGRAM REPORT: CPT | Performed by: INTERNAL MEDICINE

## 2023-07-19 PROCEDURE — 83735 ASSAY OF MAGNESIUM: CPT | Performed by: THORACIC SURGERY (CARDIOTHORACIC VASCULAR SURGERY)

## 2023-07-19 RX ORDER — ENOXAPARIN SODIUM 100 MG/ML
40 INJECTION SUBCUTANEOUS
Status: DISCONTINUED | OUTPATIENT
Start: 2023-07-19 | End: 2023-07-21 | Stop reason: HOSPADM

## 2023-07-19 RX ADMIN — KETOROLAC TROMETHAMINE 30 MG: 30 INJECTION, SOLUTION INTRAMUSCULAR; INTRAVENOUS at 21:09

## 2023-07-19 RX ADMIN — MAGNESIUM OXIDE TAB 400 MG (241.3 MG ELEMENTAL MG) 400 MG: 400 (241.3 MG) TAB at 19:41

## 2023-07-19 RX ADMIN — DOCUSATE SODIUM 100 MG: 100 CAPSULE, LIQUID FILLED ORAL at 08:31

## 2023-07-19 RX ADMIN — ASPIRIN 81 MG: 81 TABLET, COATED ORAL at 08:31

## 2023-07-19 RX ADMIN — FAMOTIDINE 20 MG: 20 TABLET, FILM COATED ORAL at 08:32

## 2023-07-19 RX ADMIN — CLOPIDOGREL BISULFATE 75 MG: 75 TABLET ORAL at 08:32

## 2023-07-19 RX ADMIN — ACETAMINOPHEN 975 MG: 325 TABLET, FILM COATED ORAL at 19:41

## 2023-07-19 RX ADMIN — MAGNESIUM OXIDE TAB 400 MG (241.3 MG ELEMENTAL MG) 400 MG: 400 (241.3 MG) TAB at 08:32

## 2023-07-19 RX ADMIN — ACETAMINOPHEN 975 MG: 325 TABLET, FILM COATED ORAL at 08:32

## 2023-07-19 RX ADMIN — POTASSIUM CHLORIDE 20 MEQ: 750 TABLET, EXTENDED RELEASE ORAL at 15:39

## 2023-07-19 RX ADMIN — METOPROLOL TARTRATE 25 MG: 25 TABLET, FILM COATED ORAL at 19:42

## 2023-07-19 RX ADMIN — MULTIPLE VITAMINS W/ MINERALS TAB 1 TABLET: TAB at 08:31

## 2023-07-19 RX ADMIN — FUROSEMIDE 40 MG: 10 INJECTION, SOLUTION INTRAMUSCULAR; INTRAVENOUS at 10:45

## 2023-07-19 RX ADMIN — BUPIVACAINE HYDROCHLORIDE 20 ML: 2.5 INJECTION, SOLUTION EPIDURAL; INFILTRATION; INTRACAUDAL; PERINEURAL at 00:12

## 2023-07-19 RX ADMIN — METOPROLOL TARTRATE 12.5 MG: 25 TABLET, FILM COATED ORAL at 10:45

## 2023-07-19 RX ADMIN — COLCHICINE 0.6 MG: 0.6 TABLET ORAL at 08:31

## 2023-07-19 RX ADMIN — BUPIVACAINE HYDROCHLORIDE 20 ML: 2.5 INJECTION, SOLUTION EPIDURAL; INFILTRATION; INTRACAUDAL; PERINEURAL at 21:59

## 2023-07-19 RX ADMIN — KETOROLAC TROMETHAMINE 30 MG: 30 INJECTION, SOLUTION INTRAMUSCULAR; INTRAVENOUS at 06:19

## 2023-07-19 RX ADMIN — POTASSIUM CHLORIDE 20 MEQ: 750 TABLET, EXTENDED RELEASE ORAL at 10:45

## 2023-07-19 RX ADMIN — ACETAMINOPHEN 975 MG: 325 TABLET, FILM COATED ORAL at 14:07

## 2023-07-19 RX ADMIN — ENOXAPARIN SODIUM 40 MG: 40 INJECTION SUBCUTANEOUS at 18:07

## 2023-07-19 RX ADMIN — GABAPENTIN 300 MG: 300 CAPSULE ORAL at 08:32

## 2023-07-19 RX ADMIN — GABAPENTIN 300 MG: 300 CAPSULE ORAL at 21:08

## 2023-07-19 RX ADMIN — ALBUMIN (HUMAN) 500 ML: 12.5 SOLUTION INTRAVENOUS at 05:31

## 2023-07-19 RX ADMIN — FUROSEMIDE 40 MG: 10 INJECTION, SOLUTION INTRAMUSCULAR; INTRAVENOUS at 15:37

## 2023-07-19 RX ADMIN — ROSUVASTATIN CALCIUM 20 MG: 20 TABLET, FILM COATED ORAL at 18:07

## 2023-07-19 RX ADMIN — ALBUMIN (HUMAN) 250 ML: 12.5 SOLUTION INTRAVENOUS at 02:23

## 2023-07-19 RX ADMIN — KETOROLAC TROMETHAMINE 30 MG: 30 INJECTION, SOLUTION INTRAMUSCULAR; INTRAVENOUS at 15:38

## 2023-07-19 RX ADMIN — FAMOTIDINE 20 MG: 20 TABLET, FILM COATED ORAL at 19:42

## 2023-07-19 ASSESSMENT — COGNITIVE AND FUNCTIONAL STATUS - GENERAL
MOVING TO AND FROM BED TO CHAIR: 3 - A LITTLE
STANDING UP FROM CHAIR USING ARMS: 3 - A LITTLE
MOVING TO AND FROM BED TO CHAIR: 3 - A LITTLE
CLIMB 3 TO 5 STEPS WITH RAILING: 3 - A LITTLE
STANDING UP FROM CHAIR USING ARMS: 3 - A LITTLE
MOVING TO AND FROM BED TO CHAIR: 3 - A LITTLE
CLIMB 3 TO 5 STEPS WITH RAILING: 3 - A LITTLE
STANDING UP FROM CHAIR USING ARMS: 3 - A LITTLE
WALKING IN HOSPITAL ROOM: 3 - A LITTLE
AFFECT: WFL
CLIMB 3 TO 5 STEPS WITH RAILING: 3 - A LITTLE

## 2023-07-19 NOTE — HOSPITAL COURSE
Chidi is a 62 y.o. male admitted on 7/18/2023 with Coronary artery disease involving native coronary artery of native heart with angina pectoris (CMS/HCC) [I25.119]  Coronary artery disease of native artery of native heart with stable angina pectoris (CMS/HCC) [I25.118]. Principal problem is Coronary artery disease of native artery of native heart with stable angina pectoris (CMS/HCC).    Past Medical History  Chidi has a past medical history of Mixed hyperlipidemia (6/13/2023), Palpitations (06/12/2023), and SOB (shortness of breath).    History of Present Illness   OP CAB MID/THORACOTOMY LIMA, ROBOTIC

## 2023-07-19 NOTE — PROGRESS NOTES
Cardiothoracic Intensivist Progress Note       CARDIOTHORACIC   Post-Operative Day # 1  Subjective     History of Present Illness: Chidi Ramsey is a 62 y.o. male with history of CP and SOB found to have  of LAD.    S/p Robotic LIMA to LAD on 7/18 with Dr. Kaba                             Review of Systems:                                     Constitutional: no acute distress                                                  Eyes: denies difficulty with vision  Ears, nose, mouth, throat, and face: denies oral discomfort                                        Respiratory: denies shortness of breath                                  Cardiovascular: mild chest discomfort at incision site                                  Gastrointestinal: denies abdominal pain                                    Genitourinary: denies difficulty voiding                                      Hematologic: denies bleeding issues                                Musculoskeletal: denies muscle pain                                      Neurological: generalized weakness                                   Integumentary: denies rash    Medical History:   Past Medical History:   Diagnosis Date   • Mixed hyperlipidemia 6/13/2023   • Palpitations 06/12/2023   • SOB (shortness of breath)        Surgical History:   Past Surgical History:   Procedure Laterality Date   • APPENDECTOMY      2019       Prior to Admission medications    Medication Sig Start Date End Date Taking? Authorizing Provider   ascorbic acid/multivit-min (EMERGEN-C ORAL) Take 1 packet by mouth daily.   Yes Provider, Kleber Arciniega MD   aspirin 81 mg enteric coated tablet Take 1 tablet (81 mg total) by mouth daily. 7/6/23 11/3/23 Yes Reina Lion CRNP   cholecalciferol, vitamin D3, 1,000 unit (25 mcg) tablet Take 1,000 Units by mouth daily.   Yes Provider, Kleber Arciniega MD   CRESTOR 20 mg tablet Take 1 tablet (20 mg total) by mouth daily. 7/6/23 11/3/23 Yes Ayad  ANAI Faye   ibuprofen (MOTRIN) 200 mg tablet Take 200 mg by mouth 2 (two) times a day as needed for mild pain.   Yes Provider, Kleber Arciniega MD   mv-min/vit C/glut/lysine/hb124 (IMMUNE SUPPORT ORAL) Take 1 tablet by mouth daily.   Yes Provider, Kleber Arciniega MD   vitamin B complex (B COMPLEX ORAL) Take 1 tablet by mouth daily.   Yes ProviderKleber MD   acetylcysteine (NAC ORAL) Take 1,000 mg by mouth daily.    Provider, Kleber Arciniega MD       Allergies: Augmentin [amoxicillin-pot clavulanate]    Social History:   Social History     Socioeconomic History   • Marital status:      Spouse name: None   • Number of children: None   • Years of education: None   • Highest education level: None   Tobacco Use   • Smoking status: Never   • Smokeless tobacco: Never   Vaping Use   • Vaping Use: Never used   Substance and Sexual Activity   • Alcohol use: Yes     Comment: socially   • Drug use: Never   • Sexual activity: Defer     Social Determinants of Health     Financial Resource Strain: Low Risk  (7/18/2023)    Overall Financial Resource Strain (CARDIA)    • Difficulty of Paying Living Expenses: Not hard at all   Transportation Needs: No Transportation Needs (7/18/2023)    PRAPARE - Transportation    • Lack of Transportation (Medical): No    • Lack of Transportation (Non-Medical): No   Housing Stability: Unknown (7/18/2023)    Housing Stability Vital Sign    • Unable to Pay for Housing in the Last Year: No    • Unstable Housing in the Last Year: No       Family History:   Family History   Problem Relation Age of Onset   • Multiple sclerosis Biological Mother    • Hyperlipidemia Biological Father    • Hypertension Biological Father    • Diabetes Biological Father    • No Known Problems Biological Sister    • No Known Problems Biological Sister    • No Known Problems Biological Sister    • No Known Problems Biological Sister    • No Known Problems Biological Brother        Objective     Vital signs  in last 24 hours:  Temp:  [36.4 °C (97.5 °F)] 36.4 °C (97.5 °F)  Heart Rate:  [49-99] 64  Resp:  [10-36] 18  BP: (105-140)/(61-68) 115/63      Intake/Output Summary (Last 24 hours) at 7/19/2023 1134  Last data filed at 7/19/2023 1100  Gross per 24 hour   Intake 7852.67 ml   Output 5980 ml   Net 1872.67 ml     Intake/Output this shift:  I/O this shift:  In: 153.8 [I.V.:153.8]  Out: 395 [Urine:345; Chest Tube:50]    Labs  Lab Results   Component Value Date    WBC 13.78 (H) 07/19/2023    HGB 11.9 (L) 07/19/2023    HCT 33.7 (L) 07/19/2023     07/19/2023    CHOL 184 06/16/2023    TRIG 177 (H) 06/16/2023    HDL 50 06/16/2023    ALT 17 07/06/2023    AST 21 07/06/2023     07/19/2023    K 4.3 07/19/2023     07/19/2023    CREATININE 0.8 07/19/2023    BUN 13 07/19/2023    CO2 24 07/19/2023    MG 2.4 07/19/2023    PHOS 3.4 07/19/2023    TSH 2.85 07/06/2023    INR 1.3 07/18/2023    HGBA1C 5.3 06/16/2023    PT 16.0 (H) 07/18/2023    PTT 28 07/18/2023       Cardiac Imaging    TRANSTHORACIC ECHO (TTE) COMPLETE 07/06/2023    Interpretation Summary  The left ventricular cavity size is normal with normal wall thickness and preserved systolic function. EF 55% by visual estimate. Difficult to assess regional wall motions despite use of Definity-Grossly normal wall motion. Normal diastolic function for age.    Aortic valve cusps poorly visualized. The visualized portions of the aortic root and ascending aorta are normal in size.  No aortic coarctation.  No aortic insufficiency or stenosis.    The mitral valve is normal. Trace mitral regurgitation.    The left atrium is normal in size.    Limited visualization but the right ventricle appears normal size with preserved systolic function.    The pulmonic valve is not well seen.    The tricuspid is normal in appearance. Trace tricuspid regurgitation with insufficient jet to estimate RVSP.    Right atrium is not well seen.    The IVC is small and collapses > 50% with  inspiration consistent with normal right atrial pressures.    No pericardial effusion.    No prior study for comparison.        Full Code    Head/Ear/Nose/Throat:     NCAT.                               Eyes:     EOMI and PERRL.                     Respiratory:     diminished at the bases b/l.               Cardiovascular:     RRR.              Gastrointestinal:     non-tender and non-distended.                 Genitourinary:     levi in place.                    Extremities:     trace edema b/l lower extremities.            Musculoskeletal:      No injury or deformity.                   Neurological:     follows commands.       Behavior/Emotional:     appropriate and cooperative.                           Lymph:      No adenopathy noted.                               Skin:      Clean, dry and intact.     Examination of the patient performed at the bedside. Rounded with ICU team and discussed management/plan with surgical staff. Medications, radiological studies and labs reviewed and discussed with attending of record, Dr. Yvan Kaba.    Cardiothoracic Assessment and Plan:    Neuro: pain moderately controlled.  Continue pain meds prn    CV: CAD s/p robotic LIMA to LAD.  Doing well post op.  Keep MAP>65 and CI>2.   ASA/plavix and statin    Pul- doing well on NC.  IS    GI- ADAT.  pepcid    Heme- trend CT output.  Lovenox for dvt proph    ID- completed periop abx per protocol    Disposition: Guarded. Severe CAD s/p major, invasive cardiac surgery. OK for stepdown unit.             Aislinn Sheth MD  7/19/2023

## 2023-07-19 NOTE — PROGRESS NOTES
Spoke with patient and confirmed with medication list from [facility] and/or patient's own pharmacy to complete the medication reconciliation.     Prior to admission medication list:    Medications Prior to Admission:   •  ascorbic acid/multivit-min (EMERGEN-C ORAL), Take 1 packet by mouth daily.  •  aspirin 81 mg enteric coated tablet, Take 81 mg by mouth daily.  •  cholecalciferol, vitamin D3, 1,000 unit (25 mcg) tablet, Take 1,000 Units by mouth daily.  •  ibuprofen (MOTRIN) 200 mg tablet, Take 200 mg by mouth 2 (two) times a day as needed for mild pain.  •  mv-min/vit C/glut/lysine/hb124 (IMMUNE SUPPORT ORAL), Take 1 tablet by mouth daily.  •  rosuvastatin (CRESTOR) 20 mg tablet, Take 20 mg by mouth daily.  •  vitamin B complex (B COMPLEX ORAL), Take 1 tablet by mouth daily.  •  acetylcysteine (NAC ORAL), Take 1,000 mg by mouth daily.    Comments about home medications:  - pt had poison ivy and was prescribed a prednisone taper on 7/10/23. However, he only took 40 mg x4 days and does not require it anymore.    Compliance:   - Consistent fills, no compliance concerns based on patient interview.

## 2023-07-19 NOTE — PROGRESS NOTES
Physical Therapy -  Initial Evaluation     Patient: Chidi Ramsey  Location: [address] 2018  MRN: [id number]  Today's date: 7/19/2023    HISTORY OF PRESENT ILLNESS     Chidi is a 62 y.o. male admitted on 7/18/2023 with Coronary artery disease involving native coronary artery of native heart with angina pectoris (CMS/HCC) [I25.119]  Coronary artery disease of native artery of native heart with stable angina pectoris (CMS/HCC) [I25.118]. Principal problem is Coronary artery disease of native artery of native heart with stable angina pectoris (CMS/HCC).    Past Medical History  Chidi has a past medical history of Mixed hyperlipidemia (6/13/2023), Palpitations (06/12/2023), and SOB (shortness of breath).    History of Present Illness   OP CAB MID/THORACOTOMY ELENA, ROBOTIC    PRIOR LEVEL OF FUNCTION AND LIVING ENVIRONMENT     Prior Level of Function    Flowsheet Row Most Recent Value   Dominant Hand right   Ambulation independent   Transferring independent   Toileting independent   Bathing independent   Dressing independent   Eating independent   IADLs independent   Communication understands/communicates without difficulty   Assistive Device Currently Used at Home none        Prior Living Environment    Flowsheet Row Most Recent Value   People in Home spouse   Current Living Arrangements home   Home Accessibility stairs to enter home (Group), stairs within home (Group)   Living Environment Comment pt lives with wife and daughter in 2SH, 3STE, bed and bath upstairs with stall shower        VITALS AND PAIN     PT Vitals    Date/Time Pulse HR Source SpO2 Pt Activity O2 Therapy O2 Del Method O2 Flow Rate BP Pt Position [facility]   07/19/23 0805 68 Monitor 98 % At rest Supplemental oxygen Nasal cannula 2 L/min 140/68 Sitting BMN      PT Pain    Date/Time Pain Type Rating: Rest Rating: Activity [facility]   07/19/23 0805 Pain Assessment 0 0 BMN        Objective   OBJECTIVE     Start  time:  0805  End time:  0820  Session Length: 15 min  Mode of Treatment: physical therapy    General Observations  Patient received upright, in chair. He was no issues or concerns identified by nurse prior to session, agreeable to therapy.      Precautions: fall, cardiac, oxygen therapy device and L/min              PT Eval and Treat - 07/19/23 0805        Cognition    Orientation Status oriented x 4     Affect/Mental Status WFL        Sensory Assessment    Sensory Assessment sensation intact, lower extremities        Lower Extremity Assessment    LE Assessment ROM and Strength WFL        Bed Mobility    Bed Mobility Activities sit to supine     [place] supervision     Safety/Cues hand placement     Assistive Device bed rails;head of bed elevated     Comment pt received OOB, supervision to return to supine        Mobility Belt    Mobility Belt Used for All Out of Bed Activity no     Reason Mobility Belt Not Used medical contraindication     Reason Mobility Belt Not Used chest tube        Sit/Stand Transfer    Surface chair with arm rests     [place] supervision     Safety/Cues hand placement     Assistive Device walker, front-wheeled     Transfer Comments from recliner        Gait Training    [place], Gait supervision     Safety/Cues hand placement     Assistive Device walker, front-wheeled     Distance in Feet 185 feet     Pattern step-through     Deviations/Abnormal Patterns gait speed decreased;step length decreased     Comment (Gait/Stairs) slow but steady gait pattern with use of RW, no overt LOB, VSS throughout        Stairs Training    [place], Stairs unable to assess        Balance    Static Sitting Balance WFL     Dynamic Sitting Balance WFL     Sit to Stand Dynamic Balance WFL     Static Standing Balance WFL     Dynamic Standing Balance WFL                               Education Documentation  Home Safety, taught by Jesus Mathis, PT at 7/19/2023 11:45 AM.  Learner:  Patient  Readiness: Acceptance  Method: Explanation  Response: Needs Reinforcement  Comment: safety with mobility        Session Outcome  Patient reclined, in bed at end of session, bed alarm on, all needs met, call light in reach, personal items in reach. Nursing notified about patient's performance, patient's position, and patient's response to therapy/activity.    AM-PAC™ - Mobility (Current Function)     Turning form your back to your side while in flat bed without using bedrails 3 - A Little   Moving from lying on your back to sitting on the side of a flat bed without using bedrails 3 - A Little   Moving to and from a bed to a chair 3 - A Little   Standing up from a chair using your arms 3 - A Little   To walk in a hospital room 3 - A Little   Climbing 3-5 steps with a railing 3 - A Little   AM-PAC™ Mobility Score 18      ASSESSMENT AND PLAN     Progress Summary  pt requires supevision with all mobility, anticipate quick improvement. Rec home with assist    Patient/Family Therapy Goals Statement: return to PLOF          PT Discharge Recommendations    Flowsheet Row Most Recent Value   PT Recommended Discharge Disposition home with assistance at 07/19/2023 0805   Anticipated Equipment Needs at Discharge (PT) none at 07/19/2023 0805               PT Goals    Flowsheet Row Most Recent Value   Bed Mobility Goal 1    Activity/Assistive Device bed mobility activities, all at 07/19/2023 0805   [place] independent at 07/19/2023 0805   Time Frame by discharge at 07/19/2023 0805   Progress/Outcome goal ongoing at 07/19/2023 0805   Transfer Goal 1    Activity/Assistive Device all transfers at 07/19/2023 0805   [place] independent at 07/19/2023 0805   Time Frame by discharge at 07/19/2023 0805   Progress/Outcome goal ongoing at 07/19/2023 0805   Gait Training Goal 1    Activity/Assistive Device gait (walking locomotion) at 07/19/2023 0805   [place] independent at 07/19/2023 0805   Distance 250 feet at  07/19/2023 0805   Time Frame by discharge at 07/19/2023 0805   Progress/Outcome goal ongoing at 07/19/2023 0805   Stairs Goal 1    Activity/Assistive Device stairs, all skills at 07/19/2023 0805   [place] independent at 07/19/2023 0805   Number of Stairs 12 at 07/19/2023 0805   Time Frame by discharge at 07/19/2023 0805   Progress/Outcome goal ongoing at 07/19/2023 0805

## 2023-07-19 NOTE — CONSULTS
"Brief Nutrition Note    Recommendations     Regular diet       Clinical Course: Patient is a 62 y.o. male who was admitted on 7/18/2023 with a diagnosis of Coronary artery disease involving native coronary artery of native heart with angina pectoris (CMS/HCC) [I25.119]  Coronary artery disease of native artery of native heart with stable angina pectoris (CMS/HCC) [I25.118].     Past Medical History:   Diagnosis Date   • Mixed hyperlipidemia 6/13/2023   • Palpitations 06/12/2023   • SOB (shortness of breath)      Past Surgical History:   Procedure Laterality Date   • APPENDECTOMY      2019       Reason for Assessment  Reason For Assessment: physician consult  Diagnosis: cardiac disease     [facility] Nutrition Screen Tool  Has patient lost weight without trying?: 0-->No  Has patient been eating poorly due to decreased appetite?: 0-->No  [facility] Nutrition Screen Score: 0     Nutrition/Diet History  Diet Prior to Admission: REGULAR  Appetite Prior to Admission: Jphfadxor-%    Physical Findings  Skin: surgical incision, edema (trace SHARA)     RETS18 Physical Appearance  Skin: surgical incision, edema (trace SHARA)     Nutrition Order  Nutrition Order: meets nutritional requirements  Nutrition Order Comments: Regular     Anthropometrics  Height: 177.8 cm (5' 10\")           Current Weight  Weight Method: Standing scale  Weight: 89.6 kg (197 lb 8.5 oz)     Ideal Body Weight (IBW)  Ideal Body Weight (IBW) (kg): 76.48  % Ideal Body Weight: 112.69            Body Mass Index (BMI)  BMI (Calculated): 28.3     Labs/Procedures/Meds  Lab Results Reviewed: reviewed, pertinent   BMP Results       07/19/23 07/19/23 07/18/23     0537 0122 2113    NA -- 138 --    K 4.5 4.6 4.3    Cl -- 107 --    CO2 -- 24 --    Glucose -- 145 --    BUN -- 13 --    Creatinine -- 0.8 --    Calcium -- 8.6 --    Anion Gap -- 7 --    EGFR -- >60.0 --        Lab Results   Component Value Date    HGBA1C 5.3 06/16/2023   Mg-2.4, Phos-3.4        "   Medications  Pertinent Medications Reviewed: reviewed, pertinent   • acetaminophen  975 mg oral q6h   • aspirin  81 mg oral Daily   • clopidogreL  75 mg oral Daily   • colchicine  0.6 mg oral Daily   • docusate sodium  100 mg oral BID   • famotidine  20 mg oral BID   • furosemide  40 mg intravenous BID (am, 4p)    And   • potassium chloride  20 mEq oral BID (am, 4p)   • [START ON 7/22/2023] furosemide  40 mg intravenous Daily    And   • [START ON 7/22/2023] potassium chloride  20 mEq oral Daily   • gabapentin  300 mg oral q12h WANDER    Followed by   • [START ON 7/23/2023] gabapentin  300 mg oral Daily   • magnesium oxide  400 mg oral BID   • metoprolol tartrate  12.5 mg oral BID    Or   • metoprolol tartrate  25 mg oral BID   • multivitamin  1 tablet oral Daily   • polyethylene glycol  17 g oral Daily   • rosuvastatin  20 mg oral Daily (6p)   • senna  1 tablet oral BID           Skin: surgical site, +1 SHARA    #    Clinical comments: POD #1 CABG. Tolerating diet. Admits that he needs to improve his food choices at home. Discussed a cardiac diet for home and attached diet materials. Good compliance expected.     Goals: Will eat % of meals  Monitor: appetite     Recommendations: See above       Date: 07/19/23  Signature: Alejandrina Guerrero RD

## 2023-07-19 NOTE — PLAN OF CARE
Care Coordination Discharge Plan Note     Discharge Needs Assessment  Concerns to be Addressed: care coordination/care conferences, discharge planning  Current Discharge Risk:      Anticipated Discharge Plan  Anticipated Discharge Disposition: home with home health  Type of Home Care Services: nursing        Patient Choice  Offered/Gave Vendor List: yes  Patient's Choice of Community Agency(s): [facility]    Patient and/or patient guardian/advocate was made aware of their right to choose a provider. A list of eligible providers was presented and reviewed with the patient and/or patient guardian/advocate in written and/or verbal form. The list delineates providers in the patient’s desired geographic area who are participating in the Medicare program and/or providers contracted with the patient’s primary insurance. The Medicare list and quality ratings were obtained from the Medicare.gov [medicare.gov] website.    ---------------------------------------------------------------------------------------------------------------------    Interdisciplinary Discharge Plan Review:  Participants:advanced practice provider, , physical therapy, physician, nursing, social work/services    Concerns Comments: Per info received at surgical rounds carlene, pt is POD #1 s/p robotic CABG. Plan for transfer to CT SDU today. Anticipating d/c home with [facility] Phone: [phone]  when medically stable. Will continue to follow until d/c completed    Discharge Plan:   Disposition/Destination: Home Health Care - Other / Home  Discharge Facility:    Community Resources:      Discharge Transportation:  Is Out of Hospital DNR needed at Discharge: no  Does patient need discharge transport? No

## 2023-07-19 NOTE — PLAN OF CARE
Problem: Adult Inpatient Plan of Care  Goal: Plan of Care Review  Outcome: Progressing  Flowsheets (Taken 7/19/2023 1144)  Progress: improving  Outcome Evaluation: PT eval completed, Rec home with assist  Plan of Care Reviewed With: patient     Problem: Mobility Impairment  Goal: Optimal Mobility  Outcome: Progressing

## 2023-07-19 NOTE — PLAN OF CARE
Problem: Adult Inpatient Plan of Care  Goal: Plan of Care Review  Outcome: Progressing  Flowsheets (Taken 7/19/2023 9116)  Progress: improving  Outcome Evaluation: Pt pod#1 walked three times with minimal assistance, d/c'd lower chest tube bulbed upper chest tube, dc'd radial juan and levi. No gtts. Pain management plan reviewed with patient. Transfer to SDU.  Plan of Care Reviewed With: patient  Goal: Patient-Specific Goal (Individualized)  Outcome: Progressing  Goal: Absence of Hospital-Acquired Illness or Injury  Outcome: Progressing  Goal: Optimal Comfort and Wellbeing  Outcome: Progressing  Goal: Readiness for Transition of Care  Outcome: Progressing     Problem: Skin Injury Risk Increased  Goal: Skin Health and Integrity  Outcome: Progressing     Problem: Fall Injury Risk  Goal: Absence of Fall and Fall-Related Injury  Outcome: Progressing     Problem: Mobility Impairment  Goal: Optimal Mobility  Outcome: Progressing   Plan of Care Review  Plan of Care Reviewed With: patient  Progress: improving  Outcome Evaluation: Pt pod#1 walked three times with minimal assistance, d/c'd lower chest tube bulbed upper chest tube, dc'd radial juan and levi. No gtts. Pain management plan reviewed with patient. Transfer to SDU.

## 2023-07-20 ENCOUNTER — APPOINTMENT (INPATIENT)
Dept: RADIOLOGY | Facility: HOSPITAL | Age: 63
DRG: 236 | End: 2023-07-20
Payer: COMMERCIAL

## 2023-07-20 LAB
ANION GAP SERPL CALC-SCNC: 5 MEQ/L (ref 3–15)
BUN SERPL-MCNC: 22 MG/DL (ref 7–25)
CA-I BLD-SCNC: 1.14 MMOL/L (ref 1.15–1.27)
CALCIUM SERPL-MCNC: 8.5 MG/DL (ref 8.6–10.3)
CHLORIDE SERPL-SCNC: 103 MEQ/L (ref 98–107)
CO2 SERPL-SCNC: 31 MEQ/L (ref 21–31)
CREAT SERPL-MCNC: 0.8 MG/DL (ref 0.7–1.3)
ERYTHROCYTE [DISTWIDTH] IN BLOOD BY AUTOMATED COUNT: 12.5 % (ref 11.6–14.4)
GFR SERPL CREATININE-BSD FRML MDRD: >60 ML/MIN/1.73M*2
GLUCOSE SERPL-MCNC: 120 MG/DL (ref 70–99)
HCT VFR BLDCO AUTO: 33.7 % (ref 40.1–51)
HGB BLD-MCNC: 11.6 G/DL (ref 13.7–17.5)
MAGNESIUM SERPL-MCNC: 2 MG/DL (ref 1.9–2.7)
MCH RBC QN AUTO: 32.2 PG (ref 28–33.2)
MCHC RBC AUTO-ENTMCNC: 34.4 G/DL (ref 32.2–36.5)
MCV RBC AUTO: 93.6 FL (ref 83–98)
PDW BLD AUTO: 9.8 FL (ref 9.4–12.4)
PHOSPHATE SERPL-MCNC: 2.3 MG/DL (ref 2.4–4.7)
PLATELET # BLD AUTO: 201 K/UL (ref 150–350)
POTASSIUM SERPL-SCNC: 3.9 MEQ/L (ref 3.5–5.1)
RBC # BLD AUTO: 3.6 M/UL (ref 4.5–5.8)
SODIUM SERPL-SCNC: 139 MEQ/L (ref 136–145)
WBC # BLD AUTO: 14.73 K/UL (ref 3.8–10.5)

## 2023-07-20 PROCEDURE — 63700000 HC SELF-ADMINISTRABLE DRUG: Performed by: NURSE PRACTITIONER

## 2023-07-20 PROCEDURE — 63600000 HC DRUGS/DETAIL CODE: Mod: JZ | Performed by: PHYSICIAN ASSISTANT

## 2023-07-20 PROCEDURE — 63700000 HC SELF-ADMINISTRABLE DRUG: Performed by: PHYSICIAN ASSISTANT

## 2023-07-20 PROCEDURE — 36415 COLL VENOUS BLD VENIPUNCTURE: CPT

## 2023-07-20 PROCEDURE — 83735 ASSAY OF MAGNESIUM: CPT

## 2023-07-20 PROCEDURE — 85027 COMPLETE CBC AUTOMATED: CPT

## 2023-07-20 PROCEDURE — 71045 X-RAY EXAM CHEST 1 VIEW: CPT

## 2023-07-20 PROCEDURE — 25000000 HC PHARMACY GENERAL: Performed by: NURSE PRACTITIONER

## 2023-07-20 PROCEDURE — 25000000 HC PHARMACY GENERAL: Performed by: PHYSICIAN ASSISTANT

## 2023-07-20 PROCEDURE — 80048 BASIC METABOLIC PNL TOTAL CA: CPT

## 2023-07-20 PROCEDURE — 99232 SBSQ HOSP IP/OBS MODERATE 35: CPT | Performed by: ANESTHESIOLOGY

## 2023-07-20 PROCEDURE — 25800000 HC PHARMACY IV SOLUTIONS: Performed by: NURSE PRACTITIONER

## 2023-07-20 PROCEDURE — 21400000 HC ROOM AND CARE CCU/INTERMEDIATE

## 2023-07-20 PROCEDURE — 25800000 HC PHARMACY IV SOLUTIONS: Performed by: PHYSICIAN ASSISTANT

## 2023-07-20 PROCEDURE — 84100 ASSAY OF PHOSPHORUS: CPT

## 2023-07-20 PROCEDURE — 63600000 HC DRUGS/DETAIL CODE: Mod: JZ | Performed by: NURSE PRACTITIONER

## 2023-07-20 PROCEDURE — 82330 ASSAY OF CALCIUM: CPT | Performed by: PHYSICIAN ASSISTANT

## 2023-07-20 RX ORDER — ADHESIVE BANDAGE
30 BANDAGE TOPICAL DAILY
Status: DISCONTINUED | OUTPATIENT
Start: 2023-07-20 | End: 2023-07-21 | Stop reason: HOSPADM

## 2023-07-20 RX ORDER — POTASSIUM CHLORIDE 750 MG/1
20 TABLET, EXTENDED RELEASE ORAL DAILY
Status: DISCONTINUED | OUTPATIENT
Start: 2023-07-21 | End: 2023-07-21 | Stop reason: HOSPADM

## 2023-07-20 RX ORDER — FUROSEMIDE 10 MG/ML
40 INJECTION INTRAMUSCULAR; INTRAVENOUS EVERY 12 HOURS
Status: DISCONTINUED | OUTPATIENT
Start: 2023-07-20 | End: 2023-07-20

## 2023-07-20 RX ORDER — KETOROLAC TROMETHAMINE 30 MG/ML
30 INJECTION, SOLUTION INTRAMUSCULAR; INTRAVENOUS EVERY 6 HOURS PRN
Status: DISCONTINUED | OUTPATIENT
Start: 2023-07-20 | End: 2023-07-21 | Stop reason: HOSPADM

## 2023-07-20 RX ORDER — POTASSIUM CHLORIDE 750 MG/1
20 TABLET, EXTENDED RELEASE ORAL DAILY
Status: DISCONTINUED | OUTPATIENT
Start: 2023-07-22 | End: 2023-07-20

## 2023-07-20 RX ORDER — FUROSEMIDE 10 MG/ML
40 INJECTION INTRAMUSCULAR; INTRAVENOUS
Status: DISCONTINUED | OUTPATIENT
Start: 2023-07-20 | End: 2023-07-21

## 2023-07-20 RX ORDER — POTASSIUM CHLORIDE 750 MG/1
20 TABLET, EXTENDED RELEASE ORAL DAILY
Status: DISCONTINUED | OUTPATIENT
Start: 2023-07-20 | End: 2023-07-20

## 2023-07-20 RX ADMIN — POTASSIUM CHLORIDE 20 MEQ: 750 TABLET, EXTENDED RELEASE ORAL at 08:52

## 2023-07-20 RX ADMIN — MAGNESIUM OXIDE TAB 400 MG (241.3 MG ELEMENTAL MG) 400 MG: 400 (241.3 MG) TAB at 19:45

## 2023-07-20 RX ADMIN — DOCUSATE SODIUM 100 MG: 100 CAPSULE, LIQUID FILLED ORAL at 08:51

## 2023-07-20 RX ADMIN — SENNOSIDES 1 TABLET: 8.6 TABLET, FILM COATED ORAL at 00:22

## 2023-07-20 RX ADMIN — FAMOTIDINE 20 MG: 20 TABLET, FILM COATED ORAL at 08:52

## 2023-07-20 RX ADMIN — ASPIRIN 81 MG: 81 TABLET, COATED ORAL at 08:51

## 2023-07-20 RX ADMIN — ROSUVASTATIN CALCIUM 20 MG: 20 TABLET, FILM COATED ORAL at 17:05

## 2023-07-20 RX ADMIN — GABAPENTIN 300 MG: 300 CAPSULE ORAL at 21:05

## 2023-07-20 RX ADMIN — MAGNESIUM SULFATE HEPTAHYDRATE 2 G: 40 INJECTION, SOLUTION INTRAVENOUS at 04:26

## 2023-07-20 RX ADMIN — MAGNESIUM OXIDE TAB 400 MG (241.3 MG ELEMENTAL MG) 400 MG: 400 (241.3 MG) TAB at 08:52

## 2023-07-20 RX ADMIN — ENOXAPARIN SODIUM 40 MG: 40 INJECTION SUBCUTANEOUS at 17:05

## 2023-07-20 RX ADMIN — FAMOTIDINE 20 MG: 20 TABLET, FILM COATED ORAL at 19:45

## 2023-07-20 RX ADMIN — GABAPENTIN 300 MG: 300 CAPSULE ORAL at 08:51

## 2023-07-20 RX ADMIN — FUROSEMIDE 40 MG: 10 INJECTION, SOLUTION INTRAMUSCULAR; INTRAVENOUS at 08:52

## 2023-07-20 RX ADMIN — KETOROLAC TROMETHAMINE 30 MG: 30 INJECTION, SOLUTION INTRAMUSCULAR; INTRAVENOUS at 11:55

## 2023-07-20 RX ADMIN — CLOPIDOGREL BISULFATE 75 MG: 75 TABLET ORAL at 08:52

## 2023-07-20 RX ADMIN — MAGNESIUM HYDROXIDE 30 ML: 400 SUSPENSION ORAL at 08:53

## 2023-07-20 RX ADMIN — ACETAMINOPHEN 975 MG: 325 TABLET, FILM COATED ORAL at 08:50

## 2023-07-20 RX ADMIN — POTASSIUM PHOSPHATE, MONOBASIC AND POTASSIUM PHOSPHATE, DIBASIC 20 MMOL: 224; 236 INJECTION, SOLUTION, CONCENTRATE INTRAVENOUS at 06:18

## 2023-07-20 RX ADMIN — SENNOSIDES 1 TABLET: 8.6 TABLET, FILM COATED ORAL at 08:51

## 2023-07-20 RX ADMIN — KETOROLAC TROMETHAMINE 30 MG: 30 INJECTION, SOLUTION INTRAMUSCULAR; INTRAVENOUS at 17:04

## 2023-07-20 RX ADMIN — MULTIPLE VITAMINS W/ MINERALS TAB 1 TABLET: TAB at 08:50

## 2023-07-20 RX ADMIN — COLCHICINE 0.6 MG: 0.6 TABLET ORAL at 08:52

## 2023-07-20 RX ADMIN — OXYCODONE HYDROCHLORIDE 5 MG: 5 TABLET ORAL at 00:22

## 2023-07-20 RX ADMIN — CALCIUM CHLORIDE 1 G: 100 INJECTION, SOLUTION INTRAVENOUS at 05:16

## 2023-07-20 RX ADMIN — METOPROLOL TARTRATE 12.5 MG: 25 TABLET, FILM COATED ORAL at 08:51

## 2023-07-20 RX ADMIN — POTASSIUM CHLORIDE 20 MEQ: 14.9 INJECTION, SOLUTION INTRAVENOUS at 04:26

## 2023-07-20 RX ADMIN — ACETAMINOPHEN 975 MG: 325 TABLET, FILM COATED ORAL at 19:44

## 2023-07-20 RX ADMIN — ACETAMINOPHEN 975 MG: 325 TABLET, FILM COATED ORAL at 13:22

## 2023-07-20 ASSESSMENT — COGNITIVE AND FUNCTIONAL STATUS - GENERAL
STANDING UP FROM CHAIR USING ARMS: 3 - A LITTLE
CLIMB 3 TO 5 STEPS WITH RAILING: 3 - A LITTLE
CLIMB 3 TO 5 STEPS WITH RAILING: 3 - A LITTLE
WALKING IN HOSPITAL ROOM: 3 - A LITTLE
MOVING TO AND FROM BED TO CHAIR: 3 - A LITTLE
MOVING TO AND FROM BED TO CHAIR: 3 - A LITTLE
WALKING IN HOSPITAL ROOM: 3 - A LITTLE
STANDING UP FROM CHAIR USING ARMS: 3 - A LITTLE

## 2023-07-20 NOTE — PLAN OF CARE
Care Coordination Discharge Plan Note     Discharge Needs Assessment  Concerns to be Addressed: care coordination/care conferences, discharge planning  Current Discharge Risk:      Anticipated Discharge Plan  Anticipated Discharge Disposition: home with home health  Type of Home Care Services: nursing        Patient Choice  Offered/Gave Vendor List: yes  Patient's Choice of Community Agency(s): [facility]    Patient and/or patient guardian/advocate was made aware of their right to choose a provider. A list of eligible providers was presented and reviewed with the patient and/or patient guardian/advocate in written and/or verbal form. The list delineates providers in the patient’s desired geographic area who are participating in the Medicare program and/or providers contracted with the patient’s primary insurance. The Medicare list and quality ratings were obtained from the Medicare.gov [medicare.gov] website.    ---------------------------------------------------------------------------------------------------------------------    Interdisciplinary Discharge Plan Review:  Participants:advanced practice provider, , physical therapy, physician, nursing, social work/services    Concerns Comments: Per info received at surgical rounds carlene, pt is POD #2 s/p robotic CABG. Continue diuresing and plan for d/c CT pending chest tube output. Anticipating d/c home with [facility] Phone: [phone] when medically stable. Updated clinicals faxed to [facility] per request. Will continue to follow until d/c completed    Discharge Plan:   Disposition/Destination: Home Health Care - Other / Home  Discharge Facility:    Community Resources:      Discharge Transportation:  Is Out of Hospital DNR needed at Discharge: no  Does patient need discharge transport? No

## 2023-07-20 NOTE — PROGRESS NOTES
Cardiothoracic Intensivist Progress Note       CARDIOTHORACIC   IV dye allergy: no    Post-Operative Day # 2     Subjective     History of Present Illness: Chidi Ramsey is a 62 y.o. cisgender male with history of known coronary artery disease prior to OR s/p CABG, chronic hyperlipidemia that is being treated.                              Review of Systems:                                     Constitutional: no acute distress                                                  Eyes: denies difficulty with vision  Ears, nose, mouth, throat, and face: denies oral discomfort                                        Respiratory: denies shortness of breath                                  Cardiovascular: mild chest discomfort at incision site                                  Gastrointestinal: denies abdominal pain                                    Genitourinary: denies difficulty voiding                                      Hematologic: denies bleeding issues                                Musculoskeletal: denies muscle pain                                      Neurological: mild weakness                                   Integumentary: denies rash    Medical History:   Past Medical History:   Diagnosis Date   • Mixed hyperlipidemia 6/13/2023   • Palpitations 06/12/2023   • SOB (shortness of breath)        Surgical History:   Past Surgical History:   Procedure Laterality Date   • APPENDECTOMY      2019       Prior to Admission medications    Medication Sig Start Date End Date Taking? Authorizing Provider   ascorbic acid/multivit-min (EMERGEN-C ORAL) Take 1 packet by mouth daily.   Yes Provider, Kleber Arciniega MD   aspirin 81 mg enteric coated tablet Take 81 mg by mouth daily.   Yes ProviderKleber MD   cholecalciferol, vitamin D3, 1,000 unit (25 mcg) tablet Take 1,000 Units by mouth daily.   Yes ProviderKleber MD   ibuprofen (MOTRIN) 200 mg tablet Take 200 mg by mouth 2 (two) times a day as  needed for mild pain.   Yes Provider, Kleber Arciniega MD   mv-min/vit C/glut/lysine/hb124 (IMMUNE SUPPORT ORAL) Take 1 tablet by mouth daily.   Yes ProviderKleber MD   rosuvastatin (CRESTOR) 20 mg tablet Take 20 mg by mouth daily.   Yes ProviderKleber MD   vitamin B complex (B COMPLEX ORAL) Take 1 tablet by mouth daily.   Yes ProviderKleber MD   acetylcysteine (NAC ORAL) Take 1,000 mg by mouth daily.    Provider, Kleber Arciniega MD       Allergies: Augmentin [amoxicillin-pot clavulanate]    Social History:   Social History     Socioeconomic History   • Marital status:      Spouse name: None   • Number of children: None   • Years of education: None   • Highest education level: None   Tobacco Use   • Smoking status: Never   • Smokeless tobacco: Never   Vaping Use   • Vaping Use: Never used   Substance and Sexual Activity   • Alcohol use: Yes     Comment: socially   • Drug use: Never   • Sexual activity: Defer     Social Determinants of Health     Financial Resource Strain: Low Risk  (7/18/2023)    Overall Financial Resource Strain (CARDIA)    • Difficulty of Paying Living Expenses: Not hard at all   Transportation Needs: No Transportation Needs (7/18/2023)    PRAPARE - Transportation    • Lack of Transportation (Medical): No    • Lack of Transportation (Non-Medical): No   Housing Stability: Unknown (7/18/2023)    Housing Stability Vital Sign    • Unable to Pay for Housing in the Last Year: No    • Unstable Housing in the Last Year: No       Family History:   Family History   Problem Relation Age of Onset   • Multiple sclerosis Biological Mother    • Hyperlipidemia Biological Father    • Hypertension Biological Father    • Diabetes Biological Father    • No Known Problems Biological Sister    • No Known Problems Biological Sister    • No Known Problems Biological Sister    • No Known Problems Biological Sister    • No Known Problems Biological Brother        Objective     Vital  signs in last 24 hours:  Temp:  [36.2 °C (97.2 °F)-37.4 °C (99.3 °F)] 36.9 °C (98.5 °F)  Heart Rate:  [60-80] 78  Resp:  [16-20] 18  BP: (102-139)/(57-82) 102/57      Intake/Output Summary (Last 24 hours) at 7/20/2023 1040  Last data filed at 7/20/2023 1022  Gross per 24 hour   Intake 350 ml   Output 2790 ml   Net -2440 ml     Intake/Output this shift:  I/O this shift:  In: 250 [IV Piggyback:250]  Out: 150 [Chest Tube:150]    Labs  Lab Results   Component Value Date    WBC 14.73 (H) 07/20/2023    HGB 11.6 (L) 07/20/2023    HCT 33.7 (L) 07/20/2023     07/20/2023    CHOL 184 06/16/2023    TRIG 177 (H) 06/16/2023    HDL 50 06/16/2023    ALT 17 07/06/2023    AST 21 07/06/2023     07/20/2023    K 3.9 07/20/2023     07/20/2023    CREATININE 0.8 07/20/2023    BUN 22 07/20/2023    CO2 31 07/20/2023    MG 2.0 07/20/2023    PHOS 2.3 (L) 07/20/2023    TSH 2.85 07/06/2023    INR 1.3 07/18/2023    HGBA1C 5.3 06/16/2023    PT 16.0 (H) 07/18/2023    PTT 28 07/18/2023       Cardiac Imaging    TRANSTHORACIC ECHO (TTE) COMPLETE 07/06/2023    Interpretation Summary  The left ventricular cavity size is normal with normal wall thickness and preserved systolic function. EF 55% by visual estimate. Difficult to assess regional wall motions despite use of Definity-Grossly normal wall motion. Normal diastolic function for age.    Aortic valve cusps poorly visualized. The visualized portions of the aortic root and ascending aorta are normal in size.  No aortic coarctation.  No aortic insufficiency or stenosis.    The mitral valve is normal. Trace mitral regurgitation.    The left atrium is normal in size.    Limited visualization but the right ventricle appears normal size with preserved systolic function.    The pulmonic valve is not well seen.    The tricuspid is normal in appearance. Trace tricuspid regurgitation with insufficient jet to estimate RVSP.    Right atrium is not well seen.    The IVC is small and collapses >  50% with inspiration consistent with normal right atrial pressures.    No pericardial effusion.    No prior study for comparison.        Full Code    Head/Ear/Nose/Throat:     NCAT.                               Eyes:     EOMI and PERRL.                     Respiratory:     diminished at the bases b/l.               Cardiovascular:     SR/SB.              Gastrointestinal:     + Bowel sounds and non-tender.                 Genitourinary:     no-deformities.                    Extremities:     trace edema b/l lower extremities.            Musculoskeletal:      No injury or deformity.                   Neurological:     follows commands.       Behavior/Emotional:     appropriate and cooperative.                           Lymph:      No adenopathy noted.                               Skin:      Clean, dry and intact.     Examination of the patient performed at the bedside. Rounded with ICU team and discussed management/plan with surgical staff. Medications, radiological studies and labs reviewed and discussed with attending of record, Dr. Yvan Kaba.    Cardiothoracic Assessment and Plan:    Neurologic: A&Ox3 and pain controlled.     Cardiovascular: Severe CAD s/p CABGx1. Off vasoactive medications and meeting BP goals. Will continue to optimize cardiac medications. Continue statin for dyslipidemia and Continue ASA/Plavix.      Respiratory: I personally reviewed the most recent CXR which portrayed b/l pulmonary congestion and small lung volumes/atelectasis. Will encourage IS, mobilization and wean O2 PRN.      Genitourinary: No levi. Making adequate urine output. Will avoid nephrotoxic medications.     Endocrine: Follow FSBG.     Hematologic: No evidence active bleeding. Lovenox for DVT ppx. Acute (expected) blood loss anemia from surgery.      Infectious Disease: No indication for antibiotics at this time. Leukocytosis secondary to acute phase reactants.      Fluids, Electrolytes: Hypophosphatemia. Electrolytes  replaced per protocol. Goal for net negative fluid balance.    Gastrointestinal: PO as tolerated. GI ppx.     Skin: OOB, PT when able.      Tubes, lines and drains: Will remove superfluous invasive monitors PRN.    Disposition: Guarded. Continue close monitoring on stepdown unit.           Juan C Kauffman,   7/20/2023

## 2023-07-21 ENCOUNTER — APPOINTMENT (INPATIENT)
Dept: RADIOLOGY | Facility: HOSPITAL | Age: 63
DRG: 236 | End: 2023-07-21
Attending: NURSE PRACTITIONER
Payer: COMMERCIAL

## 2023-07-21 ENCOUNTER — TELEPHONE (OUTPATIENT)
Dept: CARDIOTHORACIC SURGERY | Facility: HOSPITAL | Age: 63
End: 2023-07-21
Payer: COMMERCIAL

## 2023-07-21 ENCOUNTER — APPOINTMENT (INPATIENT)
Dept: RADIOLOGY | Facility: HOSPITAL | Age: 63
DRG: 236 | End: 2023-07-21
Payer: COMMERCIAL

## 2023-07-21 VITALS
HEART RATE: 80 BPM | RESPIRATION RATE: 18 BRPM | OXYGEN SATURATION: 92 % | WEIGHT: 195.1 LBS | DIASTOLIC BLOOD PRESSURE: 54 MMHG | BODY MASS INDEX: 27.93 KG/M2 | SYSTOLIC BLOOD PRESSURE: 92 MMHG | HEIGHT: 70 IN | TEMPERATURE: 97.2 F

## 2023-07-21 LAB
ANION GAP SERPL CALC-SCNC: 5 MEQ/L (ref 3–15)
BUN SERPL-MCNC: 22 MG/DL (ref 7–25)
CALCIUM SERPL-MCNC: 8.2 MG/DL (ref 8.6–10.3)
CHLORIDE SERPL-SCNC: 104 MEQ/L (ref 98–107)
CO2 SERPL-SCNC: 29 MEQ/L (ref 21–31)
CREAT SERPL-MCNC: 0.8 MG/DL (ref 0.7–1.3)
CROSSMATCH: NORMAL
CROSSMATCH: NORMAL
ERYTHROCYTE [DISTWIDTH] IN BLOOD BY AUTOMATED COUNT: 12.7 % (ref 11.6–14.4)
GFR SERPL CREATININE-BSD FRML MDRD: >60 ML/MIN/1.73M*2
GLUCOSE SERPL-MCNC: 87 MG/DL (ref 70–99)
HCT VFR BLDCO AUTO: 30.5 % (ref 40.1–51)
HGB BLD-MCNC: 10.7 G/DL (ref 13.7–17.5)
ISBT CODE: 600
ISBT CODE: 600
MAGNESIUM SERPL-MCNC: 2.2 MG/DL (ref 1.9–2.7)
MCH RBC QN AUTO: 31.6 PG (ref 28–33.2)
MCHC RBC AUTO-ENTMCNC: 35.1 G/DL (ref 32.2–36.5)
MCV RBC AUTO: 90 FL (ref 83–98)
PDW BLD AUTO: 9.7 FL (ref 9.4–12.4)
PHOSPHATE SERPL-MCNC: 2.7 MG/DL (ref 2.4–4.7)
PLATELET # BLD AUTO: 177 K/UL (ref 150–350)
POTASSIUM SERPL-SCNC: 3.9 MEQ/L (ref 3.5–5.1)
PRODUCT CODE: NORMAL
PRODUCT CODE: NORMAL
PRODUCT STATUS: NORMAL
PRODUCT STATUS: NORMAL
RBC # BLD AUTO: 3.39 M/UL (ref 4.5–5.8)
SODIUM SERPL-SCNC: 138 MEQ/L (ref 136–145)
SPECIMEN EXP DATE BLD: NORMAL
SPECIMEN EXP DATE BLD: NORMAL
UNIT ABO: NORMAL
UNIT ABO: NORMAL
UNIT ID: NORMAL
UNIT ID: NORMAL
UNIT RH: NEGATIVE
UNIT RH: NEGATIVE
WBC # BLD AUTO: 10.89 K/UL (ref 3.8–10.5)

## 2023-07-21 PROCEDURE — 63600000 HC DRUGS/DETAIL CODE: Mod: JZ

## 2023-07-21 PROCEDURE — 71045 X-RAY EXAM CHEST 1 VIEW: CPT

## 2023-07-21 PROCEDURE — 84100 ASSAY OF PHOSPHORUS: CPT

## 2023-07-21 PROCEDURE — 63700000 HC SELF-ADMINISTRABLE DRUG

## 2023-07-21 PROCEDURE — 63700000 HC SELF-ADMINISTRABLE DRUG: Performed by: NURSE PRACTITIONER

## 2023-07-21 PROCEDURE — 85027 COMPLETE CBC AUTOMATED: CPT

## 2023-07-21 PROCEDURE — 63600000 HC DRUGS/DETAIL CODE: Mod: JZ | Performed by: NURSE PRACTITIONER

## 2023-07-21 PROCEDURE — 80048 BASIC METABOLIC PNL TOTAL CA: CPT

## 2023-07-21 PROCEDURE — 83735 ASSAY OF MAGNESIUM: CPT

## 2023-07-21 PROCEDURE — 99232 SBSQ HOSP IP/OBS MODERATE 35: CPT | Performed by: ANESTHESIOLOGY

## 2023-07-21 PROCEDURE — 63700000 HC SELF-ADMINISTRABLE DRUG: Performed by: PHYSICIAN ASSISTANT

## 2023-07-21 PROCEDURE — 36415 COLL VENOUS BLD VENIPUNCTURE: CPT

## 2023-07-21 PROCEDURE — 71046 X-RAY EXAM CHEST 2 VIEWS: CPT

## 2023-07-21 PROCEDURE — 99024 POSTOP FOLLOW-UP VISIT: CPT | Performed by: THORACIC SURGERY (CARDIOTHORACIC VASCULAR SURGERY)

## 2023-07-21 RX ORDER — FUROSEMIDE 40 MG/1
40 TABLET ORAL DAILY
Qty: 14 TABLET | Refills: 0 | Status: CANCELLED | OUTPATIENT
Start: 2023-07-22 | End: 2023-08-05

## 2023-07-21 RX ORDER — FUROSEMIDE 40 MG/1
40 TABLET ORAL DAILY
Qty: 14 TABLET | Refills: 0 | Status: SHIPPED | OUTPATIENT
Start: 2023-07-22 | End: 2023-07-26

## 2023-07-21 RX ORDER — FUROSEMIDE 40 MG/1
40 TABLET ORAL DAILY
Status: DISCONTINUED | OUTPATIENT
Start: 2023-07-22 | End: 2023-07-21 | Stop reason: HOSPADM

## 2023-07-21 RX ORDER — POTASSIUM CHLORIDE 750 MG/1
40 TABLET, EXTENDED RELEASE ORAL AS NEEDED
Status: DISCONTINUED | OUTPATIENT
Start: 2023-07-21 | End: 2023-07-21 | Stop reason: HOSPADM

## 2023-07-21 RX ORDER — ACETAMINOPHEN 325 MG/1
975 TABLET ORAL EVERY 6 HOURS PRN
Refills: 0
Start: 2023-07-21 | End: 2023-07-26

## 2023-07-21 RX ORDER — POTASSIUM CHLORIDE 14.9 MG/ML
20 INJECTION INTRAVENOUS AS NEEDED
Status: DISCONTINUED | OUTPATIENT
Start: 2023-07-21 | End: 2023-07-21 | Stop reason: HOSPADM

## 2023-07-21 RX ORDER — POTASSIUM CHLORIDE 750 MG/1
20 TABLET, EXTENDED RELEASE ORAL AS NEEDED
Status: DISCONTINUED | OUTPATIENT
Start: 2023-07-21 | End: 2023-07-21 | Stop reason: HOSPADM

## 2023-07-21 RX ORDER — POTASSIUM CHLORIDE 20 MEQ/1
20 TABLET, EXTENDED RELEASE ORAL DAILY
Qty: 14 TABLET | Refills: 0 | Status: SHIPPED | OUTPATIENT
Start: 2023-07-21 | End: 2023-07-26

## 2023-07-21 RX ORDER — OXYCODONE HYDROCHLORIDE 5 MG/1
5 TABLET ORAL EVERY 8 HOURS PRN
Qty: 15 TABLET | Refills: 0 | Status: SHIPPED | OUTPATIENT
Start: 2023-07-21 | End: 2023-07-26

## 2023-07-21 RX ORDER — METOPROLOL TARTRATE 25 MG/1
12.5 TABLET, FILM COATED ORAL 2 TIMES DAILY
Qty: 30 TABLET | Refills: 3 | Status: SHIPPED | OUTPATIENT
Start: 2023-07-21 | End: 2023-08-01

## 2023-07-21 RX ORDER — CLOPIDOGREL BISULFATE 75 MG/1
75 TABLET ORAL DAILY
Qty: 30 TABLET | Refills: 3 | Status: SHIPPED | OUTPATIENT
Start: 2023-07-21 | End: 2023-10-13 | Stop reason: SDUPTHER

## 2023-07-21 RX ORDER — COLCHICINE 0.6 MG/1
0.6 TABLET ORAL DAILY
Qty: 30 TABLET | Refills: 0 | Status: SHIPPED | OUTPATIENT
Start: 2023-07-21 | End: 2023-07-26 | Stop reason: SDUPTHER

## 2023-07-21 RX ADMIN — FUROSEMIDE 40 MG: 10 INJECTION, SOLUTION INTRAMUSCULAR; INTRAVENOUS at 08:00

## 2023-07-21 RX ADMIN — ACETAMINOPHEN 975 MG: 325 TABLET, FILM COATED ORAL at 07:42

## 2023-07-21 RX ADMIN — COLCHICINE 0.6 MG: 0.6 TABLET ORAL at 08:00

## 2023-07-21 RX ADMIN — CLOPIDOGREL BISULFATE 75 MG: 75 TABLET ORAL at 08:00

## 2023-07-21 RX ADMIN — MULTIPLE VITAMINS W/ MINERALS TAB 1 TABLET: TAB at 08:00

## 2023-07-21 RX ADMIN — POTASSIUM CHLORIDE 20 MEQ: 750 TABLET, EXTENDED RELEASE ORAL at 05:01

## 2023-07-21 RX ADMIN — KETOROLAC TROMETHAMINE 30 MG: 30 INJECTION, SOLUTION INTRAMUSCULAR; INTRAVENOUS at 03:48

## 2023-07-21 RX ADMIN — POTASSIUM CHLORIDE 20 MEQ: 750 TABLET, EXTENDED RELEASE ORAL at 08:00

## 2023-07-21 RX ADMIN — FAMOTIDINE 20 MG: 20 TABLET, FILM COATED ORAL at 08:03

## 2023-07-21 RX ADMIN — GABAPENTIN 300 MG: 300 CAPSULE ORAL at 08:00

## 2023-07-21 RX ADMIN — MAGNESIUM OXIDE TAB 400 MG (241.3 MG ELEMENTAL MG) 400 MG: 400 (241.3 MG) TAB at 08:00

## 2023-07-21 RX ADMIN — METOPROLOL TARTRATE 12.5 MG: 25 TABLET, FILM COATED ORAL at 08:00

## 2023-07-21 RX ADMIN — ASPIRIN 81 MG: 81 TABLET, COATED ORAL at 07:56

## 2023-07-21 ASSESSMENT — COGNITIVE AND FUNCTIONAL STATUS - GENERAL
WALKING IN HOSPITAL ROOM: 3 - A LITTLE
STANDING UP FROM CHAIR USING ARMS: 3 - A LITTLE
MOVING TO AND FROM BED TO CHAIR: 3 - A LITTLE
CLIMB 3 TO 5 STEPS WITH RAILING: 3 - A LITTLE

## 2023-07-21 NOTE — DISCHARGE INSTRUCTIONS
After Heart Surgery      For the first six to eight weeks after surgery, you’ll gain a little more energy and strength each day. Your surgery team will discuss what you can and can’t do as you recover. You’ll have good days and bad days--remember to take things slowly and rest when you get tired.        You have had MINIMALLY INVASIVE heart surgery (small incision on one side of your chest):     Lifting restrictions - No more than 10 pounds for 2-4 weeks for the arm on the affected side then as tolerated.  There should be no heavy lifting for at least 3 months.       Overhead/reaching - slowly, as tolerated.  Please see the attached stretching exercises.     Driving - 4 weeks (same as conventional open-heart surgery)     Return to work - as tolerated. Discuss this with your heart surgery team.        Medications    Very often, new medications are prescribed due to the fact you’ve had cardiac surgery and your body is adjusting to these changes.     Some of the medications will be recommended to continue long term, while others are only temporary for the first month post-surgery. We encourage you to make an appointment to see your cardiologist/referring physician within the first 2-3 weeks post-surgery in order to follow-up as they will take over your medication management, and likely make changes after the first 30 days.    Please take a plain variety 81 mg aspirin - using enteric coated may reduce its overall effectiveness.       Wound Care     Wash all incisions daily with soap and water, however do not scrub the incision. Use a fresh washcloth each day. Wash the chest incision and leg incisions first then the rest of your body. Do not bring the wash cloth back over any incision after washing other body parts as this can transmit bacteria into the wound and cause infection. Pat all wounds dry with a fresh towel.   Notify our service for development of redness or drainage.     Showering is encouraged, but please  avoid pools, hot tubs, baths, or other standing water around your wounds until your incisions become scarred and you are cleared in the office.      Do not apply lotions or anti-bacterial ointments over the wound. Some clear drainage may be expected. If this occurs, please apply a clean, dry bandage to the area and change it daily.    Patients frequently ask if they can apply scar reducing ointment or creams to their incision. You may do so after 8 weeks post-surgery as long as your incision is clean and well healed. We recommend Vitamin E ointment or Mederma scar ointment.       Showering     Avoid using very hot water--it can make your blood pressure drop and you become dizzy or pass out.     Ask someone to stand nearby in case you need help.    Daily showers using a clean, dry wash cloth that can be replaced each day. Allow the water and soap to run over the wound for 1-2 minutes, gently wash with bodywash/soap and rinse off, please avoid scrubbing the incision area.      Thoroughly dry the wound (gently) and allow to air out before getting dressed. Keeping wounds clean and dry and free of moisture is important to reducing the risk of infection.        Activity    Walking pumps blood through your heart, improving blood flow throughout your whole body.     Activity is greatly encouraged, beginning with short walks (up to 5 minutes) several times throughout the day. Increase walking time as tolerated.     Choose a safe place to walk with a level surface.     Wear supportive shoes to prevent injury to knees and ankles.     When walking longer distances, walk with someone. It’s more fun and helps you stay committed.      Weight Gain and/or Swelling    It is important to continue to weigh yourself every day, just as you were weighed daily in the hospital. Try to weigh yourself every morning after going to the bathroom, using the same scale. A gradual weight gain of 5 pounds in 3 to 5 days, or any increase in puffiness  or swelling in fingers and ankles, should be reported to your doctor immediately.          Driving     No driving for at least 4 weeks following surgery. You will be evaluated at your one-month follow-up appointment for driving clearance.  After 4 weeks following surgery, self-assess if you feel safe, clear-headed and ready to start driving. Start with short trips first. Once you are allowed to start driving, do not take narcotic pain medication prior to driving.         Sleep     Many patients complain about difficulty sleeping following hospital discharge. This is very normal and usually lasts about 4-6 weeks. This will resolve in time on its own. Try to avoid long naps during the day. Avoid caffeine in the afternoon or evening.          Mood     Many patients report they feel anxious or depressed after heart surgery. This is normal and can occur in patients who have no previous issues with anxiety or depression.     Discuss any specific concerns with your primary care physician.         Appetite     Many patients report decreased appetite or food tastes “bad”. This is a normal response to surgery.  You need to eat regularly in order to help your wounds heal.  Protein intake is important in the post-operative period.          Bowels    If you do not have a bowel movement within 2 days of being at home, try some prune juice or an over-the-counter laxative.  If there is still no bowel movement in 3 days, try an over-the-counter suppository.  If there is no bowel movement in the first week, notify your doctor immediately.          Home Healthcare Services    Our in-house team, including our social workers, will evaluate you and assess your individual needs.     In general, nurses monitor vital signs and evaluate the surgical wounds. Physical therapy may also be ordered for a limited time, to come to your home and work with you until you are cleared to sign up for outpatient cardiac rehab.     Please note, you will  still need additional support from friends or family, homecare generally does not assist with bathing or other daily activities.    You are generally cleared for cardiac rehab at your one-month follow-up visit. [facility] has several options as do other local hospitals. You will be given the prescription but you will need to call to register.        Returning to Work    For physical jobs, we will recommend up to 12 weeks from the time of surgery. Sedentary jobs can sometimes return to work sooner. This will be evaluated at the time of your one-month follow-up visit.    This is individually based, and we do recommend if you have paperwork to be completed that you initially request up to 12 weeks.    Your provider can advise you about the best plan for returning to work as you should not attempt heavy lifting (50 pounds or more) until you are at least 6 months post-operative.          Sexual Activity    Refrain from sexual activities for at least four weeks post-surgery.  Consider at that point to avoid weight bearing during sexual activity, follow your sternal precautions (arm, upper body limitations).         Things a patient can do to help with the recovery:    Daily Walks    Daily Showers    Continue using your spirometer, ten times per hour until you have returned to your pre-surgical activity level or until you can tolerate 3-4 walks daily    Get up and move every hour, even if it’s only a brief walk around the living room    Seek out a post cardiac surgery support group, there are multiple online resources    Over 25% of patients experience depression after heart surgery. If you are experiencing depression that has not improved over two weeks, or are having more bad days than good, please see your primary care physician, or seek out therapy services.          For any questions regarding scheduling, appointments or other information    For appointment inquiries please call the  at [phone] or  [phone]    For medical records inquiries: [phone]    For the Cardiothoracic Surgical ICU: [phone]    For the CT Surgery Stepdown unit: [phone]    Access Center (main number for the [facility] Heart group) [phone]    For Non-surgical issue or those related to pre-existing conditions not related to your surgery, please contact your primary care provider or specialist caring for that condition.            WHEN TO CALL THE DOCTOR     Temperature over 101     Increased redness or drainage from incisions     Unusual shortness of breath     No bowel movement in the first week     Gradual weight gain of 5 pounds in 3-5 days     Increased puffiness or swelling in fingers or ankles     Pain not relieved by pain pills          FOR ANY QUESTIONS OR CONCERNS,      CALL SURGEON'S OFFICE: [phone]          Do you still have other questions?      This is an informative web site for you to browse for more information on your cardiac surgery:    The Patient Guide to Heart, Lung, and Esophageal Surgery  ---  http://ctsurgerypatients.org    This is a website presented by cardiothoracic surgery professionals committed to improving patient care. Learn more about causes, symptoms, diagnoses, treatment options, and recovery for you or your family member’s condition through text, pictures, and videos.

## 2023-07-21 NOTE — PLAN OF CARE
Care Coordination Discharge Plan Note     Discharge Needs Assessment  Concerns to be Addressed: care coordination/care conferences, discharge planning  Current Discharge Risk:      Anticipated Discharge Plan  Anticipated Discharge Disposition: home with home health  Type of Home Care Services: nursing        Patient Choice  Offered/Gave Vendor List: yes  Patient's Choice of Community Agency(s): [facility]    Patient and/or patient guardian/advocate was made aware of their right to choose a provider. A list of eligible providers was presented and reviewed with the patient and/or patient guardian/advocate in written and/or verbal form. The list delineates providers in the patient’s desired geographic area who are participating in the Medicare program and/or providers contracted with the patient’s primary insurance. The Medicare list and quality ratings were obtained from the Medicare.gov [medicare.gov] website.    ---------------------------------------------------------------------------------------------------------------------    Interdisciplinary Discharge Plan Review:  Participants:advanced practice provider, , physical therapy, physician, nursing, social work/services    Concerns Comments: Per info received at surgical rounds carlene, pt is POD #3 s/p robotic CABG. Anticipating d/c home today with [facility] Phone: [phone] and HC liaison updated with DCP. spoke to the pt at bedside about DCP and in agreement. Pt confirmed that spouse will provide ride. Will continue to follow until d/c completed     1455: AVS faxed to [facility] fax: [phone]    Discharge Plan:   Disposition/Destination: Home Health Care - Other / Home  Discharge Facility:    Community Resources:      Discharge Transportation:  Is Out of Hospital DNR needed at Discharge: no  Does patient need discharge transport? No

## 2023-07-21 NOTE — NURSING NOTE
IV and tele monitor removed. Pt and wife were given discharge instructions. Pt gave verbal feedback of understanding of instructions, follow up appointments, and whom to call with further questions or concerns.

## 2023-07-21 NOTE — PLAN OF CARE
Care Coordination Discharge Plan Summary    Admission Assessment Summary    General Information  Readmission Within the last 30 days: no previous admission in last 30 days  Does patient have a : Yes  Patient-Specific Goals (include timeframe): Symptom improvement    Living Arrangements  Arrived From: home  Current Living Arrangements: home  People in Home: spouse  Home Accessibility: stairs to enter home (Group), stairs within home (Group)  Living Arrangement Comments: Pt lives with spouse in a large [facility] with 4STE with possible FFSU.    Social Determinants of Health - Screenings  Housing Stability  In the last 12 months, was there a time when you were not able to pay the mortgage or rent on time?: No  In the last 12 months, was there a time when you did not have a steady place to sleep or slept in a shelter (including now)?: No  Financial Resource Strain  How hard is it for you to pay for the very basics like food, housing, medical care, and heating?: Not hard at all  Transportation Needs  In the past 12 months, has lack of transportation kept you from medical appointments or from getting medications?: No  In the past 12 months, has lack of transportation kept you from meetings, work, or from getting things needed for daily living?: No    Functional Status Prior to Admission  Assistive Device/Animal Currently Used at Home: none  Functional Status Comments: PLOF is iADLs without AD  IADL Comments:      Discharge Needs Assessment    Concerns to be Addressed: care coordination/care conferences, discharge planning  Current Discharge Risk:    Anticipated Changes Related to Illness: inability to work, inability to return to school, inability to care for someone else    Discharge Plan Summary    Patient Choice  Offered/Gave Vendor List: yes  Patient's Choice of Community Agency(s): [facility]  Patient and/or patient guardian/advocate was made aware of their right to choose a provider. A list of  eligible providers was presented and reviewed with the patient and/or patient guardian/advocate in written and/or verbal form. The list delineates providers in the patient’s desired geographic area who are participating in the Medicare program and/or providers contracted with the patient’s primary insurance. The Medicare list and quality ratings were obtained from the Medicare.gov [medicare.gov] website.    Discharge Plan:  Disposition/Destination: Home Health Care - Other / Home       Connection to Community  Not applicable    Community Resources:      Discharge Transportation:  Is Out of Hospital DNR needed at Discharge: no  Does patient need discharge transport? No

## 2023-07-21 NOTE — NURSING NOTE
IV removed from patient. Cardiac monitor removed from patient. Patient set up in the shower. Awaiting patients wife for discharge instructions and .

## 2023-07-21 NOTE — DISCHARGE SUMMARY
Cardiac Surgery Discharge Summary    BRIEF OVERVIEW    Attending Provider: Yvan Kaba DO Attending phys phone: [phone]  Primary Care Physician at Discharge: Yennifer Oliveira [phone]    Admission Date: 7/18/2023     Discharge Date: 7/21/2023    Primary Discharge Diagnosis  Coronary artery disease of native artery of native heart with stable angina pectoris (CMS/HCC)    Secondary Discharge Diagnosis  Active Hospital Problems    Diagnosis Date Noted   • Coronary artery disease of native artery of native heart with stable angina pectoris (CMS/HCC) 07/18/2023      Resolved Hospital Problems   No resolved problems to display.       Presenting Problem/History of Present Illness  Coronary artery disease involving native coronary artery of native heart with angina pectoris (CMS/HCC) [I25.119]  Coronary artery disease of native artery of native heart with stable angina pectoris (CMS/HCC) [I25.118]     Chidi Ramsey is a 62 y.o. male who was admitted for Shortness of breath [R06.02] and Abnormal nuclear stress test [R94.39]. Patient was referred by Dr. West Bai for Robotic revascularization of CCAD of the Ostial LAD.    Patient is 62 y.o. male with a Pmhx significant for elevated triglycerides who presented today to undergo an elective heart catheterization due to abnormal stress test findings. Prior to today patient had been experiencing atypical chest pain and shortness of breath prompting further evaluation by his Cardiologist Dr. Pham. On June 13 he underwent a Nuclear stress test which showed  moderate size anterior septal defect with mild reversibility as well in the apical septum with an EF of 55%. Of note he also had a 6 day trial with a ZIO patch for recent episode of palpitations which was notable for a 12 beat run of V-tach. Today he underwent a LHC which demonstrated 100% ostial LAD chronic total occlusion with left to left and right to left collaterals and  mild nonobstructive disease in the remaining coronary tree. CTS was subsequently consulted evaluation of Robotic revascularization.      Patient seen and evaluated in Cath lab holding accompanied by his wife and Daughter. I discussed with him Cath findings and reason for consult. He reports that prior to Catheterization he has not been feeling the best but has remained relatively active by intermittently playing golf. He denies any current chest pain, shortness of breath, palpitations, or dizziness.       DETAILS OF HOSPITAL STAY    Operative Procedures Performed  OP CAB MID/THORACOTOMY ELENA, ROBOTIC     Hospital Course  Chidi Ramsey is a 62 y.o. male who underwent OP CAB MID/THORACOTOMY ELENA, ROBOTIC  with Yvan Kaba DO on 7/18/23. Intraoperatively patient received no blood products and arrived to CTICU extubated on no drips. Overnight he was given albumin and started on Levo for low BP and CI. On POD #1 the levo was weaned off and he remained hemodynamically stable. He was delined and 1 chest tube was removed. He was transferred to the SDU. On POD #2 remained stable. On POD#3 his remaining chest tube was removed. A follow up CXR was stable.     On day of discharge patient was hemodynamically stable and was deemed appropriate for discharge. He was ambulating independently on room air in hallway. He should follow up with his PCP in 1-2 weeks and his primary cardiologist in 2-3 weeks.     Patient will be discharged to home with home care. Follow up appointments were made for Yvan Kaba DO and discharge instructions were given. On day of DC pt denied cp, sob, orthopnea, and activity intolerance. Pt was hemodynamically stable. DC instructions were reviewed with pt and prescriptions were given. Pt was able to teach back instructions for medication regimen and understands the need to follow make appropriate follow up schedule per DC instructions    Admission weight: 190 lbs  Discharge weight: 195  lbs     Exam on Day of Discharge  Patient seen and examined on day of discharge.  Physical Exam  Constitutional:       Appearance: Normal appearance.   HENT:      Head: Normocephalic.      Nose: Nose normal.      Mouth/Throat:      Mouth: Mucous membranes are moist.   Eyes:      Pupils: Pupils are equal, round, and reactive to light.   Cardiovascular:      Rate and Rhythm: Normal rate and regular rhythm.      Pulses: Normal pulses.      Heart sounds: Normal heart sounds.   Pulmonary:      Effort: Pulmonary effort is normal.      Breath sounds: Normal breath sounds.   Abdominal:      General: Bowel sounds are normal.   Musculoskeletal:         General: Normal range of motion.      Cervical back: Normal range of motion.   Skin:     General: Skin is warm.      Comments: Left breast incision ALVA with dermabond. Upper and lower chest tube site sutures removed prior to DC.   Neurological:      General: No focal deficit present.      Mental Status: He is alert and oriented to person, place, and time.   Psychiatric:         Mood and Affect: Mood normal.         Behavior: Behavior normal.          Consults:   Consult Notes 6/20/2023 to 7/21/2023         Date of Service Author Author Type Status Note Type File Time    07/20/23 1248 Patricia Han, RN Case Manager Signed Consults 07/20/23 1248    07/19/23 0847 Alejandrina Guerrero RD Registered Dietitian Signed Consults 07/19/23 0850    07/18/23 1604 Cathy Cook DO Physician Signed Consults 07/18/23 1609    07/06/23 0958 Joseph Samuel CRNP Nurse Practitioner Attested Consults 07/16/23 2120          Consult Orders During Admission:  IP CONSULT TO PHYSICAL MEDICINE REHAB  IP CONSULT TO NUTRITION SERVICES  IP CONSULT TO CASE MANAGEMENT       Imaging  X-RAY CHEST 1 VIEW    Result Date: 7/20/2023  IMPRESSION: Left chest tube removal. Left basilar pleural-parenchymal opacities, similar to prior. Shallow inspiration    X-RAY CHEST 1 VIEW    Result Date:  7/19/2023  IMPRESSION: Stable lines and tubes, as below. No pneumothorax. Stable pleural/parenchymal opacities projecting over the left lung base. COMMENT: Comparison: Chest x-ray 7/18/2023. Technique: A single frontal AP portable upright projection of the chest was obtained. FINDINGS: The right internal jugular central venous catheter terminates over the SVC. There is similar positioning of 2 left-sided chest tubes. There are stable pleural/parenchymal opacities projecting over the left lung base. The right lung is clear. There is no pneumothorax. The cardiomediastinal silhouette is unchanged. The upper abdomen is unremarkable. There is no acute osseous abnormality.    X-RAY CHEST 1 VIEW    Result Date: 7/18/2023  markings. No large effusion or pneumothorax. IMPRESSION: Postop change.     Ultrasound carotid bilateral    Result Date: 7/6/2023  IMPRESSION: Minimal plaque within the internal carotid arteries, causing less than 50% stenosis. CAROTID DUPLEX CRITERIA:  Measurement of carotid stenosis is based on velocity parameters that correlate the residual internal carotid diameter with [employer] Symptomatic Carotid Endarterectomy Trial (NASCET)-based stenosis levels.     X-RAY CHEST 2 VIEWS    Result Date: 7/6/2023  IMPRESSION: No acute cardiopulmonary process.        Discharge Orders  Released Discharge Orders     Order Details Provider Status    acetaminophen (TYLENOL) 325 mg tablet Take 3 tablets (975 mg total) by mouth every 6 (six) hours as needed for mild pain. Ritika Snell CRNP Prescribed    ascorbic acid/multivit-min (EMERGEN-C ORAL) Take 1 packet by mouth daily. Ritika Snell CRNP Resume at Discharge (Patient Reported)    aspirin 81 mg enteric coated tablet Take 81 mg by mouth daily. Ritika Snell CRNP Resume at Discharge (Patient Reported)    cholecalciferol, vitamin D3, 1,000 unit (25 mcg) tablet Take 1,000 Units by mouth daily. Ritika Snell CRNP Resume at Discharge (Patient Reported)     clopidogreL (PLAVIX) 75 mg tablet Take 1 tablet (75 mg total) by mouth daily. Ritika Snell CRNP Prescribed    colchicine (COLCRYS) 0.6 mg tablet Take 1 tablet (0.6 mg total) by mouth daily. Ritika Snell CRNP Prescribed    furosemide (LASIX) 40 mg tablet Take 1 tablet (40 mg total) by mouth daily for 14 days. Ritika Snell CRNP Prescribed    ibuprofen (MOTRIN) 200 mg tablet Take 200 mg by mouth 2 (two) times a day as needed for mild pain. Ritika Snell CRNP Do Not Resume at Discharge    metoprolol tartrate (LOPRESSOR) 25 mg tablet Take 0.5 tablets (12.5 mg total) by mouth 2 (two) times a day. Ritika Snell CRNP Prescribed    mv-min/vit C/glut/lysine/hb124 (IMMUNE SUPPORT ORAL) Take 1 tablet by mouth daily. Ritika Snell CRNP Resume at Discharge (Patient Reported)    oxyCODONE (ROXICODONE) 5 mg immediate release tablet Take 1 tablet (5 mg total) by mouth every 8 (eight) hours as needed for pain for up to 5 days Indications: pain. Ritika Snell CRNP Prescribed    potassium chloride (KLOR-CON M) 20 mEq CR tablet Take 1 tablet (20 mEq total) by mouth daily. Ritika Snell CRNP Prescribed    rosuvastatin (CRESTOR) 20 mg tablet Take 20 mg by mouth daily. Ritika Snell CRNP Resume at Discharge (Patient Reported)    vitamin B complex (B COMPLEX ORAL) Take 1 tablet by mouth daily. Ritika Snell CRNP Resume at Discharge (Patient Reported)    acetylcysteine (NAC ORAL) Take 1,000 mg by mouth daily. Ritika Snell CRNP Resume at Discharge (Patient Reported)    alum-mag hydroxide-simeth (MAALOX) 200-200-20 mg/5 mL suspension 30 mL 30 mL, oral, Every 4 hours PRN, indigestion, Starting on Tue 7/18/23 at 1252** Shake well before administration ** Ritika Snell CRNP Do Not Order at Discharge    aspirin enteric coated tablet 81 mg 81 mg, oral, Daily, First dose on Tue 7/18/23 at 1345Hold for platelet count less than 50,000. ** Do not crush, chew, or dutton **  Ritika Snell CRNP Do Not Order at Discharge     atropine injection 0.5 mg 0.5 mg, intravenous, Every 5 min PRN, bradycardia, symptomatic bradycardia; max 3 mg and notify MD, Starting on Tue 7/18/23 at 1252, For 6 dosesRN Administration: must be ALS trained Ritika Snell CRNP Do Not Order at Discharge    bisacodyL (DULCOLAX) 10 mg suppository 10 mg 10 mg, rectal, As needed, constipation, x 1 dose on POD# 3 if patient has not moved bowels, Starting on Fri 7/21/23 at 0000, For 1 dose Ritika Snell CRNP Do Not Order at Discharge    bupivacaine PF (MARCAINE) 0.25 % injection 20 mL 20 mL, infiltration, Every 4 hours PRN, severe pain, Starting on Tue 7/18/23 at 1315, For 6 dosesTo be administered by anesthesiologist/JEREMIAS ONLY. Administer 20 mL into chest tube Ritika Snell CRNP Do Not Order at Discharge    bupivacaine PF (MARCAINE) 0.25 % injection 20 mL 20 mL, infiltration, Every 4 hours PRN, severe pain, Starting on Tue 7/18/23 at 1316, For 6 dosesTo be administered by anesthesiologist/JEREMIAS ONLY. Administer 20 mL into chest tube Ritika Snell CRNP Do Not Order at Discharge    calcium chloride 1 g in sodium chloride 0.9 % 50 mL IVPB 1 g, intravenous, at 100 mL/hr, Administer over 30 Minutes, Every 6 hours PRN, ionized Ca less than 1.15 mmol/L, Starting on Tue 7/18/23 at 1252** CENTRAL LINE PREFERRED ** AVOID MIDLINE ** Ritika Snell CRNP Do Not Order at Discharge    calcium chloride 100 mg/mL (10 %) injection 0.5 g 0.5 g, intravenous, Once as needed, SBP < 70, Starting on Tue 7/18/23 at 1252, For 1 doseICU use.  IV push Ritika Snell CRNP Do Not Order at Discharge    docusate sodium (COLACE) capsule 100 mg 100 mg, oral, 2 times daily, First dose on Tue 7/18/23 at 1345Hold for loose stools or diarrhea. Ritika Snell CRNP Do Not Order at Discharge    enoxaparin (LOVENOX) syringe 40 mg 40 mg, subcutaneous, Daily (6p), First dose on Wed 7/19/23 at 1800Begin 1800 post-op day 1 *Caution*: Do not purge air bubble from 30mg or 40mg syringes prior to administration  to avoid loss of drug. The air bubble may be expelled from doses larger than 40mg to adjust for exact dose.  Must be deep SubQ Injection with the patient lying down.  Use left and right anteroLATERAL and left and right posteroLATERAL abdominal wall.  AVOID injections sites adjacent to SURGICAL SITES.  Must alternate sites from side to side with each injection.  Indications: deep vein thrombosis prevention Ritika Snell CRNP Do Not Order at Discharge    famotidine (PEPCID) tablet 20 mg 20 mg, oral, 2 times daily, First dose on Tue 7/18/23 at 1345Indications: prevention of stress ulcer Ritika Snell CRNP Do Not Order at Discharge    gabapentin (NEURONTIN) capsule 300 mg 300 mg, oral, Every 12 hours, First dose on Tue 7/18/23 at 2100, For 4 days Ritika Snell CRNP Do Not Order at Discharge    gabapentin (NEURONTIN) capsule 300 mg 300 mg, oral, Daily, First dose on Sun 7/23/23 at 0900 Ritika Snell CRNP Do Not Order at Discharge    HYDROmorphone (DILAUDID) injection 0.25 mg 0.25 mg, intravenous, Every 4 hours PRN, pain, REFRACTORY to oral oxycodone dose., Starting on Tue 7/18/23 at 1252Administer 45-90 minutes after initial oxycodone dose. Hold if POSS is 3 or 4. If pain is unrelieved by Hydromorphone, contact provider. Ritika Snell CRNP Do Not Order at Discharge    ketorolac (TORADOL) injection 30 mg 30 mg, intravenous, Every 6 hours PRN, pain, Starting on u 7/20/23 at 2058, For 6 dosesToradol to be used as first line for breakthrough pain, oxycodone to be used if toradol contraindicated or not due for redosing For Intravenous Administration  Inject over no less than 15 seconds Can be diluted in 0.9% Sodium Chloride to reduce burning in peripheral lines.  Ritika Snell CRNP Do Not Order at Discharge    magnesium hydroxide (M.O.M.) 400 mg/5 mL suspension 30 mL 30 mL, oral, Daily, First dose on u 7/20/23 at 0900** Shake well before administration ** Ritika Snell CRNP Do Not Order at Discharge     magnesium oxide (MAG-OX) tablet 400 mg 400 mg, oral, 2 times daily, First dose on Tue 7/18/23 at 1345Hold if Magnesium level >= 2.8 mg/dL and notify physician Ritika Snell CRNP Do Not Order at Discharge    magnesium sulfate IVPB 2g in 50 mL NSS/[id number]/SWFI 2 g, intravenous, at 25 mL/hr, Administer over 120 Minutes, As needed, if magnesium level < 2.2 mg/dL, Starting on Tue 7/18/23 at 1252 Ritika Snell CRNP Do Not Order at Discharge    multivitamin tablet 1 tablet 1 tablet, oral, Daily, First dose on Tue 7/18/23 at 1345 Ritika Snell CRNP Do Not Order at Discharge    ondansetron (ZOFRAN) injection 4 mg 4 mg, intravenous, Every 8 hours PRN, nausea, vomiting, Nausea/Vomiting, Starting on Tue 7/18/23 at 1252If unable to take orally. Ritika Snell CRNP Do Not Order at Discharge    ondansetron ODT (ZOFRAN-ODT) disintegrating tablet 4 mg 4 mg, oral, Every 8 hours PRN, nausea, vomiting, Nausea/Vomiting, Starting on Tue 7/18/23 at 1252 Ritika Snell CRNP Do Not Order at Discharge    polyethylene glycol (MIRALAX) 17 gram packet 17 g 17 g, oral, Daily, First dose on Tue 7/18/23 at 1345Hold for loose stools or diarrhea. Ritika Snell CRNP Do Not Order at Discharge    potassium chloride (KLOR-CON M) ER tablet (particles/crystals) 20 mEq 20 mEq, oral, As needed, for K 3.5 - 3.9 mEq/L, Starting on Fri 7/21/23 at 0456** This oral dosage form should NOT be crushed or pierced **  For patients unable to swallow whole, may split tablet or dissolve.   To dissolve: Place tablet in 4 oz of water and allow 2 minutes to dissolve.  Stir for 30 seconds and administer immediately.  Rinse cup with 1 oz of water and administer immediately. Ritika Snell CRNP Do Not Order at Discharge    potassium chloride (KLOR-CON M) ER tablet (particles/crystals) 40 mEq 40 mEq, oral, As needed, for K less than 3.5 mEq/L, Starting on Fri 7/21/23 at 0456** This oral dosage form should NOT be crushed or pierced **  For patients unable to swallow  whole, may split tablet or dissolve.   To dissolve: Place tablet in 4 oz of water and allow 2 minutes to dissolve.  Stir for 30 seconds and administer immediately.  Rinse cup with 1 oz of water and administer immediately. Ritika Snell CRNP Do Not Order at Discharge    potassium chloride 20 mEq in 100 mL IVPB  (premix) 20 mEq, intravenous, at 50 mL/hr, Administer over 120 Minutes, As needed, for K 3.5 - 3.9 mEq/L if unable to take orally, Starting on Fri 7/21/23 at 0456** CENTRAL LINE PREFERRED ** Ritika Snell CRNP Do Not Order at Discharge    potassium chloride 20 mEq in 100 mL IVPB  (premix) 20 mEq, intravenous, at 50 mL/hr, Administer over 120 Minutes, As needed, for K less than 3.5 mEq/L if unable to take orally, Starting on Fri 7/21/23 at 0456Bag 1 of 2 (total dose = 40 mEq) * DO NOT GIVE BOTH BAGS OF 20 MEQ IV POTASSIUM AT THE SAME TIME * Give the first 20 mEq bag over 2 hours FOLLOWED BY the second 20 mEq bag over 2 hours NOTE: the total 40 mEq dose NEEDS to be run over 4 hours ** CENTRAL LINE PREFERRED ** Ritika Snell CRNP Do Not Order at Discharge    potassium chloride 20 mEq in 100 mL IVPB  (premix) 20 mEq, intravenous, at 50 mL/hr, Administer over 120 Minutes, As needed, for K less than 3.5 mEq/L if unable to take orally, Starting on Fri 7/21/23 at 0456Bag 2 of 2 (total dose = 40 mEq) * DO NOT GIVE BOTH BAGS OF 20 MEQ IV POTASSIUM AT THE SAME TIME * Give the first 20 mEq bag over 2 hours FOLLOWED BY the second 20 mEq bag over 2 hours NOTE: the total 40 mEq dose NEEDS to be run over 4 hours ** CENTRAL LINE PREFERRED ** Ritika Snell CRNP Do Not Order at Discharge    rosuvastatin (CRESTOR) tablet 20 mg 20 mg, oral, Daily (6p), First dose on Tue 7/18/23 at 1800 Ritika Snell CRNP Do Not Order at Discharge    senna (SENOKOT) tablet 1 tablet 1 tablet, oral, 2 times daily, First dose on Tue 7/18/23 at 1345Hold for loose stools or diarrhea. Ritika Snell CRNP Do Not Order at Discharge    sodium  "bicarbonate 8.4 % (1 mEq/mL) injection  mEq  mEq, intravenous, As needed, metabolic acidosis, Starting on Tue 7/18/23 at 1252(ICU) if pH > 7.35 do not treat.  For base excess -3 to -4 give 50 mEq (1 syringe); -5 to -6 give 100 mEq (2 syringe) and notify MD.  >= -7 notify MD.\" Xochitl Stanford CRNP Do Not Order at Discharge    CRESTOR 20 mg tablet Take 1 tablet (20 mg total) by mouth daily. Delfina Marin, PharmD Discontinued    Follow-up with primary physician (PCP) Follow up with your PCP in 1-2 weeks. Please call for an appt. Xochitl Stanford CRNP New at Discharge    Follow-up with provider Referral By - XOCHITL STANFORD 1 visitFollow up with your primary cardiologist in 2-3 weeks. Please call for an appt. Xochitl Stanford CRNP New at Discharge          Outpatient Follow-Ups            In 1 month Yvan Kaba,  [facility]    In 2 months Yennifer Oliveira,  [facility] in [place]        Referrals:  No orders of the defined types were placed in this encounter.      Discharge Disposition  Home Health Care - Other  Code Status at Discharge: Full Code  Physician Order for Life-Sustaining Treatment Document Status      No documents found                ANAI Lynch   "

## 2023-07-21 NOTE — PROGRESS NOTES
Cardiothoracic Intensivist Progress Note       CARDIOTHORACIC   IV dye allergy: no    Post-Operative Day # 3     Subjective     History of Present Illness: Chidi Ramsey is a 62 y.o. cisgender male with history of known coronary artery disease prior to OR s/p CABG, chronic hyperlipidemia that is being treated.                              Review of Systems:                                     Constitutional: no acute distress                                                  Eyes: denies difficulty with vision  Ears, nose, mouth, throat, and face: denies oral discomfort                                        Respiratory: denies shortness of breath                                  Cardiovascular: mild chest discomfort at incision site                                  Gastrointestinal: denies abdominal pain                                    Genitourinary: denies difficulty voiding                                      Hematologic: denies bleeding issues                                Musculoskeletal: denies muscle pain                                      Neurological: denies weakness                                   Integumentary: denies rash    Medical History:   Past Medical History:   Diagnosis Date   • Mixed hyperlipidemia 6/13/2023   • Palpitations 06/12/2023   • SOB (shortness of breath)        Surgical History:   Past Surgical History:   Procedure Laterality Date   • APPENDECTOMY      2019       Prior to Admission medications    Medication Sig Start Date End Date Taking? Authorizing Provider   ascorbic acid/multivit-min (EMERGEN-C ORAL) Take 1 packet by mouth daily.   Yes Provider, Kleber Arciniega MD   aspirin 81 mg enteric coated tablet Take 81 mg by mouth daily.   Yes ProviderKleber MD   cholecalciferol, vitamin D3, 1,000 unit (25 mcg) tablet Take 1,000 Units by mouth daily.   Yes ProviderKleber MD   ibuprofen (MOTRIN) 200 mg tablet Take 200 mg by mouth 2 (two) times a day as  needed for mild pain.   Yes Provider, Kleber Arciniega MD   mv-min/vit C/glut/lysine/hb124 (IMMUNE SUPPORT ORAL) Take 1 tablet by mouth daily.   Yes ProviderKleber MD   rosuvastatin (CRESTOR) 20 mg tablet Take 20 mg by mouth daily.   Yes ProviderKleber MD   vitamin B complex (B COMPLEX ORAL) Take 1 tablet by mouth daily.   Yes ProviderKleber MD   acetylcysteine (NAC ORAL) Take 1,000 mg by mouth daily.    Provider, Kleber Arciniega MD       Allergies: Augmentin [amoxicillin-pot clavulanate]    Social History:   Social History     Socioeconomic History   • Marital status:      Spouse name: None   • Number of children: None   • Years of education: None   • Highest education level: None   Tobacco Use   • Smoking status: Never   • Smokeless tobacco: Never   Vaping Use   • Vaping Use: Never used   Substance and Sexual Activity   • Alcohol use: Yes     Comment: socially   • Drug use: Never   • Sexual activity: Defer     Social Determinants of Health     Financial Resource Strain: Low Risk  (7/18/2023)    Overall Financial Resource Strain (CARDIA)    • Difficulty of Paying Living Expenses: Not hard at all   Transportation Needs: No Transportation Needs (7/18/2023)    PRAPARE - Transportation    • Lack of Transportation (Medical): No    • Lack of Transportation (Non-Medical): No   Housing Stability: Unknown (7/18/2023)    Housing Stability Vital Sign    • Unable to Pay for Housing in the Last Year: No    • Unstable Housing in the Last Year: No       Family History:   Family History   Problem Relation Age of Onset   • Multiple sclerosis Biological Mother    • Hyperlipidemia Biological Father    • Hypertension Biological Father    • Diabetes Biological Father    • No Known Problems Biological Sister    • No Known Problems Biological Sister    • No Known Problems Biological Sister    • No Known Problems Biological Sister    • No Known Problems Biological Brother        Objective     Vital  signs in last 24 hours:  Temp:  [36.2 °C (97.2 °F)-37.2 °C (98.9 °F)] 36.2 °C (97.2 °F)  Heart Rate:  [70-87] 87  Resp:  [16-18] 18  BP: ()/(55-72) 114/68      Intake/Output Summary (Last 24 hours) at 7/21/2023 0931  Last data filed at 7/21/2023 0925  Gross per 24 hour   Intake 730 ml   Output 1210 ml   Net -480 ml     Intake/Output this shift:  I/O this shift:  In: -   Out: 300 [Urine:300]    Labs  Lab Results   Component Value Date    WBC 10.89 (H) 07/21/2023    HGB 10.7 (L) 07/21/2023    HCT 30.5 (L) 07/21/2023     07/21/2023    CHOL 184 06/16/2023    TRIG 177 (H) 06/16/2023    HDL 50 06/16/2023    ALT 17 07/06/2023    AST 21 07/06/2023     07/21/2023    K 3.9 07/21/2023     07/21/2023    CREATININE 0.8 07/21/2023    BUN 22 07/21/2023    CO2 29 07/21/2023    MG 2.2 07/21/2023    PHOS 2.7 07/21/2023    TSH 2.85 07/06/2023    INR 1.3 07/18/2023    HGBA1C 5.3 06/16/2023    PT 16.0 (H) 07/18/2023    PTT 28 07/18/2023       Cardiac Imaging    TRANSTHORACIC ECHO (TTE) COMPLETE 07/06/2023    Interpretation Summary  The left ventricular cavity size is normal with normal wall thickness and preserved systolic function. EF 55% by visual estimate. Difficult to assess regional wall motions despite use of Definity-Grossly normal wall motion. Normal diastolic function for age.    Aortic valve cusps poorly visualized. The visualized portions of the aortic root and ascending aorta are normal in size.  No aortic coarctation.  No aortic insufficiency or stenosis.    The mitral valve is normal. Trace mitral regurgitation.    The left atrium is normal in size.    Limited visualization but the right ventricle appears normal size with preserved systolic function.    The pulmonic valve is not well seen.    The tricuspid is normal in appearance. Trace tricuspid regurgitation with insufficient jet to estimate RVSP.    Right atrium is not well seen.    The IVC is small and collapses > 50% with inspiration  consistent with normal right atrial pressures.    No pericardial effusion.    No prior study for comparison.        Full Code    Head/Ear/Nose/Throat:     NCAT.                               Eyes:     EOMI and PERRL.                     Respiratory:     diminished at the bases b/l.               Cardiovascular:     SR/SB.              Gastrointestinal:     + Bowel sounds and non-tender.                 Genitourinary:     no-deformities.                    Extremities:     trace edema b/l lower extremities.            Musculoskeletal:      No injury or deformity.                   Neurological:     follows commands.       Behavior/Emotional:     appropriate and cooperative.                           Lymph:      No adenopathy noted.                               Skin:      Clean, dry and intact.     Examination of the patient performed at the bedside. Rounded with ICU team and discussed management/plan with surgical staff. Medications, radiological studies and labs reviewed and discussed with attending of record, Dr. Yvan Kaba.    Cardiothoracic Assessment and Plan:    Neurologic: A&Ox3 and pain controlled.     Cardiovascular: Severe CAD s/p CABGx1. Off vasoactive medications and meeting BP goals. Will continue to optimize cardiac medications. Continue statin for dyslipidemia and Continue ASA/Plavix.      Respiratory: I personally reviewed the most recent CXR which portrayed b/l pulmonary congestion and small lung volumes/atelectasis with interval clearing compared to previous exam. Will encourage IS, mobilization and wean O2 PRN.      Genitourinary: No levi. Making adequate urine output. Will avoid nephrotoxic medications.     Endocrine: Follow FSBG.     Hematologic: No evidence active bleeding. Lovenox for DVT ppx. Acute (expected) blood loss anemia from surgery.      Infectious Disease: No indication for antibiotics at this time. Leukocytosis secondary to acute phase reactants.      Fluids,  Electrolytes:Electrolytes replaced per protocol. Goal for net negative fluid balance. +5 pounds from admission weight.     Gastrointestinal: PO as tolerated. GI ppx.     Skin: OOB, PT when able.      Tubes, lines and drains: Will remove superfluous invasive monitors PRN.    Disposition: Plan for discharge home today.           Juan C Kauffman,   7/21/2023

## 2023-07-26 ENCOUNTER — TELEPHONE (OUTPATIENT)
Dept: SCHEDULING | Facility: CLINIC | Age: 63
End: 2023-07-26
Payer: COMMERCIAL

## 2023-07-26 ENCOUNTER — OFFICE VISIT (OUTPATIENT)
Dept: CARDIOLOGY | Facility: CLINIC | Age: 63
End: 2023-07-26
Payer: COMMERCIAL

## 2023-07-26 VITALS
SYSTOLIC BLOOD PRESSURE: 104 MMHG | OXYGEN SATURATION: 96 % | HEART RATE: 95 BPM | WEIGHT: 188.8 LBS | BODY MASS INDEX: 28.61 KG/M2 | RESPIRATION RATE: 20 BRPM | HEIGHT: 68 IN | DIASTOLIC BLOOD PRESSURE: 60 MMHG

## 2023-07-26 DIAGNOSIS — R05.9 COUGH, UNSPECIFIED TYPE: ICD-10-CM

## 2023-07-26 DIAGNOSIS — E78.2 MIXED HYPERLIPIDEMIA: ICD-10-CM

## 2023-07-26 DIAGNOSIS — I25.118 CORONARY ARTERY DISEASE OF NATIVE ARTERY OF NATIVE HEART WITH STABLE ANGINA PECTORIS (CMS/HCC): ICD-10-CM

## 2023-07-26 DIAGNOSIS — I97.0 POST PERICARDIOTOMY SYNDROME: ICD-10-CM

## 2023-07-26 DIAGNOSIS — I25.118 CORONARY ARTERY DISEASE INVOLVING NATIVE CORONARY ARTERY OF NATIVE HEART WITH OTHER FORM OF ANGINA PECTORIS: Primary | ICD-10-CM

## 2023-07-26 LAB
BASOPHILS # BLD AUTO: 0.1 X10E3/UL (ref 0–0.2)
BASOPHILS NFR BLD AUTO: 0 %
EOSINOPHIL # BLD AUTO: 0.1 X10E3/UL (ref 0–0.4)
EOSINOPHIL NFR BLD AUTO: 1 %
ERYTHROCYTE [DISTWIDTH] IN BLOOD BY AUTOMATED COUNT: 12.2 % (ref 11.6–15.4)
HCT VFR BLD AUTO: 36.1 % (ref 37.5–51)
HGB BLD-MCNC: 12.4 G/DL (ref 13–17.7)
IMM GRANULOCYTES # BLD AUTO: 0.1 X10E3/UL (ref 0–0.1)
IMM GRANULOCYTES NFR BLD AUTO: 1 %
LYMPHOCYTES # BLD AUTO: 1.7 X10E3/UL (ref 0.7–3.1)
LYMPHOCYTES NFR BLD AUTO: 12 %
MCH RBC QN AUTO: 31.7 PG (ref 26.6–33)
MCHC RBC AUTO-ENTMCNC: 34.3 G/DL (ref 31.5–35.7)
MCV RBC AUTO: 92 FL (ref 79–97)
MONOCYTES # BLD AUTO: 1.8 X10E3/UL (ref 0.1–0.9)
MONOCYTES NFR BLD AUTO: 13 %
NEUTROPHILS # BLD AUTO: 10.6 X10E3/UL (ref 1.4–7)
NEUTROPHILS NFR BLD AUTO: 73 %
PLATELET # BLD AUTO: 364 X10E3/UL (ref 150–450)
RBC # BLD AUTO: 3.91 X10E6/UL (ref 4.14–5.8)
WBC # BLD AUTO: 14.5 X10E3/UL (ref 3.4–10.8)

## 2023-07-26 PROCEDURE — 3008F BODY MASS INDEX DOCD: CPT | Performed by: INTERNAL MEDICINE

## 2023-07-26 PROCEDURE — 99215 OFFICE O/P EST HI 40 MIN: CPT | Performed by: INTERNAL MEDICINE

## 2023-07-26 PROCEDURE — 3078F DIAST BP <80 MM HG: CPT | Performed by: INTERNAL MEDICINE

## 2023-07-26 PROCEDURE — 93000 ELECTROCARDIOGRAM COMPLETE: CPT | Performed by: INTERNAL MEDICINE

## 2023-07-26 PROCEDURE — 3074F SYST BP LT 130 MM HG: CPT | Performed by: INTERNAL MEDICINE

## 2023-07-26 RX ORDER — COLCHICINE 0.6 MG/1
0.6 TABLET ORAL DAILY
Qty: 30 TABLET | Refills: 1 | Status: SHIPPED | OUTPATIENT
Start: 2023-07-26 | End: 2023-08-17 | Stop reason: SDUPTHER

## 2023-07-26 ASSESSMENT — ENCOUNTER SYMPTOMS
ORTHOPNEA: 0
LIGHT-HEADEDNESS: 0
DYSPNEA ON EXERTION: 0
DIZZINESS: 0
HEADACHES: 0
NEAR-SYNCOPE: 0
PALPITATIONS: 0
SYNCOPE: 0
PND: 0
IRREGULAR HEARTBEAT: 0

## 2023-07-26 NOTE — TELEPHONE ENCOUNTER
Pt spouse calling     Is at the Radiology lab, need Xray script faxed     F:[phone]     Can be reached at [phone]

## 2023-07-26 NOTE — PROGRESS NOTES
Cardiology Note       Reason for visit: Coronary artery disease      Chidi returns today for follow-up.  He had undergone cardiac catheterization which showed severe LAD disease that 100% occluded with left-to-right collaterals and mild nonobstructive coronary disease in the remaining coronary tree.    Given his symptoms, nonsustained ventricular tachycardia on monitoring it was felt that vascular revisitation would be the best option.  He underwent robotic LIMA to the LAD on July 18.    Chidi now returns and tells me that he has been feeling fatigue, chronic cough and chest pain.  He had been taking a lot of Tylenol but not much relief.    Chidi has been able to do walks and things around the house but feels kind of punk.        Past Medical History:   Diagnosis Date   • Mixed hyperlipidemia 6/13/2023   • Palpitations 06/12/2023   • SOB (shortness of breath)      Past Surgical History:   Procedure Laterality Date   • APPENDECTOMY      2019   • CORONARY ARTERY BYPASS GRAFT  07/18/2023    robotic     Social History     Tobacco Use   • Smoking status: Never   • Smokeless tobacco: Never   Vaping Use   • Vaping Use: Never used   Substance Use Topics   • Alcohol use: Yes     Comment: socially   • Drug use: Never      Family History   Problem Relation Age of Onset   • Multiple sclerosis Biological Mother    • Hyperlipidemia Biological Father    • Hypertension Biological Father    • Diabetes Biological Father    • No Known Problems Biological Sister    • No Known Problems Biological Sister    • No Known Problems Biological Sister    • No Known Problems Biological Sister    • No Known Problems Biological Brother      Augmentin [amoxicillin-pot clavulanate]  Current Outpatient Medications   Medication Instructions   • acetylcysteine (NAC ORAL) 1,000 mg, oral, Daily   • ascorbic acid/multivit-min (EMERGEN-C ORAL) 1 packet, oral, Daily   • cholecalciferol (vitamin D3) 1,000 Units, oral, Daily   • clopidogreL (PLAVIX)  75 mg, oral, Daily   • colchicine (COLCRYS) 0.6 mg, oral, Daily   • metoprolol tartrate (LOPRESSOR) 12.5 mg, oral, 2 times daily   • mv-min/vit C/glut/lysine/hb124 (IMMUNE SUPPORT ORAL) 2 tablets, oral, Daily   • oxyCODONE (ROXICODONE) 5 mg, oral, Every 8 hours PRN   • vitamin B complex (B COMPLEX ORAL) 1 tablet, oral, Daily          Review of Systems   Constitutional:        Fatigue   Cardiovascular: Positive for chest pain. Negative for dyspnea on exertion, irregular heartbeat, leg swelling, near-syncope, orthopnea, palpitations, paroxysmal nocturnal dyspnea and syncope.   Neurological: Negative for dizziness, headaches and light-headedness.      Objective    Vitals:    07/26/23 0814   BP: 104/60 pulses paradoxus of 8   Pulse: 95   Resp: 20   SpO2: 96%      Wt Readings from Last 3 Encounters:   07/26/23 85.6 kg (188 lb 12.8 oz)   07/21/23 88.5 kg (195 lb 1.6 oz)   07/17/23 85.3 kg (188 lb)      Physical Exam  Constitutional:       Appearance: He is well-developed.   Neck:      Vascular: No carotid bruit or JVD.   Cardiovascular:      Rate and Rhythm: Normal rate and regular rhythm.      Pulses:           Carotid pulses are 2+ on the right side and 2+ on the left side.       Dorsalis pedis pulses are 2+ on the right side and 2+ on the left side.        Posterior tibial pulses are 2+ on the right side and 2+ on the left side.      Heart sounds: S1 normal and S2 normal. No murmur heard.     No friction rub. Gallop present. S4 sounds present.      Comments: No rub audible  Pulmonary:      Effort: Pulmonary effort is normal.      Breath sounds: Normal breath sounds.   Musculoskeletal:      Right lower leg: No edema.      Left lower leg: No edema.      Comments: Chest ecchymotic.Small hematoma under left breast.  Mild chest wall tenderness   Skin:     General: Skin is warm and dry.   Neurological:      Mental Status: He is alert.   Psychiatric:         Behavior: Behavior normal.                     Robotic CABG.   7/18/2023  Operative Procedures Performed  OP CAB MID/THORACOTOMY CAPO ELENA Dr.      Carotid Dopplers.  7/6/2023  IMPRESSION:  Minimal plaque within the internal carotid arteries, causing less than 50%  stenosis.      Transthoracic echocardiogram.  7/6/2023  • The left ventricular cavity size is normal with normal wall thickness and preserved systolic function. EF 55% by visual estimate. Difficult to assess regional wall motions despite use of Definity-Grossly normal wall motion. Normal diastolic function for age.  • Aortic valve cusps poorly visualized. The visualized portions of the aortic root and ascending aorta are normal in size.  No aortic coarctation.  No aortic insufficiency or stenosis.  • The mitral valve is normal. Trace mitral regurgitation.   • The left atrium is normal in size.   • Limited visualization but the right ventricle appears normal size with preserved systolic function.   • The pulmonic valve is not well seen.   • The tricuspid is normal in appearance. Trace tricuspid regurgitation with insufficient jet to estimate RVSP.    • Right atrium is not well seen.  • The IVC is small and collapses > 50% with inspiration consistent with normal right atrial pressures.  • No pericardial effusion.   • No prior study for comparison.         Left heart cath and coronary angiography.  7/6/2023  Coronary Angiogram/Left Heart Catheterization   1. 100% ostial LAD chronic total occlusion with left to left and right to left collaterals  2. Mild nonobstructive disease in the remaining coronary tree  3. LVEDP 13 mmHg       Exercise nuclear stress test.  6/19/2023.  • This is an abnormal exercise nuclear stress test.  • EKG with 2-2.5 mm upsloping ST depression in the inferolateral leads starting at 6:16 of exercise.  Lateral lead ST depression appears slightly more horizontal.  This persisted until approximately 2 minutes of recovery.  • No exercise-induced arrhythmia.  Good exercise  capacity.  • Perfusion images are technically difficult secondary to artifact.  • There is a moderate-sized area of moderately to severely reduced isotope uptake in the anteroseptum with mild reversibility at rest.  • There is a small area of moderately reduced isotope uptake in the apical septum with mild improvement at rest.  • The inferior wall has mildly reduced uptake on resting images only.  • LVEF 55%.  Grossly normal wall motion.  • Overall findings are suggestive of ischemia in the territory of the left anterior descending coronary artery.      EKG.  Normal sinus rhythm, RSR prime in V1 and nonspecific T wave abnormality.     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  I had a quick evaluation with echocardiography which showed a small to moderate posterior pericardial effusion.  There was no cardiac tamponade.    I have discontinued Chidi's rosuvastatin because when he went to the pharmacy they did not give him the colchicine because of the drug to drug interaction.    He will start the colchicine and I sent in a prescription.  I also told him he can take Motrin 600 mg 3 times a day with food.    I will have him get a chest x-ray, and blood work including a CBC and differential, CMP and sed rate.    His blood pressure is at the lower end of normal and I have discontinued his Lasix and potassium as well.    Coronary artery disease of native artery of native heart with stable angina pectoris (CMS/HCC)  Status post robotic LIMA to the LAD.  Chidi has a significant amount of ecchymoses on his chest and I will have him get a CBC and differential as well as a chest x-ray given his cough.    His EKG today in the office revealed normal sinus rhythm with RSR prime in V1 and nonspecific T wave abnormality.    I suspect that he has post pericardotomy syndrome and I will have him get a sed rate, CBC and differential and CMP.    I have discontinued his  rosuvastatin.  He will go on the colchicine 0.6 mg daily and start Motrin 600 mg 3 times a day with food.  He will also discontinue the aspirin he will remain on the Plavix.    Mixed hyperlipidemia  Counseled on low-fat low-cholesterol diet.  I will restart Chidi's rosuvastatin after he has completed the colchicine.    We had a prolonged discussion about these complex clinical issues and went over the various important aspects to consider. All questions were answered.      I reviewed all the findings with Chidi and his wife.  He will call with any questions or concerns.  I will call him with the results of his tests.            Thank you for allowing me to participate in the care of this patient.  If you have any questions please don't hesitate to contact me.    I spent 45 minutes on this date of service performing the following activities: obtaining history, performing examination, entering orders, documenting, preparing for visit, obtaining / reviewing records, providing counseling and education and communicating results.     Chidi Pham MD [facility]   7/26/2023  1:01 PM

## 2023-07-26 NOTE — ASSESSMENT & PLAN NOTE
Status post robotic LIMA to the LAD.  Chidi has a significant amount of ecchymoses on his chest and I will have him get a CBC and differential as well as a chest x-ray given his cough.    His EKG today in the office revealed normal sinus rhythm with RSR prime in V1 and nonspecific T wave abnormality.    I suspect that he has post pericardotomy syndrome and I will have him get a sed rate, CBC and differential and CMP.    I have discontinued his rosuvastatin.  He will go on the colchicine 0.6 mg daily and start Motrin 600 mg 3 times a day with food.  He will also discontinue the aspirin he will remain on the Plavix.

## 2023-07-26 NOTE — LETTER
July 26, 2023     Yennifer Oliveira, DO  [address] [address]  [facility] [address]    Patient: Chidi Ramsey  YOB: 1960  Date of Visit: 7/26/2023      Dear Dr. Oliveira:    Thank you for referring Chidi Ramsey to me for evaluation. Below are my notes for this consultation.    If you have questions, please do not hesitate to call me. I look forward to following your patient along with you.         Sincerely,        Chidi Pham MD        CC: No Recipients    Chidi Pham MD  7/26/2023  1:01 PM  Signed     Cardiology Note       Reason for visit: Coronary artery disease      Chidi returns today for follow-up.  He had undergone cardiac catheterization which showed severe LAD disease that 100% occluded with left-to-right collaterals and mild nonobstructive coronary disease in the remaining coronary tree.    Given his symptoms, nonsustained ventricular tachycardia on monitoring it was felt that vascular revisitation would be the best option.  He underwent robotic LIMA to the LAD on July 18.    Chidi now returns and tells me that he has been feeling fatigue, chronic cough and chest pain.  He had been taking a lot of Tylenol but not much relief.    Chidi has been able to do walks and things around the house but feels kind of punk.        Past Medical History:   Diagnosis Date   • Mixed hyperlipidemia 6/13/2023   • Palpitations 06/12/2023   • SOB (shortness of breath)      Past Surgical History:   Procedure Laterality Date   • APPENDECTOMY      2019   • CORONARY ARTERY BYPASS GRAFT  07/18/2023    robotic     Social History     Tobacco Use   • Smoking status: Never   • Smokeless tobacco: Never   Vaping Use   • Vaping Use: Never used   Substance Use Topics   • Alcohol use: Yes     Comment: socially   • Drug use: Never      Family History   Problem Relation Age of Onset   • Multiple sclerosis Biological Mother    • Hyperlipidemia Biological Father    • Hypertension Biological  Father    • Diabetes Biological Father    • No Known Problems Biological Sister    • No Known Problems Biological Sister    • No Known Problems Biological Sister    • No Known Problems Biological Sister    • No Known Problems Biological Brother      Augmentin [amoxicillin-pot clavulanate]  Current Outpatient Medications   Medication Instructions   • acetylcysteine (NAC ORAL) 1,000 mg, oral, Daily   • ascorbic acid/multivit-min (EMERGEN-C ORAL) 1 packet, oral, Daily   • cholecalciferol (vitamin D3) 1,000 Units, oral, Daily   • clopidogreL (PLAVIX) 75 mg, oral, Daily   • colchicine (COLCRYS) 0.6 mg, oral, Daily   • metoprolol tartrate (LOPRESSOR) 12.5 mg, oral, 2 times daily   • mv-min/vit C/glut/lysine/hb124 (IMMUNE SUPPORT ORAL) 2 tablets, oral, Daily   • oxyCODONE (ROXICODONE) 5 mg, oral, Every 8 hours PRN   • vitamin B complex (B COMPLEX ORAL) 1 tablet, oral, Daily          Review of Systems   Constitutional:        Fatigue   Cardiovascular: Positive for chest pain. Negative for dyspnea on exertion, irregular heartbeat, leg swelling, near-syncope, orthopnea, palpitations, paroxysmal nocturnal dyspnea and syncope.   Neurological: Negative for dizziness, headaches and light-headedness.      Objective    Vitals:    07/26/23 0814   BP: 104/60 pulses paradoxus of 8   Pulse: 95   Resp: 20   SpO2: 96%      Wt Readings from Last 3 Encounters:   07/26/23 85.6 kg (188 lb 12.8 oz)   07/21/23 88.5 kg (195 lb 1.6 oz)   07/17/23 85.3 kg (188 lb)      Physical Exam  Constitutional:       Appearance: He is well-developed.   Neck:      Vascular: No carotid bruit or JVD.   Cardiovascular:      Rate and Rhythm: Normal rate and regular rhythm.      Pulses:           Carotid pulses are 2+ on the right side and 2+ on the left side.       Dorsalis pedis pulses are 2+ on the right side and 2+ on the left side.        Posterior tibial pulses are 2+ on the right side and 2+ on the left side.      Heart sounds: S1 normal and S2 normal. No  murmur heard.     No friction rub. Gallop present. S4 sounds present.      Comments: No rub audible  Pulmonary:      Effort: Pulmonary effort is normal.      Breath sounds: Normal breath sounds.   Musculoskeletal:      Right lower leg: No edema.      Left lower leg: No edema.      Comments: Chest ecchymotic.Small hematoma under left breast.  Mild chest wall tenderness   Skin:     General: Skin is warm and dry.   Neurological:      Mental Status: He is alert.   Psychiatric:         Behavior: Behavior normal.                     Robotic CABG.  7/18/2023  Operative Procedures Performed  OP CAB MID/THORACOTOMY CAPO ELENA Dr.      Carotid Dopplers.  7/6/2023  IMPRESSION:  Minimal plaque within the internal carotid arteries, causing less than 50%  stenosis.      Transthoracic echocardiogram.  7/6/2023  • The left ventricular cavity size is normal with normal wall thickness and preserved systolic function. EF 55% by visual estimate. Difficult to assess regional wall motions despite use of Definity-Grossly normal wall motion. Normal diastolic function for age.  • Aortic valve cusps poorly visualized. The visualized portions of the aortic root and ascending aorta are normal in size.  No aortic coarctation.  No aortic insufficiency or stenosis.  • The mitral valve is normal. Trace mitral regurgitation.   • The left atrium is normal in size.   • Limited visualization but the right ventricle appears normal size with preserved systolic function.   • The pulmonic valve is not well seen.   • The tricuspid is normal in appearance. Trace tricuspid regurgitation with insufficient jet to estimate RVSP.    • Right atrium is not well seen.  • The IVC is small and collapses > 50% with inspiration consistent with normal right atrial pressures.  • No pericardial effusion.   • No prior study for comparison.         Left heart cath and coronary angiography.  7/6/2023  Coronary Angiogram/Left Heart Catheterization   1. 100%  ostial LAD chronic total occlusion with left to left and right to left collaterals  2. Mild nonobstructive disease in the remaining coronary tree  3. LVEDP 13 mmHg       Exercise nuclear stress test.  6/19/2023.  • This is an abnormal exercise nuclear stress test.  • EKG with 2-2.5 mm upsloping ST depression in the inferolateral leads starting at 6:16 of exercise.  Lateral lead ST depression appears slightly more horizontal.  This persisted until approximately 2 minutes of recovery.  • No exercise-induced arrhythmia.  Good exercise capacity.  • Perfusion images are technically difficult secondary to artifact.  • There is a moderate-sized area of moderately to severely reduced isotope uptake in the anteroseptum with mild reversibility at rest.  • There is a small area of moderately reduced isotope uptake in the apical septum with mild improvement at rest.  • The inferior wall has mildly reduced uptake on resting images only.  • LVEF 55%.  Grossly normal wall motion.  • Overall findings are suggestive of ischemia in the territory of the left anterior descending coronary artery.      EKG.  Normal sinus rhythm, RSR prime in V1 and nonspecific T wave abnormality.     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  I had a quick evaluation with echocardiography which showed a small to moderate posterior pericardial effusion.  There was no cardiac tamponade.    I have discontinued hCidi's rosuvastatin because when he went to the pharmacy they did not give him the colchicine because of the drug to drug interaction.    He will start the colchicine and I sent in a prescription.  I also told him he can take Motrin 600 mg 3 times a day with food.    I will have him get a chest x-ray, and blood work including a CBC and differential, CMP and sed rate.    His blood pressure is at the lower end of normal and I have discontinued his Lasix and potassium as well.    Coronary  artery disease of native artery of native heart with stable angina pectoris (CMS/HCC)  Status post robotic LIMA to the LAD.  Chidi has a significant amount of ecchymoses on his chest and I will have him get a CBC and differential as well as a chest x-ray given his cough.    His EKG today in the office revealed normal sinus rhythm with RSR prime in V1 and nonspecific T wave abnormality.    I suspect that he has post pericardotomy syndrome and I will have him get a sed rate, CBC and differential and CMP.    I have discontinued his rosuvastatin.  He will go on the colchicine 0.6 mg daily and start Motrin 600 mg 3 times a day with food.  He will also discontinue the aspirin he will remain on the Plavix.    Mixed hyperlipidemia  Counseled on low-fat low-cholesterol diet.  I will restart Chidi's rosuvastatin after he has completed the colchicine.    We had a prolonged discussion about these complex clinical issues and went over the various important aspects to consider. All questions were answered.      I reviewed all the findings with Chidi and his wife.  He will call with any questions or concerns.  I will call him with the results of his tests.           Thank you for allowing me to participate in the care of this patient.  If you have any questions please don't hesitate to contact me.    I spent 45 minutes on this date of service performing the following activities: obtaining history, performing examination, entering orders, documenting, preparing for visit, obtaining / reviewing records, providing counseling and education and communicating results.     Chidi Pham MD [facility]   7/26/2023  1:01 PM

## 2023-07-26 NOTE — TELEPHONE ENCOUNTER
Haley from [facility] faxed a report to Dr Garcia this morning.    Did you receive this?    Please call [phone]

## 2023-07-26 NOTE — PATIENT INSTRUCTIONS
Patient Education   Mediterranean Diet  A Mediterranean diet refers to food and lifestyle choices that are based on the traditions of countries located on the [place]. It focuses on eating more fruits, vegetables, whole grains, beans, nuts, seeds, and heart-healthy fats, and eating less dairy, meat, eggs, and processed foods with added sugar, salt, and fat. This way of eating has been shown to help prevent certain conditions and improve outcomes for people who have chronic diseases, like kidney disease and heart disease.  What are tips for following this plan?  Reading food labels  Check the serving size of packaged foods. For foods such as rice and pasta, the serving size refers to the amount of cooked product, not dry.  Check the total fat in packaged foods. Avoid foods that have saturated fat or trans fats.  Check the ingredient list for added sugars, such as corn syrup.  Shopping    Buy a variety of foods that offer a balanced diet, including:  Fresh fruits and vegetables (produce).  Grains, beans, nuts, and seeds. Some of these may be available in unpackaged forms or large amounts (in bulk).  Fresh seafood.  Poultry and eggs.  Low-fat dairy products.  Buy whole ingredients instead of prepackaged foods.  Buy fresh fruits and vegetables in-season from local [place] markets.  Buy plain frozen fruits and vegetables.  If you do not have access to quality fresh seafood, buy precooked frozen shrimp or canned fish, such as tuna, salmon, or sardines.  Stock your pantry so you always have certain foods on hand, such as olive oil, canned tuna, canned tomatoes, rice, pasta, and beans.  Cooking  Cook foods with extra-virgin olive oil instead of using butter or other vegetable oils.  Have meat as a side dish, and have vegetables or grains as your main dish. This means having meat in small portions or adding small amounts of meat to foods like pasta or stew.  Use beans or vegetables instead of meat in common dishes  like chili or lasagna.  [place] with different cooking methods. Try roasting, broiling, steaming, and sautéing vegetables.  Add frozen vegetables to soups, stews, pasta, or rice.  Add nuts or seeds for added healthy fats and plant protein at each meal. You can add these to yogurt, salads, or vegetable dishes.  Marinate fish or vegetables using olive oil, lemon juice, garlic, and fresh herbs.  Meal planning  Plan to eat one vegetarian meal one day each week. Try to work up to two vegetarian meals, if possible.  Eat seafood two or more times a week.  Have healthy snacks readily available, such as:  Vegetable sticks with hummus.  Greek yogurt.  Fruit and nut trail mix.  Eat balanced meals throughout the week. This includes:  Fruit: 2-3 servings a day.  Vegetables: 4-5 servings a day.  Low-fat dairy: 2 servings a day.  Fish, poultry, or lean meat: 1 serving a day.  Beans and legumes: 2 or more servings a week.  Nuts and seeds: 1-2 servings a day.  Whole grains: 6-8 servings a day.  Extra-virgin olive oil: 3-4 servings a day.  Limit red meat and sweets to only a few servings a month.  Lifestyle    Cook and eat meals together with your family, when possible.  Drink enough fluid to keep your urine pale yellow.  Be physically active every day. This includes:  Aerobic exercise like running or swimming.  Leisure activities like gardening, walking, or housework.  Get 7-8 hours of sleep each night.  If recommended by your health care provider, drink red wine in moderation. This means 1 glass a day for nonpregnant women and 2 glasses a day for men. A glass of wine equals 5 oz (150 mL).  What foods should I eat?  Fruits  Apples. Apricots. Avocado. Berries. Bananas. Cherries. Dates. Figs. Grapes. Rosey. Melon. Oranges. Peaches. Plums. Pomegranate.  Vegetables  Artichokes. Beets. Broccoli. Cabbage. Carrots. Eggplant. Green beans. Chard. Kale. Spinach. Onions. Leeks. Peas. Squash. Tomatoes. Peppers.  Radishes.  Grains  Whole-grain pasta. Brown rice. Bulgur wheat. Polenta. Couscous. Whole-wheat bread. Oatmeal. Quinoa.  Meats and other proteins  Beans. Almonds. Sunflower seeds. Pine nuts. Peanuts. Cod. [place]. Scallops. Shrimp. Tuna. Tilapia. Clams. Oysters. Eggs. Poultry without skin.  Dairy  Low-fat milk. Cheese. Greek yogurt.  Fats and oils  Extra-virgin olive oil. Avocado oil. Grapeseed oil.  Beverages  Water. Red wine. Herbal tea.  Sweets and desserts  Greek yogurt with honey. Baked apples. Poached pears. Trail mix.  Seasonings and condiments  Bas[place]. Cilantro. Coriander. Cumin. Mint. Parsley. Celso. Rosemary. Tarragon. Garlic. Oregano. Thyme. Pepper. Balsamic vinegar. Tahini. Hummus. Tomato sauce. Olives. Mushrooms.  The items listed above may not be a complete list of foods and beverages you can eat. Contact a dietitian for more information.  What foods should I limit?  This is a list of foods that should be eaten rarely or only on special occasions.  Fruits  Fruit canned in syrup.  Vegetables  Deep-fried potatoes (french fries).  Grains  Prepackaged pasta or rice dishes. Prepackaged cereal with added sugar. Prepackaged snacks with added sugar.  Meats and other proteins  Beef. Pork. Lamb. Poultry with skin. Hot dogs. [place].  Dairy  Ice cream. Sour cream. Whole milk.  Fats and oils  Butter. Canola oil. Vegetable oil. Beef fat (tallow). Lard.  Beverages  Juice. Sugar-sweetened soft drinks. Beer. Liquor and spirits.  Sweets and desserts  Cookies. Cakes. Pies. Candy.  Seasonings and condiments  Mayonnaise. Pre-made sauces and marinades.  The items listed above may not be a complete list of foods and beverages you should limit. Contact a dietitian for more information.  Summary  The Mediterranean diet includes both food and lifestyle choices.  Eat a variety of fresh fruits and vegetables, beans, nuts, seeds, and whole grains.  Limit the amount of red meat and sweets that you eat.  If recommended by your health  care provider, drink red wine in moderation. This means 1 glass a day for nonpregnant women and 2 glasses a day for men. A glass of wine equals 5 oz (150 mL).  This information is not intended to replace advice given to you by your health care provider. Make sure you discuss any questions you have with your health care provider.  Document Revised: 01/22/2021 Document Reviewed: 11/19/2020  Elsevier Patient Education © 2023 [employer]

## 2023-07-26 NOTE — ASSESSMENT & PLAN NOTE
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  I had a quick evaluation with echocardiography which showed a small to moderate posterior pericardial effusion.  There was no cardiac tamponade.    I have discontinued Chidi's rosuvastatin because when he went to the pharmacy they did not give him the colchicine because of the drug to drug interaction.    He will start the colchicine and I sent in a prescription.  I also told him he can take Motrin 600 mg 3 times a day with food.    I will have him get a chest x-ray, and blood work including a CBC and differential, CMP and sed rate.    His blood pressure is at the lower end of normal and I have discontinued his Lasix and potassium as well.

## 2023-07-26 NOTE — ASSESSMENT & PLAN NOTE
Counseled on low-fat low-cholesterol diet.  I will restart Chidi's rosuvastatin after he has completed the colchicine.

## 2023-07-26 NOTE — TELEPHONE ENCOUNTER
MYRON    [facility] called, patient has a chest xray today and the results can be seen in Care Everywhere.

## 2023-07-27 LAB
ALBUMIN SERPL-MCNC: 4.5 G/DL (ref 3.9–4.9)
ALBUMIN/GLOB SERPL: 2.3 {RATIO} (ref 1.2–2.2)
ALP SERPL-CCNC: 93 IU/L (ref 44–121)
ALT SERPL-CCNC: 52 IU/L (ref 0–44)
AST SERPL-CCNC: 35 IU/L (ref 0–40)
BILIRUB SERPL-MCNC: 1 MG/DL (ref 0–1.2)
BUN SERPL-MCNC: 21 MG/DL (ref 8–27)
BUN/CREAT SERPL: 22 (ref 10–24)
CALCIUM SERPL-MCNC: 9.3 MG/DL (ref 8.6–10.2)
CHLORIDE SERPL-SCNC: 95 MMOL/L (ref 96–106)
CO2 SERPL-SCNC: 23 MMOL/L (ref 20–29)
CREAT SERPL-MCNC: 0.96 MG/DL (ref 0.76–1.27)
EGFRCR SERPLBLD CKD-EPI 2021: 89 ML/MIN/1.73
ERYTHROCYTE [SEDIMENTATION RATE] IN BLOOD BY WESTERGREN METHOD: 3 MM/HR (ref 0–30)
GLOBULIN SER CALC-MCNC: 2 G/DL (ref 1.5–4.5)
GLUCOSE SERPL-MCNC: 115 MG/DL (ref 70–99)
POTASSIUM SERPL-SCNC: 4.8 MMOL/L (ref 3.5–5.2)
PROT SERPL-MCNC: 6.5 G/DL (ref 6–8.5)
SODIUM SERPL-SCNC: 133 MMOL/L (ref 134–144)
TSH SERPL DL<=0.005 MIU/L-ACNC: 3.46 UIU/ML (ref 0.45–4.5)

## 2023-08-01 RX ORDER — METOPROLOL TARTRATE 25 MG/1
12.5 TABLET, FILM COATED ORAL NIGHTLY
Qty: 30 TABLET | Refills: 3 | COMMUNITY
Start: 2023-08-01 | End: 2023-08-08

## 2023-08-03 NOTE — PROGRESS NOTES
x   Cardiology Note       Reason for visit: Coronary artery disease.      Chidi returns today for follow-up accompanied by his wife.  He continues to feel better.  He still has some intermittent cough.  He is currently taking 400 mg of Motrin 3 times a day.  He will try to decrease it to 400 mg twice a day.  He will continue on the colchicine.  There has not been any shortness of breath.  He does have some chest tightness in the area of incision but no anginal symptoms.    He has been walking regularly up to 30 minutes without any difficulty.        Past Medical History:   Diagnosis Date   • Mixed hyperlipidemia 6/13/2023   • Palpitations 06/12/2023   • SOB (shortness of breath)      Past Surgical History:   Procedure Laterality Date   • APPENDECTOMY      2019   • CORONARY ARTERY BYPASS GRAFT  07/18/2023    robotic     Social History     Tobacco Use   • Smoking status: Never   • Smokeless tobacco: Never   Vaping Use   • Vaping Use: Never used   Substance Use Topics   • Alcohol use: Yes     Comment: socially   • Drug use: Never      Family History   Problem Relation Age of Onset   • Multiple sclerosis Biological Mother    • Hyperlipidemia Biological Father    • Hypertension Biological Father    • Diabetes Biological Father    • No Known Problems Biological Sister    • No Known Problems Biological Sister    • No Known Problems Biological Sister    • No Known Problems Biological Sister    • No Known Problems Biological Brother      Augmentin [amoxicillin-pot clavulanate]  Current Outpatient Medications   Medication Instructions   • acetylcysteine (NAC ORAL) 1,000 mg, oral, Daily   • ascorbic acid/multivit-min (EMERGEN-C ORAL) 1 packet, oral, Daily   • cholecalciferol (vitamin D3) 1,000 Units, oral, Daily   • clopidogreL (PLAVIX) 75 mg, oral, Daily   • colchicine (COLCRYS) 0.6 mg, oral, Daily   • ibuprofen (MOTRIN) 400 mg, oral, 3 times daily   • mv-min/vit C/glut/lysine/hb124 (IMMUNE SUPPORT ORAL) 2 tablets, oral,  Daily   • vitamin B complex (B COMPLEX ORAL) 1 tablet, oral, Daily          Review of Systems   Eyes: Negative for visual disturbance.   Cardiovascular: Negative for chest pain, dyspnea on exertion, irregular heartbeat, leg swelling, near-syncope, orthopnea, palpitations, paroxysmal nocturnal dyspnea and syncope.   Respiratory: Positive for cough. Negative for shortness of breath.    Neurological: Negative for dizziness and light-headedness.      Objective    Vitals:    08/08/23 1034   BP: 106/60   Pulse: 83   Resp: 20      Wt Readings from Last 3 Encounters:   08/08/23 83.9 kg (185 lb)   07/26/23 85.6 kg (188 lb 12.8 oz)   07/21/23 88.5 kg (195 lb 1.6 oz)      Physical Exam  Constitutional:       Appearance: He is well-developed.   Neck:      Vascular: No carotid bruit or JVD.   Cardiovascular:      Rate and Rhythm: Normal rate and regular rhythm.      Pulses:           Carotid pulses are 2+ on the right side and 2+ on the left side.       Dorsalis pedis pulses are 2+ on the right side and 2+ on the left side.        Posterior tibial pulses are 2+ on the right side and 2+ on the left side.      Heart sounds: S1 normal and S2 normal. No murmur heard.     No friction rub. No gallop.      Comments: No rub audible.  Pulmonary:      Effort: Pulmonary effort is normal.      Breath sounds: Normal breath sounds.   Musculoskeletal:      Right lower leg: No edema.      Left lower leg: No edema.      Comments: Resolving ecchymoses on the chest wall.  Over left breast still some scar tissue nontender.  Incisions are clean and dry and well-healed.   Skin:     General: Skin is warm and dry.   Neurological:      Mental Status: He is alert.   Psychiatric:         Behavior: Behavior normal.                       Robotic CABG.  7/18/2023  Operative Procedures Performed  OP CAB MID/THORACOTOMY CAPO ELENA Dr.        Carotid Dopplers.  7/6/2023  IMPRESSION:  Minimal plaque within the internal carotid arteries, causing  less than 50%  stenosis.        Transthoracic echocardiogram.  7/6/2023  • The left ventricular cavity size is normal with normal wall thickness and preserved systolic function. EF 55% by visual estimate. Difficult to assess regional wall motions despite use of Definity-Grossly normal wall motion. Normal diastolic function for age.  • Aortic valve cusps poorly visualized. The visualized portions of the aortic root and ascending aorta are normal in size.  No aortic coarctation.  No aortic insufficiency or stenosis.  • The mitral valve is normal. Trace mitral regurgitation.   • The left atrium is normal in size.   • Limited visualization but the right ventricle appears normal size with preserved systolic function.   • The pulmonic valve is not well seen.   • The tricuspid is normal in appearance. Trace tricuspid regurgitation with insufficient jet to estimate RVSP.    • Right atrium is not well seen.  • The IVC is small and collapses > 50% with inspiration consistent with normal right atrial pressures.  • No pericardial effusion.   • No prior study for comparison.           Left heart cath and coronary angiography.  7/6/2023  Coronary Angiogram/Left Heart Catheterization   1. 100% ostial LAD chronic total occlusion with left to left and right to left collaterals  2. Mild nonobstructive disease in the remaining coronary tree  3. LVEDP 13 mmHg        Exercise nuclear stress test.  6/19/2023.  • This is an abnormal exercise nuclear stress test.  • EKG with 2-2.5 mm upsloping ST depression in the inferolateral leads starting at 6:16 of exercise.  Lateral lead ST depression appears slightly more horizontal.  This persisted until approximately 2 minutes of recovery.  • No exercise-induced arrhythmia.  Good exercise capacity.  • Perfusion images are technically difficult secondary to artifact.  • There is a moderate-sized area of moderately to severely reduced isotope uptake in the anteroseptum with mild reversibility at  rest.  • There is a small area of moderately reduced isotope uptake in the apical septum with mild improvement at rest.  • The inferior wall has mildly reduced uptake on resting images only.  • LVEF 55%.  Grossly normal wall motion.  • Overall findings are suggestive of ischemia in the territory of the left anterior descending coronary artery.     ECG.  Normal sinus rhythm, RSR prime in V1.  Diffuse ST-T wave abnormality.     Assessment/Plan    Post pericardiotomy syndrome  Chidi will continue on the ibuprofen 400 mg and try to decrease it to twice a day.  He will continue on the colchicine.    His EKG shows ST-T wave abnormalities that are more pronounced and are often seen post procedure or with pericarditis.  No anginal symptoms.  His exercise tolerance has improved.    I will have Chidi get a follow-up echocardiogram.    He has an appointment to see Dr. Kaba on the 28th.    I have discontinued his metoprolol as his blood pressures at the lower end of normal and he does have some dizziness when he bends over and then stands up.  He also has had some fatigue which this may be contributing to.    Coronary artery disease of native artery of native heart with stable angina pectoris (CMS/HCC)  No anginal symptoms.  hCidi will continue on the Plavix.  I had held his aspirin given that he is on the high dose ibuprofen.    He has a follow-up appointment to see Dr. Kaba on the 28th.  He will continue with the colchicine.    Mixed hyperlipidemia  Counseled on a low-fat low-cholesterol diet.  I will reinstitute the rosuvastatin.  I encouraged him to continue his walking and exercise program.    We had a prolonged discussion about these complex clinical issues and went over the various important aspects to consider. All questions were answered.      Chidi will be coming due for his day-to-day care.  I have asked him to return to see me in 1 month's time or sooner if there is a problem.  I will continue to keep  you informed of his progress.            Thank you for allowing me to participate in the care of this patient.  If you have any questions please don't hesitate to contact me.    I spent 30 minutes on this date of service performing the following activities: obtaining history, performing examination, entering orders, documenting, preparing for visit, obtaining / reviewing records, providing counseling and education and communicating results.     Chidi Pham MD [facility]   8/8/2023  11:15 AM

## 2023-08-03 NOTE — PATIENT INSTRUCTIONS
Patient Education   Mediterranean Diet  A Mediterranean diet refers to food and lifestyle choices that are based on the traditions of countries located on the [place]. It focuses on eating more fruits, vegetables, whole grains, beans, nuts, seeds, and heart-healthy fats, and eating less dairy, meat, eggs, and processed foods with added sugar, salt, and fat. This way of eating has been shown to help prevent certain conditions and improve outcomes for people who have chronic diseases, like kidney disease and heart disease.  What are tips for following this plan?  Reading food labels  Check the serving size of packaged foods. For foods such as rice and pasta, the serving size refers to the amount of cooked product, not dry.  Check the total fat in packaged foods. Avoid foods that have saturated fat or trans fats.  Check the ingredient list for added sugars, such as corn syrup.  Shopping    Buy a variety of foods that offer a balanced diet, including:  Fresh fruits and vegetables (produce).  Grains, beans, nuts, and seeds. Some of these may be available in unpackaged forms or large amounts (in bulk).  Fresh seafood.  Poultry and eggs.  Low-fat dairy products.  Buy whole ingredients instead of prepackaged foods.  Buy fresh fruits and vegetables in-season from local [employer] markets.  Buy plain frozen fruits and vegetables.  If you do not have access to quality fresh seafood, buy precooked frozen shrimp or canned fish, such as tuna, salmon, or sardines.  Stock your pantry so you always have certain foods on hand, such as olive oil, canned tuna, canned tomatoes, rice, pasta, and beans.  Cooking  Cook foods with extra-virgin olive oil instead of using butter or other vegetable oils.  Have meat as a side dish, and have vegetables or grains as your main dish. This means having meat in small portions or adding small amounts of meat to foods like pasta or stew.  Use beans or vegetables instead of meat in common dishes  like chili or lasagna.  [place] with different cooking methods. Try roasting, broiling, steaming, and sautéing vegetables.  Add frozen vegetables to soups, stews, pasta, or rice.  Add nuts or seeds for added healthy fats and plant protein at each meal. You can add these to yogurt, salads, or vegetable dishes.  Marinate fish or vegetables using olive oil, lemon juice, garlic, and fresh herbs.  Meal planning  Plan to eat one vegetarian meal one day each week. Try to work up to two vegetarian meals, if possible.  Eat seafood two or more times a week.  Have healthy snacks readily available, such as:  Vegetable sticks with hummus.  Greek yogurt.  Fruit and nut trail mix.  Eat balanced meals throughout the week. This includes:  Fruit: 2-3 servings a day.  Vegetables: 4-5 servings a day.  Low-fat dairy: 2 servings a day.  Fish, poultry, or lean meat: 1 serving a day.  Beans and legumes: 2 or more servings a week.  Nuts and seeds: 1-2 servings a day.  Whole grains: 6-8 servings a day.  Extra-virgin olive oil: 3-4 servings a day.  Limit red meat and sweets to only a few servings a month.  Lifestyle    Cook and eat meals together with your family, when possible.  Drink enough fluid to keep your urine pale yellow.  Be physically active every day. This includes:  Aerobic exercise like running or swimming.  Leisure activities like gardening, walking, or housework.  Get 7-8 hours of sleep each night.  If recommended by your health care provider, drink red wine in moderation. This means 1 glass a day for nonpregnant women and 2 glasses a day for men. A glass of wine equals 5 oz (150 mL).  What foods should I eat?  Fruits  Apples. Apricots. Avocado. Berries. Bananas. Cherries. Dates. Figs. Grapes. Rosey. Melon. Oranges. Peaches. Plums. Pomegranate.  Vegetables  Artichokes. Beets. Broccoli. Cabbage. Carrots. Eggplant. Green beans. Chard. Kale. Spinach. Onions. Leeks. Peas. Squash. Tomatoes. Peppers.  Radishes.  Grains  Whole-grain pasta. Brown rice. Bulgur wheat. Polenta. Couscous. Whole-wheat bread. Oatmeal. Quinoa.  Meats and other proteins  Beans. Almonds. Sunflower seeds. Pine nuts. Peanuts. Cod. [place]. Scallops. Shrimp. Tuna. Tilapia. Clams. Oysters. Eggs. Poultry without skin.  Dairy  Low-fat milk. Cheese. Greek yogurt.  Fats and oils  Extra-virgin olive oil. Avocado oil. Grapeseed oil.  Beverages  Water. Red wine. Herbal tea.  Sweets and desserts  Greek yogurt with honey. Baked apples. Poached pears. Trail mix.  Seasonings and condiments  Basil. Cilantro. Coriander. Cumin. Mint. Parsley. Celso. Rosemary. Tarragon. Garlic. Oregano. Thyme. Pepper. Balsamic vinegar. Tahini. Hummus. Tomato sauce. Olives. Mushrooms.  The items listed above may not be a complete list of foods and beverages you can eat. Contact a dietitian for more information.  What foods should I limit?  This is a list of foods that should be eaten rarely or only on special occasions.  Fruits  Fruit canned in syrup.  Vegetables  Deep-fried potatoes (french fries).  Grains  Prepackaged pasta or rice dishes. Prepackaged cereal with added sugar. Prepackaged snacks with added sugar.  Meats and other proteins  Beef. Pork. Lamb. Poultry with skin. Hot dogs. [place].  Dairy  Ice cream. Sour cream. Whole milk.  Fats and oils  Butter. Canola oil. Vegetable oil. Beef fat (tallow). Lard.  Beverages  Juice. Sugar-sweetened soft drinks. Beer. Liquor and spirits.  Sweets and desserts  Cookies. Cakes. Pies. Candy.  Seasonings and condiments  Mayonnaise. Pre-made sauces and marinades.  The items listed above may not be a complete list of foods and beverages you should limit. Contact a dietitian for more information.  Summary  The Mediterranean diet includes both food and lifestyle choices.  Eat a variety of fresh fruits and vegetables, beans, nuts, seeds, and whole grains.  Limit the amount of red meat and sweets that you eat.  If recommended by your health  care provider, drink red wine in moderation. This means 1 glass a day for nonpregnant women and 2 glasses a day for men. A glass of wine equals 5 oz (150 mL).  This information is not intended to replace advice given to you by your health care provider. Make sure you discuss any questions you have with your health care provider.  Document Revised: 01/22/2021 Document Reviewed: 11/19/2020  Elsevier Patient Education © 2023 [employer] Inc[employer]

## 2023-08-08 ENCOUNTER — TELEPHONE (OUTPATIENT)
Dept: CARDIOLOGY | Facility: CLINIC | Age: 63
End: 2023-08-08

## 2023-08-08 ENCOUNTER — OFFICE VISIT (OUTPATIENT)
Dept: CARDIOLOGY | Facility: CLINIC | Age: 63
End: 2023-08-08
Payer: COMMERCIAL

## 2023-08-08 VITALS
HEIGHT: 70 IN | HEART RATE: 83 BPM | DIASTOLIC BLOOD PRESSURE: 60 MMHG | SYSTOLIC BLOOD PRESSURE: 106 MMHG | WEIGHT: 185 LBS | RESPIRATION RATE: 20 BRPM | BODY MASS INDEX: 26.48 KG/M2

## 2023-08-08 DIAGNOSIS — E78.2 MIXED HYPERLIPIDEMIA: ICD-10-CM

## 2023-08-08 DIAGNOSIS — I97.0 POST PERICARDIOTOMY SYNDROME: Primary | ICD-10-CM

## 2023-08-08 DIAGNOSIS — I25.118 CORONARY ARTERY DISEASE OF NATIVE ARTERY OF NATIVE HEART WITH STABLE ANGINA PECTORIS (CMS/HCC): ICD-10-CM

## 2023-08-08 PROCEDURE — 99214 OFFICE O/P EST MOD 30 MIN: CPT | Performed by: INTERNAL MEDICINE

## 2023-08-08 PROCEDURE — 3078F DIAST BP <80 MM HG: CPT | Performed by: INTERNAL MEDICINE

## 2023-08-08 PROCEDURE — 93000 ELECTROCARDIOGRAM COMPLETE: CPT | Performed by: INTERNAL MEDICINE

## 2023-08-08 PROCEDURE — 3008F BODY MASS INDEX DOCD: CPT | Performed by: INTERNAL MEDICINE

## 2023-08-08 PROCEDURE — 3074F SYST BP LT 130 MM HG: CPT | Performed by: INTERNAL MEDICINE

## 2023-08-08 RX ORDER — IBUPROFEN 200 MG
400 TABLET ORAL 3 TIMES DAILY
COMMUNITY
End: 2023-10-13

## 2023-08-08 ASSESSMENT — ENCOUNTER SYMPTOMS
DYSPNEA ON EXERTION: 0
LIGHT-HEADEDNESS: 0
SYNCOPE: 0
DIZZINESS: 0
COUGH: 1
ORTHOPNEA: 0
PND: 0
SHORTNESS OF BREATH: 0
IRREGULAR HEARTBEAT: 0
PALPITATIONS: 0
NEAR-SYNCOPE: 0

## 2023-08-08 NOTE — TELEPHONE ENCOUNTER
Pls precert for echo at [facility] on Tues the 15th of Aug; [employer].   Thx   69

## 2023-08-08 NOTE — ASSESSMENT & PLAN NOTE
Counseled on a low-fat low-cholesterol diet.  I will reinstitute the rosuvastatin.  I encouraged him to continue his walking and exercise program.

## 2023-08-08 NOTE — LETTER
August 8, 2023     Yennifer Oliveira, DO  [address]  [place] [address]    Patient: Chidi Ramsey  YOB: 1960  Date of Visit: 8/8/2023      Dear Dr. Oliveira:    Thank you for referring Chidi Ramsey to me for evaluation. Below are my notes for this consultation.    If you have questions, please do not hesitate to call me. I look forward to following your patient along with you.         Sincerely,        Chidi Pham MD        CC: No Recipients    Chidi Pham MD  8/8/2023 11:15 AM  Signed  x   Cardiology Note       Reason for visit: Coronary artery disease.      Chidi returns today for follow-up accompanied by his wife.  He continues to feel better.  He still has some intermittent cough.  He is currently taking 400 mg of Motrin 3 times a day.  He will try to decrease it to 400 mg twice a day.  He will continue on the colchicine.  There has not been any shortness of breath.  He does have some chest tightness in the area of incision but no anginal symptoms.    He has been walking regularly up to 30 minutes without any difficulty.        Past Medical History:   Diagnosis Date   • Mixed hyperlipidemia 6/13/2023   • Palpitations 06/12/2023   • SOB (shortness of breath)      Past Surgical History:   Procedure Laterality Date   • APPENDECTOMY      2019   • CORONARY ARTERY BYPASS GRAFT  07/18/2023    robotic     Social History     Tobacco Use   • Smoking status: Never   • Smokeless tobacco: Never   Vaping Use   • Vaping Use: Never used   Substance Use Topics   • Alcohol use: Yes     Comment: socially   • Drug use: Never      Family History   Problem Relation Age of Onset   • Multiple sclerosis Biological Mother    • Hyperlipidemia Biological Father    • Hypertension Biological Father    • Diabetes Biological Father    • No Known Problems Biological Sister    • No Known Problems Biological Sister    • No Known Problems Biological Sister    • No Known Problems  Biological Sister    • No Known Problems Biological Brother      Augmentin [amoxicillin-pot clavulanate]  Current Outpatient Medications   Medication Instructions   • acetylcysteine (NAC ORAL) 1,000 mg, oral, Daily   • ascorbic acid/multivit-min (EMERGEN-C ORAL) 1 packet, oral, Daily   • cholecalciferol (vitamin D3) 1,000 Units, oral, Daily   • clopidogreL (PLAVIX) 75 mg, oral, Daily   • colchicine (COLCRYS) 0.6 mg, oral, Daily   • ibuprofen (MOTRIN) 400 mg, oral, 3 times daily   • mv-min/vit C/glut/lysine/hb124 (IMMUNE SUPPORT ORAL) 2 tablets, oral, Daily   • vitamin B complex (B COMPLEX ORAL) 1 tablet, oral, Daily          Review of Systems   Eyes: Negative for visual disturbance.   Cardiovascular: Negative for chest pain, dyspnea on exertion, irregular heartbeat, leg swelling, near-syncope, orthopnea, palpitations, paroxysmal nocturnal dyspnea and syncope.   Respiratory: Positive for cough. Negative for shortness of breath.    Neurological: Negative for dizziness and light-headedness.      Objective    Vitals:    08/08/23 1034   BP: 106/60   Pulse: 83   Resp: 20      Wt Readings from Last 3 Encounters:   08/08/23 83.9 kg (185 lb)   07/26/23 85.6 kg (188 lb 12.8 oz)   07/21/23 88.5 kg (195 lb 1.6 oz)      Physical Exam  Constitutional:       Appearance: He is well-developed.   Neck:      Vascular: No carotid bruit or JVD.   Cardiovascular:      Rate and Rhythm: Normal rate and regular rhythm.      Pulses:           Carotid pulses are 2+ on the right side and 2+ on the left side.       Dorsalis pedis pulses are 2+ on the right side and 2+ on the left side.        Posterior tibial pulses are 2+ on the right side and 2+ on the left side.      Heart sounds: S1 normal and S2 normal. No murmur heard.     No friction rub. No gallop.      Comments: No rub audible.  Pulmonary:      Effort: Pulmonary effort is normal.      Breath sounds: Normal breath sounds.   Musculoskeletal:      Right lower leg: No edema.      Left  lower leg: No edema.      Comments: Resolving ecchymoses on the chest wall.  Over left breast still some scar tissue nontender.  Incisions are clean and dry and well-healed.   Skin:     General: Skin is warm and dry.   Neurological:      Mental Status: He is alert.   Psychiatric:         Behavior: Behavior normal.                       Robotic CABG.  7/18/2023  Operative Procedures Performed  OP CAB MID/THORACOTOMY CAPO ELENA Dr.        Carotid Dopplers.  7/6/2023  IMPRESSION:  Minimal plaque within the internal carotid arteries, causing less than 50%  stenosis.        Transthoracic echocardiogram.  7/6/2023  • The left ventricular cavity size is normal with normal wall thickness and preserved systolic function. EF 55% by visual estimate. Difficult to assess regional wall motions despite use of Definity-Grossly normal wall motion. Normal diastolic function for age.  • Aortic valve cusps poorly visualized. The visualized portions of the aortic root and ascending aorta are normal in size.  No aortic coarctation.  No aortic insufficiency or stenosis.  • The mitral valve is normal. Trace mitral regurgitation.   • The left atrium is normal in size.   • Limited visualization but the right ventricle appears normal size with preserved systolic function.   • The pulmonic valve is not well seen.   • The tricuspid is normal in appearance. Trace tricuspid regurgitation with insufficient jet to estimate RVSP.    • Right atrium is not well seen.  • The IVC is small and collapses > 50% with inspiration consistent with normal right atrial pressures.  • No pericardial effusion.   • No prior study for comparison.           Left heart cath and coronary angiography.  7/6/2023  Coronary Angiogram/Left Heart Catheterization   1. 100% ostial LAD chronic total occlusion with left to left and right to left collaterals  2. Mild nonobstructive disease in the remaining coronary tree  3. LVEDP 13 mmHg        Exercise nuclear  stress test.  6/19/2023.  • This is an abnormal exercise nuclear stress test.  • EKG with 2-2.5 mm upsloping ST depression in the inferolateral leads starting at 6:16 of exercise.  Lateral lead ST depression appears slightly more horizontal.  This persisted until approximately 2 minutes of recovery.  • No exercise-induced arrhythmia.  Good exercise capacity.  • Perfusion images are technically difficult secondary to artifact.  • There is a moderate-sized area of moderately to severely reduced isotope uptake in the anteroseptum with mild reversibility at rest.  • There is a small area of moderately reduced isotope uptake in the apical septum with mild improvement at rest.  • The inferior wall has mildly reduced uptake on resting images only.  • LVEF 55%.  Grossly normal wall motion.  • Overall findings are suggestive of ischemia in the territory of the left anterior descending coronary artery.     ECG.  Normal sinus rhythm, RSR prime in V1.  Diffuse ST-T wave abnormality.     Assessment/Plan    Post pericardiotomy syndrome  Chidi will continue on the ibuprofen 400 mg and try to decrease it to twice a day.  He will continue on the colchicine.    His EKG shows ST-T wave abnormalities that are more pronounced and are often seen post procedure or with pericarditis.  No anginal symptoms.  His exercise tolerance has improved.    I will have Chidi get a follow-up echocardiogram.    He has an appointment to see Dr. Kaba on the 28th.    I have discontinued his metoprolol as his blood pressures at the lower end of normal and he does have some dizziness when he bends over and then stands up.  He also has had some fatigue which this may be contributing to.    Coronary artery disease of native artery of native heart with stable angina pectoris (CMS/[facility])  No anginal symptoms.  Chidi will continue on the Plavix.  I had held his aspirin given that he is on the high dose ibuprofen.    He has a follow-up appointment to see   Sesar on the 28th.  He will continue with the colchicine.    Mixed hyperlipidemia  Counseled on a low-fat low-cholesterol diet.  I will reinstitute the rosuvastatin.  I encouraged him to continue his walking and exercise program.    We had a prolonged discussion about these complex clinical issues and went over the various important aspects to consider. All questions were answered.      Chidi will be coming due for his day-to-day care.  I have asked him to return to see me in 1 month's time or sooner if there is a problem.  I will continue to keep you informed of his progress.           Thank you for allowing me to participate in the care of this patient.  If you have any questions please don't hesitate to contact me.    I spent 30 minutes on this date of service performing the following activities: obtaining history, performing examination, entering orders, documenting, preparing for visit, obtaining / reviewing records, providing counseling and education and communicating results.     Chidi Pham MD [facility]   8/8/2023  11:15 AM

## 2023-08-08 NOTE — ASSESSMENT & PLAN NOTE
No anginal symptoms.  Chidi will continue on the Plavix.  I had held his aspirin given that he is on the high dose ibuprofen.    He has a follow-up appointment to see Dr. Kaba on the 28th.  He will continue with the colchicine.

## 2023-08-08 NOTE — ASSESSMENT & PLAN NOTE
Chidi will continue on the ibuprofen 400 mg and try to decrease it to twice a day.  He will continue on the colchicine.    His EKG shows ST-T wave abnormalities that are more pronounced and are often seen post procedure or with pericarditis.  No anginal symptoms.  His exercise tolerance has improved.    I will have Chidi get a follow-up echocardiogram.    He has an appointment to see Dr. Kaba on the 28th.    I have discontinued his metoprolol as his blood pressures at the lower end of normal and he does have some dizziness when he bends over and then stands up.  He also has had some fatigue which this may be contributing to.

## 2023-08-09 NOTE — TELEPHONE ENCOUNTER
Echo @ [facility]: NPR per Fabiano via ph #[phone]. Call ref #[id number]

## 2023-08-15 ENCOUNTER — HOSPITAL ENCOUNTER (OUTPATIENT)
Dept: CARDIOLOGY | Facility: CLINIC | Age: 63
Discharge: HOME | End: 2023-08-15
Attending: INTERNAL MEDICINE
Payer: COMMERCIAL

## 2023-08-15 VITALS
BODY MASS INDEX: 27.06 KG/M2 | DIASTOLIC BLOOD PRESSURE: 70 MMHG | SYSTOLIC BLOOD PRESSURE: 112 MMHG | WEIGHT: 189 LBS | HEIGHT: 70 IN

## 2023-08-15 DIAGNOSIS — I97.0 POST PERICARDIOTOMY SYNDROME: ICD-10-CM

## 2023-08-15 PROCEDURE — 93306 TTE W/DOPPLER COMPLETE: CPT | Performed by: INTERNAL MEDICINE

## 2023-08-16 LAB
AORTIC ROOT ANNULUS: 3.1 CM
ASCENDING AORTA: 3.1 CM
AV PEAK GRADIENT: 6 MMHG
AV PEAK VELOCITY-S: 1.2 M/S
AV VALVE AREA: 1.68 CM2
BSA FOR ECHO PROCEDURE: 2.06 M2
CUSP SEPARATION: 1.8 CM
E WAVE DECELERATION TIME: 240 MS
E/A RATIO: 0.7
E/E' RATIO: 8.9
E/LAT E' RATIO: 5.6
EDV (BP): 62.3 CM3
EF (A4C): 55.9 %
EF A2C: 57.6 %
EJECTION FRACTION: 54.9 %
ESV (BP): 28.1 CM3
FRACTIONAL SHORTENING: 28.57 %
INTERVENTRICULAR SEPTUM: 0.77 CM
LA ESV (BP): 21.1 CM3
LA ESV INDEX (A2C): 9.08 CM3/M2
LA ESV INDEX (BP): 10.24 CM3/M2
LA/AORTA RATIO: 1.03
LAAS-AP2: 9.5 CM2
LAAS-AP4: 11 CM2
LAD 2D: 3.2 CM
LALD A4C: 3.92 CM
LALD A4C: 4.38 CM
LAV-S: 18.7 CM3
LEFT ATRIUM VOLUME INDEX: 10.49 CM3/M2
LEFT ATRIUM VOLUME: 21.6 CM3
LEFT INTERNAL DIMENSION IN SYSTOLE: 2.95 CM (ref 3.03–4.59)
LEFT VENTRICLE DIASTOLIC VOLUME INDEX: 28.4 CM3/M2
LEFT VENTRICLE DIASTOLIC VOLUME: 58.5 CM3
LEFT VENTRICLE SYSTOLIC VOLUME INDEX: 12.52 CM3/M2
LEFT VENTRICLE SYSTOLIC VOLUME: 25.8 CM3
LEFT VENTRICULAR INTERNAL DIMENSION IN DIASTOLE: 4.13 CM (ref 5.16–7.17)
LEFT VENTRICULAR POSTERIOR WALL IN END DIASTOLE: 0.8 CM (ref 0.66–1.24)
LV DIASTOLIC VOLUME: 64.2 CM3
LV ESV (APICAL 2 CHAMBER): 27.2 CM3
LVAD-AP2: 22.9 CM2
LVAD-AP4: 22.7 CM2
LVAS-AP2: 13.3 CM2
LVAS-AP4: 13.6 CM2
LVEDVI(A2C): 31.17 CM3/M2
LVEDVI(BP): 30.24 CM3/M2
LVESVI(A2C): 13.2 CM3/M2
LVESVI(BP): 13.64 CM3/M2
LVLD-AP2: 6.98 CM
LVLD-AP4: 7.29 CM
LVLS-AP2: 5.49 CM
LVLS-AP4: 6.26 CM
LVOT 2D: 2 CM
LVOT A: 3.14 CM2
LVOT PEAK VELOCITY: 0.82 M/S
LVOT PG: 3 MMHG
MLH CV ECHO AVA INDEX VELOCITY RATIO: 0.8
MV E'TISSUE VEL-LAT: 0.11 M/S
MV E'TISSUE VEL-MED: 0.07 M/S
MV PEAK A VEL: 0.85 M/S
MV PEAK E VEL: 0.63 M/S
POSTERIOR WALL: 0.8 CM
PULMONARY REGURGITATION LATE DIASTOLIC VELOCITY: 1.49 M/S
RVOT VMAX: 0.79 M/S
SEPTAL TISSUE DOPPLER FREE WALL LATE DIA VELOCITY (APICAL 4 CHAMBER VIEW): 0.1 M/S
Z-SCORE OF LEFT VENTRICULAR DIMENSION IN END DIASTOLE: -3.87
Z-SCORE OF LEFT VENTRICULAR DIMENSION IN END SYSTOLE: -1.85
Z-SCORE OF LEFT VENTRICULAR POSTERIOR WALL IN END DIASTOLE: -0.64

## 2023-08-17 RX ORDER — COLCHICINE 0.6 MG/1
0.6 TABLET ORAL DAILY
Qty: 90 TABLET | Refills: 3 | Status: SHIPPED | OUTPATIENT
Start: 2023-08-17 | End: 2023-08-21 | Stop reason: SDUPTHER

## 2023-08-21 DIAGNOSIS — I97.0 POST PERICARDIOTOMY SYNDROME: ICD-10-CM

## 2023-08-21 RX ORDER — COLCHICINE 0.6 MG/1
0.6 TABLET ORAL DAILY
Qty: 90 TABLET | Refills: 3 | Status: SHIPPED | OUTPATIENT
Start: 2023-08-21 | End: 2023-10-16 | Stop reason: ALTCHOICE

## 2023-08-28 ENCOUNTER — OFFICE VISIT (OUTPATIENT)
Dept: CARDIOTHORACIC SURGERY | Facility: CLINIC | Age: 63
End: 2023-08-28
Payer: COMMERCIAL

## 2023-08-28 VITALS
TEMPERATURE: 97.9 F | OXYGEN SATURATION: 97 % | HEART RATE: 88 BPM | SYSTOLIC BLOOD PRESSURE: 110 MMHG | BODY MASS INDEX: 27.04 KG/M2 | DIASTOLIC BLOOD PRESSURE: 78 MMHG | WEIGHT: 188.44 LBS

## 2023-08-28 DIAGNOSIS — Z95.1 S/P CABG X 1: ICD-10-CM

## 2023-08-28 PROCEDURE — 99024 POSTOP FOLLOW-UP VISIT: CPT | Performed by: THORACIC SURGERY (CARDIOTHORACIC VASCULAR SURGERY)

## 2023-08-28 NOTE — PROGRESS NOTES
S/p 7/18/23 OPERATIVE PROCEDURE:   robotic-assisted small anterior thoracotomy, single coronary artery bypass, with left internal mammary artery bypass to the anterior descending artery.    8/28/23  Chidi returns today after having robotic bypass done on July 18.  Because he was having soreness in his chest he was started on Motrin by Dr. Garcia and  he is patiently weaning that drug.  While he was on the Motrin, his aspirin and Crestor was discontinued but he remains on Plavix.  He is planning on seeing Dr. Garcia to resume his Crestor etc. in the near future.  Patient still has some soreness at his thoracotomy and hypersensitivity of his skin but overall is doing great.    Vitals:    08/28/23 1413   BP: 110/78   Pulse: 88   Temp: 36.6 °C (97.9 °F)   SpO2: 97%     On exam his heart was regular, lungs clear bilaterally and wound healing well.  This patient can be discharged from our service.  It would be impossible for any patient to look better than he looks right now.

## 2023-08-29 PROBLEM — I25.118 CORONARY ARTERY DISEASE OF NATIVE ARTERY OF NATIVE HEART WITH STABLE ANGINA PECTORIS (CMS/HCC): Status: RESOLVED | Noted: 2023-07-18 | Resolved: 2023-08-29

## 2023-08-29 PROBLEM — Z95.1 S/P CABG X 1: Status: ACTIVE | Noted: 2023-07-18

## 2023-10-13 ENCOUNTER — OFFICE VISIT (OUTPATIENT)
Dept: FAMILY MEDICINE | Facility: CLINIC | Age: 63
End: 2023-10-13
Payer: COMMERCIAL

## 2023-10-13 VITALS
BODY MASS INDEX: 27.11 KG/M2 | WEIGHT: 189.4 LBS | SYSTOLIC BLOOD PRESSURE: 120 MMHG | HEIGHT: 70 IN | HEART RATE: 97 BPM | TEMPERATURE: 97.9 F | DIASTOLIC BLOOD PRESSURE: 80 MMHG | OXYGEN SATURATION: 99 %

## 2023-10-13 DIAGNOSIS — I97.0 POST PERICARDIOTOMY SYNDROME: ICD-10-CM

## 2023-10-13 DIAGNOSIS — R73.9 ELEVATED BLOOD SUGAR: ICD-10-CM

## 2023-10-13 DIAGNOSIS — Z12.5 PROSTATE CANCER SCREENING: ICD-10-CM

## 2023-10-13 DIAGNOSIS — Z23 NEED FOR INFLUENZA VACCINATION: ICD-10-CM

## 2023-10-13 DIAGNOSIS — I25.10 CORONARY ARTERY DISEASE INVOLVING NATIVE CORONARY ARTERY OF NATIVE HEART WITHOUT ANGINA PECTORIS: Primary | ICD-10-CM

## 2023-10-13 DIAGNOSIS — N52.01 ERECTILE DYSFUNCTION DUE TO ARTERIAL INSUFFICIENCY: ICD-10-CM

## 2023-10-13 DIAGNOSIS — E78.2 MIXED HYPERLIPIDEMIA: ICD-10-CM

## 2023-10-13 DIAGNOSIS — Z95.1 S/P CABG X 1: ICD-10-CM

## 2023-10-13 PROBLEM — R94.39 ABNORMAL NUCLEAR STRESS TEST: Status: RESOLVED | Noted: 2023-06-26 | Resolved: 2023-10-13

## 2023-10-13 PROBLEM — I10 ELEVATED BLOOD PRESSURE READING IN OFFICE WITH DIAGNOSIS OF HYPERTENSION: Status: RESOLVED | Noted: 2023-06-13 | Resolved: 2023-10-13

## 2023-10-13 PROBLEM — R06.02 SHORTNESS OF BREATH: Status: RESOLVED | Noted: 2023-06-13 | Resolved: 2023-10-13

## 2023-10-13 PROBLEM — R00.2 PALPITATIONS: Status: RESOLVED | Noted: 2023-06-13 | Resolved: 2023-10-13

## 2023-10-13 PROCEDURE — 90686 IIV4 VACC NO PRSV 0.5 ML IM: CPT | Performed by: FAMILY MEDICINE

## 2023-10-13 PROCEDURE — 90471 IMMUNIZATION ADMIN: CPT | Performed by: FAMILY MEDICINE

## 2023-10-13 PROCEDURE — 3008F BODY MASS INDEX DOCD: CPT | Performed by: FAMILY MEDICINE

## 2023-10-13 PROCEDURE — 99204 OFFICE O/P NEW MOD 45 MIN: CPT | Mod: 25 | Performed by: FAMILY MEDICINE

## 2023-10-13 RX ORDER — ROSUVASTATIN CALCIUM 20 MG/1
20 TABLET, COATED ORAL DAILY
Qty: 90 TABLET | Refills: 3 | Status: SHIPPED | OUTPATIENT
Start: 2023-10-13 | End: 2024-04-24 | Stop reason: SDUPTHER

## 2023-10-13 RX ORDER — CLOPIDOGREL BISULFATE 75 MG/1
75 TABLET ORAL DAILY
Qty: 90 TABLET | Refills: 2 | Status: SHIPPED | OUTPATIENT
Start: 2023-10-13 | End: 2024-04-24 | Stop reason: SDUPTHER

## 2023-10-13 ASSESSMENT — ENCOUNTER SYMPTOMS
DIARRHEA: 0
MYALGIAS: 0
DYSURIA: 0
ABDOMINAL PAIN: 0
HEMATURIA: 0
PALPITATIONS: 0
VOMITING: 0
NAUSEA: 0
UNEXPECTED WEIGHT CHANGE: 0
COLOR CHANGE: 0
CHILLS: 0
FEVER: 0
RHINORRHEA: 0
SHORTNESS OF BREATH: 0
BLOOD IN STOOL: 0
COUGH: 0
TROUBLE SWALLOWING: 0
ARTHRALGIAS: 1
PHOTOPHOBIA: 0
CONSTIPATION: 0

## 2023-10-13 NOTE — PROGRESS NOTES
Subjective    Chidi Ramsey is a 62 y.o. male presenting today for: NPV    HPI: 63yo male with h/o CAD s/p CABG 2023, HL here to establish care and f/u on new diagnosis of CAD.    CAD: Had chest pain, palpitations. Found to have ischemic changes on stress, cath showed occlusion of LAD so had robotic cath 7/2023.   Walking 30min 2-3 days per week.  45-60 min once per week.  Diet - better than it was prior to CABG. Trying to pay attention to diet and working towards less sugar in diet.   Not currently taking aspirin or statin. These had been held for when he was on motrin.    He denies recurrence of palpitations, sweating.     Only concern is some ED - difficulty keeping an erection over the past few weeks. Wondering if any medications could be contributing.    HM:  Last colonoscopy within the past 3 years. [facility].   PSA overdue     Patient Active Problem List   Diagnosis    Mixed hyperlipidemia    Coronary artery disease    Post pericardiotomy syndrome    S/P CABG x 1    Erectile dysfunction due to arterial insufficiency          Current Outpatient Medications:     acetylcysteine (NAC ORAL), Take 1,000 mg by mouth daily., Disp: , Rfl:     ascorbic acid/multivit-min (EMERGEN-C ORAL), Take 1 packet by mouth daily., Disp: , Rfl:     cholecalciferol, vitamin D3, 1,000 unit (25 mcg) tablet, Take 1,000 Units by mouth daily., Disp: , Rfl:     clopidogreL (PLAVIX) 75 mg tablet, Take 1 tablet (75 mg total) by mouth daily., Disp: 90 tablet, Rfl: 2    colchicine (COLCRYS) 0.6 mg tablet, Take 1 tablet (0.6 mg total) by mouth daily., Disp: 90 tablet, Rfl: 3    mv-min/vit C/glut/lysine/hb124 (IMMUNE SUPPORT ORAL), Take 2 tablets by mouth daily., Disp: , Rfl:     rosuvastatin (CRESTOR) 20 mg tablet, Take 1 tablet (20 mg total) by mouth daily., Disp: 90 tablet, Rfl: 3    vitamin B complex (B COMPLEX ORAL), Take 1 tablet by mouth daily., Disp: , Rfl:      Allergies   Allergen Reactions    Augmentin  "[Amoxicillin-Pot Clavulanate] Other (see comments)     Stomach pain           Review of Systems   Constitutional: Negative for chills, fever and unexpected weight change.   HENT: Negative for congestion, hearing loss, rhinorrhea and trouble swallowing.    Eyes: Negative for photophobia and visual disturbance.   Respiratory: Negative for cough and shortness of breath.    Cardiovascular: Negative for chest pain and palpitations.   Gastrointestinal: Negative for abdominal pain, blood in stool, constipation, diarrhea, nausea and vomiting.   Genitourinary: Negative for dysuria and hematuria.        Npcturia x1; ED as per HPI   Musculoskeletal: Positive for arthralgias (left hip pain after golf). Negative for myalgias.   Skin: Negative for color change and rash.   All other systems reviewed and are negative.       Objective  Visit Vitals  /80 (BP Location: Right upper arm, Patient Position: Sitting)   Pulse 97   Temp 36.6 °C (97.9 °F) (Temporal)   Ht 1.778 m (5' 10\")   Wt 85.9 kg (189 lb 6.4 oz)   SpO2 99%   BMI 27.18 kg/m²    Body mass index is 27.18 kg/m².     Physical Exam  Vitals reviewed.   Constitutional:       General: He is not in acute distress.     Appearance: Normal appearance. He is not ill-appearing.   HENT:      Head: Normocephalic and atraumatic.      Right Ear: Tympanic membrane and ear canal normal.      Left Ear: Tympanic membrane and ear canal normal.      Nose: Nose normal. No congestion or rhinorrhea.      Mouth/Throat:      Mouth: Mucous membranes are moist.      Pharynx: Oropharynx is clear. No oropharyngeal exudate.   Eyes:      General:         Right eye: No discharge.         Left eye: No discharge.      Conjunctiva/sclera: Conjunctivae normal.      Pupils: Pupils are equal, round, and reactive to light.   Cardiovascular:      Rate and Rhythm: Normal rate and regular rhythm.      Heart sounds: No murmur heard.  Pulmonary:      Effort: Pulmonary effort is normal.      Breath sounds: Normal " "breath sounds. No wheezing, rhonchi or rales.   Abdominal:      General: Bowel sounds are normal. There is no distension.      Palpations: Abdomen is soft.      Tenderness: There is no abdominal tenderness.   Musculoskeletal:         General: No swelling or deformity.      Cervical back: Normal range of motion and neck supple.      Comments: Left hip - normal ROM, painless.  No tenderness laterally/over greater troch. No pain with resisted hip abduction.   Lymphadenopathy:      Cervical: No cervical adenopathy.   Skin:     General: Skin is warm and dry.   Neurological:      General: No focal deficit present.      Mental Status: He is alert and oriented to person, place, and time.   Psychiatric:         Mood and Affect: Mood normal.         Behavior: Behavior normal.             Laboratories  Lab Results   Component Value Date    GLUCOSE 115 (H) 07/26/2023    CALCIUM 8.2 (L) 07/21/2023     (L) 07/26/2023    K 4.8 07/26/2023    CO2 23 07/26/2023    CL 95 (L) 07/26/2023    BUN 21 07/26/2023    CREATININE 0.96 07/26/2023       Lab Results   Component Value Date    CHOL 184 06/16/2023     Lab Results   Component Value Date    HDL 50 06/16/2023     Lab Results   Component Value Date    LDLCALC 103 (H) 06/16/2023     Lab Results   Component Value Date    TRIG 177 (H) 06/16/2023     No results found for: \"CHOLHDL\"    Lab Results   Component Value Date    HGBA1C 5.3 06/16/2023           Assessment/Plan  Problem List Items Addressed This Visit        Circulatory    Coronary artery disease - Primary     He is off ibuprofen so should restart aspirin 81mg and rosuvastatin 20mg daily.  Continue plavix.  Continue working towards mediterranean diet and avoiding added sugars.           Relevant Medications    rosuvastatin (CRESTOR) 20 mg tablet    clopidogreL (PLAVIX) 75 mg tablet    Other Relevant Orders    Lipid panel    CBC and differential    Post pericardiotomy syndrome     I will reach out to Dr. Pham to see if he " can come off the colchicine.         S/P CABG x 1    Erectile dysfunction due to arterial insufficiency     Likely start PDE5i; I just want to double check with cardiology that there is no contraindication.            Other    Mixed hyperlipidemia    Relevant Medications    rosuvastatin (CRESTOR) 20 mg tablet    Other Relevant Orders    Comprehensive metabolic panel   Other Visit Diagnoses     Prostate cancer screening        Relevant Orders    PSA    Elevated blood sugar        Relevant Orders    Hemoglobin A1c    Need for influenza vaccination        Relevant Orders    Influenza vaccine quadrivalent preservative free 6 mon and older IM (Completed)         Influenza vaccine today.  Declines further COVID vaccines.  Obtain colonoscopy report.     Return in about 3 months (around 1/13/2024).     Melody Murphy MD

## 2023-10-13 NOTE — ASSESSMENT & PLAN NOTE
Likely start PDE5i; I just want to double check with cardiology that there is no contraindication.

## 2023-10-13 NOTE — ASSESSMENT & PLAN NOTE
He is off ibuprofen so should restart aspirin 81mg and rosuvastatin 20mg daily.  Continue plavix.  Continue working towards mediterranean diet and avoiding added sugars.

## 2023-10-16 DIAGNOSIS — N52.01 ERECTILE DYSFUNCTION DUE TO ARTERIAL INSUFFICIENCY: Primary | ICD-10-CM

## 2023-10-16 RX ORDER — SILDENAFIL 25 MG/1
25 TABLET, FILM COATED ORAL DAILY PRN
Qty: 30 TABLET | Refills: 3 | Status: SHIPPED | OUTPATIENT
Start: 2023-10-16 | End: 2024-12-12

## 2023-10-20 ENCOUNTER — OFFICE VISIT (OUTPATIENT)
Dept: CARDIOLOGY | Facility: CLINIC | Age: 63
End: 2023-10-20
Payer: COMMERCIAL

## 2023-10-20 VITALS
WEIGHT: 189 LBS | DIASTOLIC BLOOD PRESSURE: 82 MMHG | HEIGHT: 70 IN | SYSTOLIC BLOOD PRESSURE: 110 MMHG | HEART RATE: 62 BPM | RESPIRATION RATE: 16 BRPM | BODY MASS INDEX: 27.06 KG/M2

## 2023-10-20 DIAGNOSIS — I97.0 POST PERICARDIOTOMY SYNDROME: ICD-10-CM

## 2023-10-20 DIAGNOSIS — E78.2 MIXED HYPERLIPIDEMIA: Primary | ICD-10-CM

## 2023-10-20 DIAGNOSIS — I25.10 CORONARY ARTERY DISEASE INVOLVING NATIVE CORONARY ARTERY OF NATIVE HEART WITHOUT ANGINA PECTORIS: ICD-10-CM

## 2023-10-20 PROCEDURE — 93000 ELECTROCARDIOGRAM COMPLETE: CPT | Performed by: INTERNAL MEDICINE

## 2023-10-20 PROCEDURE — 3008F BODY MASS INDEX DOCD: CPT | Performed by: INTERNAL MEDICINE

## 2023-10-20 PROCEDURE — 99214 OFFICE O/P EST MOD 30 MIN: CPT | Performed by: INTERNAL MEDICINE

## 2023-10-20 RX ORDER — NAPROXEN SODIUM 220 MG/1
81 TABLET, FILM COATED ORAL DAILY
Qty: 90 TABLET | Refills: 3
Start: 2023-10-20

## 2023-10-20 ASSESSMENT — ENCOUNTER SYMPTOMS
DYSPNEA ON EXERTION: 0
PALPITATIONS: 0
SYNCOPE: 0
MUSCLE CRAMPS: 1
NEAR-SYNCOPE: 0
PND: 0
ORTHOPNEA: 0
IRREGULAR HEARTBEAT: 0

## 2023-10-20 NOTE — ASSESSMENT & PLAN NOTE
Status post robotic LIMA to his LAD.  He will continue on the aspirin, Plavix and statin therapy.    I encouraged Chidi to continue to be active golfing and exercising and have given him information about the Mediterranean diet.

## 2023-10-20 NOTE — LETTER
October 20, 2023     Melody Murphy MD  [address] [address]    Patient: Chidi Ramsey  YOB: 1960  Date of Visit: 10/20/2023      Dear Dr. Murphy:    Thank you for referring Chidi Ramsey to me for evaluation. Below are my notes for this consultation.    If you have questions, please do not hesitate to call me. I look forward to following your patient along with you.         Sincerely,        Chidi Pham MD        CC: No Recipients    Chidi Pham MD  10/20/2023 11:57 AM  Signed     Cardiology Note       Reason for visit: Coronary artery disease.  Status post robotic LIMA to LAD.      Chidi returns today for follow-up.  He tells me he has been doing well without any  anginal symptoms or shortness of breath.  He still has some mild discomfort over his incision intermittently.  He has not had any dizziness, near-syncope or syncope.  He plays golf regularly without any difficulty.    He is now back on the rosuvastatin and did notice some leg cramping.  I told to continue to monitor and if it does to contact our office and I will prescribe an alternative medication for him.    He is now back on the aspirin and is on the Plavix.  The Plavix will be maintained for approximately 6 to 12 months post bypass.    Vishnu is not having neurologic or TIA-like symptoms.  No slurred speech or unilateral weakness or visual loss.  No bleeding issues.        Past Medical History:   Diagnosis Date    Mixed hyperlipidemia 6/13/2023    Palpitations 06/12/2023    SOB (shortness of breath)      Past Surgical History:   Procedure Laterality Date    APPENDECTOMY      2019    CORONARY ARTERY BYPASS GRAFT  07/18/2023    robotic     Social History     Tobacco Use    Smoking status: Never    Smokeless tobacco: Never   Vaping Use    Vaping Use: Never used   Substance Use Topics    Alcohol use: Yes     Comment: rare now    Drug use: Never      Family  History   Problem Relation Age of Onset    Multiple sclerosis Biological Mother     Hyperlipidemia Biological Father     Hypertension Biological Father     Diabetes Biological Father     Heart disease Biological Father     Diabetes Biological Sister     No Known Problems Biological Sister     No Known Problems Biological Sister     No Known Problems Biological Sister     No Known Problems Biological Brother     Colon cancer Neg Hx     Prostate cancer Neg Hx      Augmentin [amoxicillin-pot clavulanate]  Current Outpatient Medications   Medication Instructions    acetylcysteine (NAC ORAL) 1,000 mg, oral, Daily    ascorbic acid/multivit-min (EMERGEN-C ORAL) 1 packet, oral, Daily    aspirin 81 mg, oral, Daily    cholecalciferol (vitamin D3) 1,000 Units, oral, Daily    clopidogreL (PLAVIX) 75 mg, oral, Daily    mv-min/vit C/glut/lysine/hb124 (IMMUNE SUPPORT ORAL) 2 tablets, oral, Daily    rosuvastatin (CRESTOR) 20 mg, oral, Daily    sildenafiL (VIAGRA) 25 mg, oral, Daily PRN    TURMERIC ROOT-RUPERT ROOT EXT ORAL 1 tablet, oral, Daily    vitamin B complex (B COMPLEX ORAL) 1 tablet, oral, Daily          Review of Systems   Cardiovascular: Negative for chest pain, dyspnea on exertion, irregular heartbeat, leg swelling, near-syncope, orthopnea, palpitations, paroxysmal nocturnal dyspnea and syncope.   Musculoskeletal: Positive for muscle cramps.      Objective    Vitals:    10/20/23 1121   BP: 110/82   Pulse: 62   Resp: 16      Wt Readings from Last 3 Encounters:   10/20/23 85.7 kg (189 lb)   10/13/23 85.9 kg (189 lb 6.4 oz)   08/28/23 85.5 kg (188 lb 7 oz)      Physical Exam  Constitutional:       Appearance: He is well-developed.   Neck:      Vascular: No carotid bruit or JVD.   Cardiovascular:      Rate and Rhythm: Normal rate and regular rhythm.      Pulses:           Carotid pulses are 2+ on the right side and 2+ on the left side.       Dorsalis pedis pulses are 2+ on the right side and 2+ on the  left side.        Posterior tibial pulses are 2+ on the right side and 2+ on the left side.      Heart sounds: S1 normal and S2 normal. No murmur heard.     No friction rub. No gallop.   Pulmonary:      Effort: Pulmonary effort is normal.      Breath sounds: Normal breath sounds.   Skin:     General: Skin is warm and dry.   Neurological:      Mental Status: He is alert.   Psychiatric:         Behavior: Behavior normal.                      Robotic CABG.  7/18/2023  Operative Procedures Performed  OP CAB MID/THORACOTOMY CAPO ELENA Dr.        Carotid Dopplers.  7/6/2023  IMPRESSION:  Minimal plaque within the internal carotid arteries, causing less than 50%  stenosis.        Transthoracic echocardiogram.  7/6/2023   The left ventricular cavity size is normal with normal wall thickness and preserved systolic function. EF 55% by visual estimate. Difficult to assess regional wall motions despite use of Definity-Grossly normal wall motion. Normal diastolic function for age.   Aortic valve cusps poorly visualized. The visualized portions of the aortic root and ascending aorta are normal in size.  No aortic coarctation.  No aortic insufficiency or stenosis.   The mitral valve is normal. Trace mitral regurgitation.    The left atrium is normal in size.    Limited visualization but the right ventricle appears normal size with preserved systolic function.    The pulmonic valve is not well seen.    The tricuspid is normal in appearance. Trace tricuspid regurgitation with insufficient jet to estimate RVSP.     Right atrium is not well seen.   The IVC is small and collapses > 50% with inspiration consistent with normal right atrial pressures.   No pericardial effusion.    No prior study for comparison.           Left heart cath and coronary angiography.  7/6/2023  Coronary Angiogram/Left Heart Catheterization   1. 100% ostial LAD chronic total occlusion with left to left and right to left  collaterals  2. Mild nonobstructive disease in the remaining coronary tree  3. LVEDP 13 mmHg        Exercise nuclear stress test.  6/19/2023.   This is an abnormal exercise nuclear stress test.   EKG with 2-2.5 mm upsloping ST depression in the inferolateral leads starting at 6:16 of exercise.  Lateral lead ST depression appears slightly more horizontal.  This persisted until approximately 2 minutes of recovery.   No exercise-induced arrhythmia.  Good exercise capacity.   Perfusion images are technically difficult secondary to artifact.   There is a moderate-sized area of moderately to severely reduced isotope uptake in the anteroseptum with mild reversibility at rest.   There is a small area of moderately reduced isotope uptake in the apical septum with mild improvement at rest.   The inferior wall has mildly reduced uptake on resting images only.   LVEF 55%.  Grossly normal wall motion.   Overall findings are suggestive of ischemia in the territory of the left anterior descending coronary artery.      ECG.  Normal sinus rhythm, anterolateral ST-T wave abnormality.  No change compared to previous EKG.     Assessment/Plan    Coronary artery disease involving native coronary artery of native heart  Status post robotic LIMA to his LAD.  He will continue on the aspirin, Plavix and statin therapy.    I encouraged Chidi to continue to be active golfing and exercising and have given him information about the Mediterranean diet.    Mixed hyperlipidemia  I have reviewed Chidi's lipids with him and I told him now that he has known coronary artery disease, this puts him in a different category in terms of where we want his LDL value to be.  I told him I want his LDL below 70.    He will continue on the rosuvastatin.  He will call and let me know if he is having persistent leg cramps.  If he does I will prescribe an alternative medication.  If not he will continue on the rosuvastatin and will get a repeat lipid  profile and CMP in 3 months.    Chidi will be coming due for his day-to-day care.  He will return to see me in 6 months time or sooner if there is a problem.  I will continue to keep you informed of his progress.  He will call with any questions or concerns in the interim.           Thank you for allowing me to participate in the care of this patient.  If you have any questions please don't hesitate to contact me.    I spent 30 minutes on this date of service performing the following activities: obtaining history, performing examination, entering orders, documenting, preparing for visit, obtaining / reviewing records, providing counseling and education and communicating results.     Chidi Pham MD [facility]   10/20/2023  11:57 AM

## 2023-10-20 NOTE — PATIENT INSTRUCTIONS
Patient Education   Mediterranean Diet  A Mediterranean diet refers to food and lifestyle choices that are based on the traditions of countries located on the [place]. It focuses on eating more fruits, vegetables, whole grains, beans, nuts, seeds, and heart-healthy fats, and eating less dairy, meat, eggs, and processed foods with added sugar, salt, and fat. This way of eating has been shown to help prevent certain conditions and improve outcomes for people who have chronic diseases, like kidney disease and heart disease.  What are tips for following this plan?  Reading food labels  Check the serving size of packaged foods. For foods such as rice and pasta, the serving size refers to the amount of cooked product, not dry.  Check the total fat in packaged foods. Avoid foods that have saturated fat or trans fats.  Check the ingredient list for added sugars, such as corn syrup.  Shopping    Buy a variety of foods that offer a balanced diet, including:  Fresh fruits and vegetables (produce).  Grains, beans, nuts, and seeds. Some of these may be available in unpackaged forms or large amounts (in bulk).  Fresh seafood.  Poultry and eggs.  Low-fat dairy products.  Buy whole ingredients instead of prepackaged foods.  Buy fresh fruits and vegetables in-season from local [employer] markets.  Buy plain frozen fruits and vegetables.  If you do not have access to quality fresh seafood, buy precooked frozen shrimp or canned fish, such as tuna, salmon, or sardines.  Stock your pantry so you always have certain foods on hand, such as olive oil, canned tuna, canned tomatoes, rice, pasta, and beans.  Cooking  Cook foods with extra-virgin olive oil instead of using butter or other vegetable oils.  Have meat as a side dish, and have vegetables or grains as your main dish. This means having meat in small portions or adding small amounts of meat to foods like pasta or stew.  Use beans or vegetables instead of meat in common dishes  like chili or lasagna.  [place] with different cooking methods. Try roasting, broiling, steaming, and sautéing vegetables.  Add frozen vegetables to soups, stews, pasta, or rice.  Add nuts or seeds for added healthy fats and plant protein at each meal. You can add these to yogurt, salads, or vegetable dishes.  Marinate fish or vegetables using olive oil, lemon juice, garlic, and fresh herbs.  Meal planning  Plan to eat one vegetarian meal one day each week. Try to work up to two vegetarian meals, if possible.  Eat seafood two or more times a week.  Have healthy snacks readily available, such as:  Vegetable sticks with hummus.  Greek yogurt.  Fruit and nut trail mix.  Eat balanced meals throughout the week. This includes:  Fruit: 2-3 servings a day.  Vegetables: 4-5 servings a day.  Low-fat dairy: 2 servings a day.  Fish, poultry, or lean meat: 1 serving a day.  Beans and legumes: 2 or more servings a week.  Nuts and seeds: 1-2 servings a day.  Whole grains: 6-8 servings a day.  Extra-virgin olive oil: 3-4 servings a day.  Limit red meat and sweets to only a few servings a month.  Lifestyle    Cook and eat meals together with your family, when possible.  Drink enough fluid to keep your urine pale yellow.  Be physically active every day. This includes:  Aerobic exercise like running or swimming.  Leisure activities like gardening, walking, or housework.  Get 7-8 hours of sleep each night.  If recommended by your health care provider, drink red wine in moderation. This means 1 glass a day for nonpregnant women and 2 glasses a day for men. A glass of wine equals 5 oz (150 mL).  What foods should I eat?  Fruits  Apples. Apricots. Avocado. Berries. Bananas. Cherries. Dates. Figs. Grapes. Rosey. Melon. Oranges. Peaches. Plums. Pomegranate.  Vegetables  Artichokes. Beets. Broccoli. Cabbage. Carrots. Eggplant. Green beans. Chard. Kale. Spinach. Onions. Leeks. Peas. Squash. Tomatoes. Peppers.  Radishes.  Grains  Whole-grain pasta. Brown rice. Bulgur wheat. Polenta. Couscous. Whole-wheat bread. Oatmeal. Quinoa.  Meats and other proteins  Beans. Almonds. Sunflower seeds. Pine nuts. Peanuts. Cod. [place]. Scallops. Shrimp. Tuna. Tilapia. Clams. Oysters. Eggs. Poultry without skin.  Dairy  Low-fat milk. Cheese. Greek yogurt.  Fats and oils  Extra-virgin olive oil. Avocado oil. Grapeseed oil.  Beverages  Water. Red wine. Herbal tea.  Sweets and desserts  Greek yogurt with honey. Baked apples. Poached pears. Trail mix.  Seasonings and condiments  Basil. Cilantro. Coriander. Cumin. Mint. Parsley. Celso. Rosemary. Tarragon. Garlic. Oregano. Thyme. Pepper. Balsamic vinegar. Tahini. Hummus. Tomato sauce. Olives. Mushrooms.  The items listed above may not be a complete list of foods and beverages you can eat. Contact a dietitian for more information.  What foods should I limit?  This is a list of foods that should be eaten rarely or only on special occasions.  Fruits  Fruit canned in syrup.  Vegetables  Deep-fried potatoes (french fries).  Grains  Prepackaged pasta or rice dishes. Prepackaged cereal with added sugar. Prepackaged snacks with added sugar.  Meats and other proteins  Beef. Pork. Lamb. Poultry with skin. Hot dogs. [place].  Dairy  Ice cream. Sour cream. Whole milk.  Fats and oils  Butter. Canola oil. Vegetable oil. Beef fat (tallow). Lard.  Beverages  Juice. Sugar-sweetened soft drinks. Beer. Liquor and spirits.  Sweets and desserts  Cookies. Cakes. Pies. Candy.  Seasonings and condiments  Mayonnaise. Pre-made sauces and marinades.  The items listed above may not be a complete list of foods and beverages you should limit. Contact a dietitian for more information.  Summary  The Mediterranean diet includes both food and lifestyle choices.  Eat a variety of fresh fruits and vegetables, beans, nuts, seeds, and whole grains.  Limit the amount of red meat and sweets that you eat.  If recommended by your health  care provider, drink red wine in moderation. This means 1 glass a day for nonpregnant women and 2 glasses a day for men. A glass of wine equals 5 oz (150 mL).  This information is not intended to replace advice given to you by your health care provider. Make sure you discuss any questions you have with your health care provider.  Document Revised: 01/22/2021 Document Reviewed: 11/19/2020  Elsevier Patient Education © 2023 Elsevier Inc.

## 2023-10-20 NOTE — PROGRESS NOTES
Cardiology Note       Reason for visit: Coronary artery disease.  Status post robotic LIMA to LAD.      Chidi returns today for follow-up.  He tells me he has been doing well without any  anginal symptoms or shortness of breath.  He still has some mild discomfort over his incision intermittently.  He has not had any dizziness, near-syncope or syncope.  He plays golf regularly without any difficulty.    He is now back on the rosuvastatin and did notice some leg cramping.  I told to continue to monitor and if it does to contact our office and I will prescribe an alternative medication for him.    He is now back on the aspirin and is on the Plavix.  The Plavix will be maintained for approximately 6 to 12 months post bypass.    Vishnu is not having neurologic or TIA-like symptoms.  No slurred speech or unilateral weakness or visual loss.  No bleeding issues.        Past Medical History:   Diagnosis Date    Mixed hyperlipidemia 6/13/2023    Palpitations 06/12/2023    SOB (shortness of breath)      Past Surgical History:   Procedure Laterality Date    APPENDECTOMY      2019    CORONARY ARTERY BYPASS GRAFT  07/18/2023    robotic     Social History     Tobacco Use    Smoking status: Never    Smokeless tobacco: Never   Vaping Use    Vaping Use: Never used   Substance Use Topics    Alcohol use: Yes     Comment: rare now    Drug use: Never      Family History   Problem Relation Age of Onset    Multiple sclerosis Biological Mother     Hyperlipidemia Biological Father     Hypertension Biological Father     Diabetes Biological Father     Heart disease Biological Father     Diabetes Biological Sister     No Known Problems Biological Sister     No Known Problems Biological Sister     No Known Problems Biological Sister     No Known Problems Biological Brother     Colon cancer Neg Hx     Prostate cancer Neg Hx      Augmentin [amoxicillin-pot clavulanate]  Current Outpatient Medications   Medication  Instructions    acetylcysteine (NAC ORAL) 1,000 mg, oral, Daily    ascorbic acid/multivit-min (EMERGEN-C ORAL) 1 packet, oral, Daily    aspirin 81 mg, oral, Daily    cholecalciferol (vitamin D3) 1,000 Units, oral, Daily    clopidogreL (PLAVIX) 75 mg, oral, Daily    mv-min/vit C/glut/lysine/hb124 (IMMUNE SUPPORT ORAL) 2 tablets, oral, Daily    rosuvastatin (CRESTOR) 20 mg, oral, Daily    sildenafiL (VIAGRA) 25 mg, oral, Daily PRN    TURMERIC ROOT-RUPERT ROOT EXT ORAL 1 tablet, oral, Daily    vitamin B complex (B COMPLEX ORAL) 1 tablet, oral, Daily          Review of Systems   Cardiovascular: Negative for chest pain, dyspnea on exertion, irregular heartbeat, leg swelling, near-syncope, orthopnea, palpitations, paroxysmal nocturnal dyspnea and syncope.   Musculoskeletal: Positive for muscle cramps.      Objective    Vitals:    10/20/23 1121   BP: 110/82   Pulse: 62   Resp: 16      Wt Readings from Last 3 Encounters:   10/20/23 85.7 kg (189 lb)   10/13/23 85.9 kg (189 lb 6.4 oz)   08/28/23 85.5 kg (188 lb 7 oz)      Physical Exam  Constitutional:       Appearance: He is well-developed.   Neck:      Vascular: No carotid bruit or JVD.   Cardiovascular:      Rate and Rhythm: Normal rate and regular rhythm.      Pulses:           Carotid pulses are 2+ on the right side and 2+ on the left side.       Dorsalis pedis pulses are 2+ on the right side and 2+ on the left side.        Posterior tibial pulses are 2+ on the right side and 2+ on the left side.      Heart sounds: S1 normal and S2 normal. No murmur heard.     No friction rub. No gallop.   Pulmonary:      Effort: Pulmonary effort is normal.      Breath sounds: Normal breath sounds.   Skin:     General: Skin is warm and dry.   Neurological:      Mental Status: He is alert.   Psychiatric:         Behavior: Behavior normal.                      Robotic CABG.  7/18/2023  Operative Procedures Performed  OP CAB MID/THORACOTOMY CAPO ELENA Dr.  Sesar        Carotid Dopplers.  7/6/2023  IMPRESSION:  Minimal plaque within the internal carotid arteries, causing less than 50%  stenosis.        Transthoracic echocardiogram.  7/6/2023   The left ventricular cavity size is normal with normal wall thickness and preserved systolic function. EF 55% by visual estimate. Difficult to assess regional wall motions despite use of Definity-Grossly normal wall motion. Normal diastolic function for age.   Aortic valve cusps poorly visualized. The visualized portions of the aortic root and ascending aorta are normal in size.  No aortic coarctation.  No aortic insufficiency or stenosis.   The mitral valve is normal. Trace mitral regurgitation.    The left atrium is normal in size.    Limited visualization but the right ventricle appears normal size with preserved systolic function.    The pulmonic valve is not well seen.    The tricuspid is normal in appearance. Trace tricuspid regurgitation with insufficient jet to estimate RVSP.     Right atrium is not well seen.   The IVC is small and collapses > 50% with inspiration consistent with normal right atrial pressures.   No pericardial effusion.    No prior study for comparison.           Left heart cath and coronary angiography.  7/6/2023  Coronary Angiogram/Left Heart Catheterization   1. 100% ostial LAD chronic total occlusion with left to left and right to left collaterals  2. Mild nonobstructive disease in the remaining coronary tree  3. LVEDP 13 mmHg        Exercise nuclear stress test.  6/19/2023.   This is an abnormal exercise nuclear stress test.   EKG with 2-2.5 mm upsloping ST depression in the inferolateral leads starting at 6:16 of exercise.  Lateral lead ST depression appears slightly more horizontal.  This persisted until approximately 2 minutes of recovery.   No exercise-induced arrhythmia.  Good exercise capacity.   Perfusion images are technically difficult secondary to artifact.   There is a  moderate-sized area of moderately to severely reduced isotope uptake in the anteroseptum with mild reversibility at rest.   There is a small area of moderately reduced isotope uptake in the apical septum with mild improvement at rest.   The inferior wall has mildly reduced uptake on resting images only.   LVEF 55%.  Grossly normal wall motion.   Overall findings are suggestive of ischemia in the territory of the left anterior descending coronary artery.      ECG.  Normal sinus rhythm, anterolateral ST-T wave abnormality.  No change compared to previous EKG.     Assessment/Plan    Coronary artery disease involving native coronary artery of native heart  Status post robotic LIMA to his LAD.  He will continue on the aspirin, Plavix and statin therapy.    I encouraged Chidi to continue to be active golfing and exercising and have given him information about the Mediterranean diet.    Mixed hyperlipidemia  I have reviewed Chidi's lipids with him and I told him now that he has known coronary artery disease, this puts him in a different category in terms of where we want his LDL value to be.  I told him I want his LDL below 70.    He will continue on the rosuvastatin.  He will call and let me know if he is having persistent leg cramps.  If he does I will prescribe an alternative medication.  If not he will continue on the rosuvastatin and will get a repeat lipid profile and CMP in 3 months.    Chidi will be coming due for his day-to-day care.  He will return to see me in 6 months time or sooner if there is a problem.  I will continue to keep you informed of his progress.  He will call with any questions or concerns in the interim.            Thank you for allowing me to participate in the care of this patient.  If you have any questions please don't hesitate to contact me.    I spent 30 minutes on this date of service performing the following activities: obtaining history, performing examination, entering orders,  documenting, preparing for visit, obtaining / reviewing records, providing counseling and education and communicating results.     Chidi Pham MD [facility]   10/20/2023  11:57 AM

## 2023-10-20 NOTE — ASSESSMENT & PLAN NOTE
I have reviewed Chidi's lipids with him and I told him now that he has known coronary artery disease, this puts him in a different category in terms of where we want his LDL value to be.  I told him I want his LDL below 70.    He will continue on the rosuvastatin.  He will call and let me know if he is having persistent leg cramps.  If he does I will prescribe an alternative medication.  If not he will continue on the rosuvastatin and will get a repeat lipid profile and CMP in 3 months.

## 2024-04-17 LAB
BASOPHILS # BLD AUTO: 0.1 X10E3/UL (ref 0–0.2)
BASOPHILS NFR BLD AUTO: 1 %
EOSINOPHIL # BLD AUTO: 0.2 X10E3/UL (ref 0–0.4)
EOSINOPHIL NFR BLD AUTO: 3 %
ERYTHROCYTE [DISTWIDTH] IN BLOOD BY AUTOMATED COUNT: 12 % (ref 11.6–15.4)
HCT VFR BLD AUTO: 46.5 % (ref 37.5–51)
HGB BLD-MCNC: 15.9 G/DL (ref 13–17.7)
IMM GRANULOCYTES # BLD AUTO: 0 X10E3/UL (ref 0–0.1)
IMM GRANULOCYTES NFR BLD AUTO: 0 %
LYMPHOCYTES # BLD AUTO: 2.3 X10E3/UL (ref 0.7–3.1)
LYMPHOCYTES NFR BLD AUTO: 41 %
MCH RBC QN AUTO: 31.6 PG (ref 26.6–33)
MCHC RBC AUTO-ENTMCNC: 34.2 G/DL (ref 31.5–35.7)
MCV RBC AUTO: 92 FL (ref 79–97)
MONOCYTES # BLD AUTO: 0.6 X10E3/UL (ref 0.1–0.9)
MONOCYTES NFR BLD AUTO: 11 %
NEUTROPHILS # BLD AUTO: 2.5 X10E3/UL (ref 1.4–7)
NEUTROPHILS NFR BLD AUTO: 44 %
PLATELET # BLD AUTO: 238 X10E3/UL (ref 150–450)
RBC # BLD AUTO: 5.03 X10E6/UL (ref 4.14–5.8)
WBC # BLD AUTO: 5.6 X10E3/UL (ref 3.4–10.8)

## 2024-04-18 LAB
ALBUMIN SERPL-MCNC: 4.4 G/DL (ref 3.9–4.9)
ALBUMIN/GLOB SERPL: 2.1 {RATIO} (ref 1.2–2.2)
ALP SERPL-CCNC: 98 IU/L (ref 44–121)
ALT SERPL-CCNC: 27 IU/L (ref 0–44)
AST SERPL-CCNC: 30 IU/L (ref 0–40)
BILIRUB SERPL-MCNC: 0.5 MG/DL (ref 0–1.2)
BUN SERPL-MCNC: 13 MG/DL (ref 8–27)
BUN/CREAT SERPL: 13 (ref 10–24)
CALCIUM SERPL-MCNC: 9.2 MG/DL (ref 8.6–10.2)
CHLORIDE SERPL-SCNC: 103 MMOL/L (ref 96–106)
CHOLEST SERPL-MCNC: 131 MG/DL (ref 100–199)
CO2 SERPL-SCNC: 23 MMOL/L (ref 20–29)
CREAT SERPL-MCNC: 1 MG/DL (ref 0.76–1.27)
EGFRCR SERPLBLD CKD-EPI 2021: 85 ML/MIN/1.73
GLOBULIN SER CALC-MCNC: 2.1 G/DL (ref 1.5–4.5)
GLUCOSE SERPL-MCNC: 96 MG/DL (ref 70–99)
HBA1C MFR BLD: 5.5 % (ref 4.8–5.6)
HDLC SERPL-MCNC: 63 MG/DL
LDLC SERPL CALC-MCNC: 52 MG/DL (ref 0–99)
POTASSIUM SERPL-SCNC: 4.3 MMOL/L (ref 3.5–5.2)
PROT SERPL-MCNC: 6.5 G/DL (ref 6–8.5)
PSA SERPL-MCNC: 0.8 NG/ML (ref 0–4)
SODIUM SERPL-SCNC: 141 MMOL/L (ref 134–144)
TRIGL SERPL-MCNC: 86 MG/DL (ref 0–149)
VLDLC SERPL CALC-MCNC: 16 MG/DL (ref 5–40)

## 2024-04-24 ENCOUNTER — OFFICE VISIT (OUTPATIENT)
Dept: CARDIOLOGY | Facility: CLINIC | Age: 64
End: 2024-04-24
Payer: COMMERCIAL

## 2024-04-24 VITALS
SYSTOLIC BLOOD PRESSURE: 110 MMHG | HEIGHT: 70 IN | HEART RATE: 90 BPM | OXYGEN SATURATION: 97 % | DIASTOLIC BLOOD PRESSURE: 80 MMHG | BODY MASS INDEX: 26.92 KG/M2 | WEIGHT: 188 LBS

## 2024-04-24 DIAGNOSIS — I25.10 CORONARY ARTERY DISEASE INVOLVING NATIVE CORONARY ARTERY OF NATIVE HEART WITHOUT ANGINA PECTORIS: Primary | ICD-10-CM

## 2024-04-24 DIAGNOSIS — E78.2 MIXED HYPERLIPIDEMIA: ICD-10-CM

## 2024-04-24 LAB
ATRIAL RATE: 66
P AXIS: 71
PR INTERVAL: 144
QRS DURATION: 88
QT INTERVAL: 380
QTC CALCULATION(BAZETT): 398
R AXIS: 3
T WAVE AXIS: 117
VENTRICULAR RATE: 66

## 2024-04-24 PROCEDURE — 3008F BODY MASS INDEX DOCD: CPT | Performed by: INTERNAL MEDICINE

## 2024-04-24 PROCEDURE — 99214 OFFICE O/P EST MOD 30 MIN: CPT | Performed by: INTERNAL MEDICINE

## 2024-04-24 PROCEDURE — 93000 ELECTROCARDIOGRAM COMPLETE: CPT | Performed by: INTERNAL MEDICINE

## 2024-04-24 RX ORDER — ROSUVASTATIN CALCIUM 20 MG/1
20 TABLET, COATED ORAL DAILY
Qty: 90 TABLET | Refills: 3 | Status: SHIPPED | OUTPATIENT
Start: 2024-04-24 | End: 2025-01-07

## 2024-04-24 RX ORDER — CLOPIDOGREL BISULFATE 75 MG/1
75 TABLET ORAL DAILY
Qty: 90 TABLET | Refills: 3 | Status: SHIPPED | OUTPATIENT
Start: 2024-04-24 | End: 2025-01-07

## 2024-04-24 ASSESSMENT — ENCOUNTER SYMPTOMS
DIZZINESS: 0
IRREGULAR HEARTBEAT: 0
LIGHT-HEADEDNESS: 0
PND: 0
NEAR-SYNCOPE: 0
ORTHOPNEA: 0
SYNCOPE: 0
DYSPNEA ON EXERTION: 0
PALPITATIONS: 0

## 2024-04-24 NOTE — PATIENT INSTRUCTIONS
Patient Education   Mediterranean Diet  A Mediterranean diet refers to food and lifestyle choices that are based on the traditions of countries located on the [place]. It focuses on eating more fruits, vegetables, whole grains, beans, nuts, seeds, and heart-healthy fats, and eating less dairy, meat, eggs, and processed foods with added sugar, salt, and fat. This way of eating has been shown to help prevent certain conditions and improve outcomes for people who have chronic diseases, like kidney disease and heart disease.  What are tips for following this plan?  Reading food labels  Check the serving size of packaged foods. For foods such as rice and pasta, the serving size refers to the amount of cooked product, not dry.  Check the total fat in packaged foods. Avoid foods that have saturated fat or trans fats.  Check the ingredient list for added sugars, such as corn syrup.  Shopping    Buy a variety of foods that offer a balanced diet, including:  Fresh fruits and vegetables (produce).  Grains, beans, nuts, and seeds. Some of these may be available in unpackaged forms or large amounts (in bulk).  Fresh seafood.  Poultry and eggs.  Low-fat dairy products.  Buy whole ingredients instead of prepackaged foods.  Buy fresh fruits and vegetables in-season from local [place] markets.  Buy plain frozen fruits and vegetables.  If you do not have access to quality fresh seafood, buy precooked frozen shrimp or canned fish, such as tuna, salmon, or sardines.  Stock your pantry so you always have certain foods on hand, such as olive oil, canned tuna, canned tomatoes, rice, pasta, and beans.  Cooking  Cook foods with extra-virgin olive oil instead of using butter or other vegetable oils.  Have meat as a side dish, and have vegetables or grains as your main dish. This means having meat in small portions or adding small amounts of meat to foods like pasta or stew.  Use beans or vegetables instead of meat in common dishes  like chili or lasagna.  [place] with different cooking methods. Try roasting, broiling, steaming, and sautéing vegetables.  Add frozen vegetables to soups, stews, pasta, or rice.  Add nuts or seeds for added healthy fats and plant protein at each meal. You can add these to yogurt, salads, or vegetable dishes.  Marinate fish or vegetables using olive oil, lemon juice, garlic, and fresh herbs.  Meal planning  Plan to eat one vegetarian meal one day each week. Try to work up to two vegetarian meals, if possible.  Eat seafood two or more times a week.  Have healthy snacks readily available, such as:  Vegetable sticks with hummus.  Greek yogurt.  Fruit and nut trail mix.  Eat balanced meals throughout the week. This includes:  Fruit: 2-3 servings a day.  Vegetables: 4-5 servings a day.  Low-fat dairy: 2 servings a day.  Fish, poultry, or lean meat: 1 serving a day.  Beans and legumes: 2 or more servings a week.  Nuts and seeds: 1-2 servings a day.  Whole grains: 6-8 servings a day.  Extra-virgin olive oil: 3-4 servings a day.  Limit red meat and sweets to only a few servings a month.  Lifestyle    Cook and eat meals together with your family, when possible.  Drink enough fluid to keep your urine pale yellow.  Be physically active every day. This includes:  Aerobic exercise like running or swimming.  Leisure activities like gardening, walking, or housework.  Get 7-8 hours of sleep each night.  If recommended by your health care provider, drink red wine in moderation. This means 1 glass a day for nonpregnant women and 2 glasses a day for men. A glass of wine equals 5 oz (150 mL).  What foods should I eat?  Fruits  Apples. Apricots. Avocado. Berries. Bananas. Cherries. Dates. Figs. Grapes. Rosey. Melon. Oranges. Peaches. Plums. Pomegranate.  Vegetables  Artichokes. Beets. Broccoli. Cabbage. Carrots. Eggplant. Green beans. Chard. Kale. Spinach. Onions. Leeks. Peas. Squash. Tomatoes. Peppers.  Radishes.  Grains  Whole-grain pasta. Brown rice. Bulgur wheat. Polenta. Couscous. Whole-wheat bread. Oatmeal. Quinoa.  Meats and other proteins  Beans. Almonds. Sunflower seeds. Pine nuts. Peanuts. Cod. [place]. Scallops. Shrimp. Tuna. Tilapia. Clams. Oysters. Eggs. Poultry without skin.  Dairy  Low-fat milk. Cheese. Greek yogurt.  Fats and oils  Extra-virgin olive oil. Avocado oil. Grapeseed oil.  Beverages  Water. Red wine. Herbal tea.  Sweets and desserts  Greek yogurt with honey. Baked apples. Poached pears. Trail mix.  Seasonings and condiments  Basil. Cilantro. Coriander. Cumin. Mint. Parsley. Celso. Rosemary. Tarragon. Garlic. Oregano. Thyme. Pepper. Balsamic vinegar. Tahini. Hummus. Tomato sauce. Olives. Mushrooms.  The items listed above may not be a complete list of foods and beverages you can eat. Contact a dietitian for more information.  What foods should I limit?  This is a list of foods that should be eaten rarely or only on special occasions.  Fruits  Fruit canned in syrup.  Vegetables  Deep-fried potatoes (french fries).  Grains  Prepackaged pasta or rice dishes. Prepackaged cereal with added sugar. Prepackaged snacks with added sugar.  Meats and other proteins  Beef. Pork. Lamb. Poultry with skin. Hot dogs. [place].  Dairy  Ice cream. Sour cream. Whole milk.  Fats and oils  Butter. Canola oil. Vegetable oil. Beef fat (tallow). Lard.  Beverages  Juice. Sugar-sweetened soft drinks. Beer. Liquor and spirits.  Sweets and desserts  Cookies. Cakes. Pies. Candy.  Seasonings and condiments  Mayonnaise. Pre-made sauces and marinades.  The items listed above may not be a complete list of foods and beverages you should limit. Contact a dietitian for more information.  Summary  The Mediterranean diet includes both food and lifestyle choices.  Eat a variety of fresh fruits and vegetables, beans, nuts, seeds, and whole grains.  Limit the amount of red meat and sweets that you eat.  If recommended by your health  care provider, drink red wine in moderation. This means 1 glass a day for nonpregnant women and 2 glasses a day for men. A glass of wine equals 5 oz (150 mL).  This information is not intended to replace advice given to you by your health care provider. Make sure you discuss any questions you have with your health care provider.  Document Revised: 01/22/2021 Document Reviewed: 11/19/2020  Elsevier Patient Education © 2023 Else[employer] Inc.

## 2024-04-24 NOTE — ASSESSMENT & PLAN NOTE
In July, it will be 1 year since Chidi had his robotic CABG with a LIMA placed to his LAD.  He has been doing well without any anginal symptoms.    I told him at the end of July, he can discontinue his clopidogrel as it will have been 1 year since his surgery.    He will continue on the aspirin and high intensity statin.

## 2024-04-24 NOTE — PROGRESS NOTES
Cardiology Note       Reason for visit: Coronary artery disease      Chidi returns today for follow-up.  He has been doing well without any chest discomfort, shortness of breath, palpitations or dizziness.  He has been golfing and riding the Peloton without any difficulty.  He has not had any effort related symptoms.  No dizziness, near-syncope or syncope.  No recent hospitalizations or emergency room visits.            Past Medical History:   Diagnosis Date    Mixed hyperlipidemia 6/13/2023    Palpitations 06/12/2023    SOB (shortness of breath)      Past Surgical History:   Procedure Laterality Date    APPENDECTOMY      2019    CORONARY ARTERY BYPASS GRAFT  07/18/2023    robotic     Social History     Tobacco Use    Smoking status: Never    Smokeless tobacco: Never   Vaping Use    Vaping Use: Never used   Substance Use Topics    Alcohol use: Yes     Comment: rare now    Drug use: Never      Family History   Problem Relation Age of Onset    Multiple sclerosis Biological Mother     Hyperlipidemia Biological Father     Hypertension Biological Father     Diabetes Biological Father     Heart disease Biological Father     Diabetes Biological Sister     No Known Problems Biological Sister     No Known Problems Biological Sister     No Known Problems Biological Sister     No Known Problems Biological Brother     Colon cancer Neg Hx     Prostate cancer Neg Hx      Augmentin [amoxicillin-pot clavulanate]  Current Outpatient Medications   Medication Instructions    acetylcysteine (NAC ORAL) 1,000 mg, oral, Daily    ascorbic acid/multivit-min (EMERGEN-C ORAL) 1 packet, oral, Daily    aspirin 81 mg, oral, Daily    cholecalciferol (vitamin D3) 1,000 Units, oral, Daily    clopidogreL (PLAVIX) 75 mg, oral, Daily    mv-min/vit C/glut/lysine/hb124 (IMMUNE SUPPORT ORAL) 2 tablets, oral, Daily    rosuvastatin (CRESTOR) 20 mg, oral, Daily    sildenafiL (VIAGRA) 25 mg, oral, Daily PRN    TURMERIC ROOT-RUPERT ROOT EXT ORAL 1  tablet, oral, Daily    vitamin B complex (B COMPLEX ORAL) 1 tablet, oral, Daily          Review of Systems   Cardiovascular:  Negative for chest pain, dyspnea on exertion, irregular heartbeat, leg swelling, near-syncope, orthopnea, palpitations, paroxysmal nocturnal dyspnea and syncope.   Neurological:  Negative for dizziness and light-headedness.      Objective    Vitals:    04/24/24 0934   BP: 110/80   Pulse: 90   SpO2: 97%      Wt Readings from Last 3 Encounters:   04/24/24 85.3 kg (188 lb)   10/20/23 85.7 kg (189 lb)   10/13/23 85.9 kg (189 lb 6.4 oz)      Physical Exam  Neck:      Vascular: No carotid bruit or JVD.   Cardiovascular:      Rate and Rhythm: Normal rate and regular rhythm.      Pulses:           Carotid pulses are 2+ on the right side and 2+ on the left side.       Dorsalis pedis pulses are 2+ on the right side and 2+ on the left side.        Posterior tibial pulses are 2+ on the right side and 2+ on the left side.      Heart sounds: S1 normal and S2 normal. No murmur heard.     No friction rub. No gallop.   Pulmonary:      Effort: Pulmonary effort is normal.      Breath sounds: Normal breath sounds.   Musculoskeletal:      Right lower leg: No edema.      Left lower leg: No edema.   Skin:     General: Skin is warm and dry.   Neurological:      Mental Status: He is alert.   Psychiatric:         Behavior: Behavior normal.                   Transthoracic echocardiogram.  8/15/2023    Left Ventricle: Normal ventricle size. Normal wall thickness. Preserved systolic function. Estimated EF 55-60%. Abnormal septal motion consistent with post-operative status. Grade I diastolic dysfunction.    Right Ventricle: Normal ventricle size. Normal systolic function.    Left Atrium: Normal sized atrium.    Right Atrium: Normal sized atrium.    Mitral Valve: Normal leaflet structure. Normal leaflet motion. No regurgitation. No stenosis.    Aortic Valve: Tricuspid valve. No regurgitation. No stenosis.    Tricuspid  Valve: Normal structure. Jet is insufficient to calculate pulmonary artery pressure. Trace regurgitation.    Aorta: Aortic root normal. Sinuses of Valsalva normal-sized. Ascending aorta normal-sized.    Compared to the echocardiogram dated 7/6/2023: Abnormal septal motion is now present.      Robotic CABG.  7/18/2023  Operative Procedures Performed  OP CAB MID/THORACOTOMY CAPO ELENA Dr.        Carotid Dopplers.  7/6/2023  IMPRESSION:  Minimal plaque within the internal carotid arteries, causing less than 50%  stenosis.        Transthoracic echocardiogram.  7/6/2023  The left ventricular cavity size is normal with normal wall thickness and preserved systolic function. EF 55% by visual estimate. Difficult to assess regional wall motions despite use of Definity-Grossly normal wall motion. Normal diastolic function for age.  Aortic valve cusps poorly visualized. The visualized portions of the aortic root and ascending aorta are normal in size.  No aortic coarctation.  No aortic insufficiency or stenosis.  The mitral valve is normal. Trace mitral regurgitation.   The left atrium is normal in size.   Limited visualization but the right ventricle appears normal size with preserved systolic function.   The pulmonic valve is not well seen.   The tricuspid is normal in appearance. Trace tricuspid regurgitation with insufficient jet to estimate RVSP.    Right atrium is not well seen.  The IVC is small and collapses > 50% with inspiration consistent with normal right atrial pressures.  No pericardial effusion.   No prior study for comparison.           Left heart cath and coronary angiography.  7/6/2023  Coronary Angiogram/Left Heart Catheterization   100% ostial LAD chronic total occlusion with left to left and right to left collaterals  Mild nonobstructive disease in the remaining coronary tree  LVEDP 13 mmHg        Exercise nuclear stress test.  6/19/2023.  This is an abnormal exercise nuclear stress  test.  EKG with 2-2.5 mm upsloping ST depression in the inferolateral leads starting at 6:16 of exercise.  Lateral lead ST depression appears slightly more horizontal.  This persisted until approximately 2 minutes of recovery.  No exercise-induced arrhythmia.  Good exercise capacity.  Perfusion images are technically difficult secondary to artifact.  There is a moderate-sized area of moderately to severely reduced isotope uptake in the anteroseptum with mild reversibility at rest.  There is a small area of moderately reduced isotope uptake in the apical septum with mild improvement at rest.  The inferior wall has mildly reduced uptake on resting images only.  LVEF 55%.  Grossly normal wall motion.  Overall findings are suggestive of ischemia in the territory of the left anterior descending coronary artery.       ECG.  Normal sinus rhythm, nonspecific ST-T wave abnormality.  No change compared to previous EKG.     Assessment/Plan    Coronary artery disease involving native coronary artery of native heart  In July, it will be 1 year since Chidi had his robotic CABG with a LIMA placed to his LAD.  He has been doing well without any anginal symptoms.    I told him at the end of July, he can discontinue his clopidogrel as it will have been 1 year since his surgery.    He will continue on the aspirin and high intensity statin.    Mixed hyperlipidemia  Continue rosuvastatin 20 mg daily.  His recent lipid profile revealed a total cholesterol of 131, triglycerides 86, HDL 63 and LDL was 52.    LDL goal of less than 55.    Chidi had a LDL of 154 in 2019 and it is markedly reduced with the 20 mg of rosuvastatin.    I encouraged him to continue his exercise regimen and have counseled on a low-fat low-cholesterol Mediterranean diet.    Overall, Chidi continues to do well.  He will be coming due for his day-to-day care.    I have asked him to return to see me in 6 months time or sooner if there is a problem.  I will  continue to keep you informed of his progress.  He will call with any questions or concerns in the interim.            Thank you for allowing me to participate in the care of this patient.  If you have any questions please don't hesitate to contact me.    I spent 30 minutes on this date of service performing the following activities: obtaining history, performing examination, entering orders, documenting, preparing for visit, obtaining / reviewing records, providing counseling and education and communicating results.     Chidi Pham MD [facility]   4/24/2024  10:16 AM

## 2024-04-24 NOTE — ASSESSMENT & PLAN NOTE
Continue rosuvastatin 20 mg daily.  His recent lipid profile revealed a total cholesterol of 131, triglycerides 86, HDL 63 and LDL was 52.    LDL goal of less than 55.    Chidi had a LDL of 154 in 2019 and it is markedly reduced with the 20 mg of rosuvastatin.    I encouraged him to continue his exercise regimen and have counseled on a low-fat low-cholesterol Mediterranean diet.

## 2024-04-24 NOTE — LETTER
April 24, 2024     Melody Murphy MD  [address] [address]    Patient: Chidi Ramsey  YOB: 1960  Date of Visit: 4/24/2024      Dear Dr. Murphy:    Thank you for referring Chidi Ramsey to me for evaluation. Below are my notes for this consultation.    If you have questions, please do not hesitate to call me. I look forward to following your patient along with you.         Sincerely,        Chidi Pham MD        CC: No Recipients    Chidi Pham MD  4/24/2024 10:16 AM  Sign when Signing Visit     Cardiology Note       Reason for visit: Coronary artery disease      Chidi returns today for follow-up.  He has been doing well without any chest discomfort, shortness of breath, palpitations or dizziness.  He has been golfing and riding the Peloton without any difficulty.  He has not had any effort related symptoms.  No dizziness, near-syncope or syncope.  No recent hospitalizations or emergency room visits.            Past Medical History:   Diagnosis Date   • Mixed hyperlipidemia 6/13/2023   • Palpitations 06/12/2023   • SOB (shortness of breath)      Past Surgical History:   Procedure Laterality Date   • APPENDECTOMY      2019   • CORONARY ARTERY BYPASS GRAFT  07/18/2023    robotic     Social History     Tobacco Use   • Smoking status: Never   • Smokeless tobacco: Never   Vaping Use   • Vaping Use: Never used   Substance Use Topics   • Alcohol use: Yes     Comment: rare now   • Drug use: Never      Family History   Problem Relation Age of Onset   • Multiple sclerosis Biological Mother    • Hyperlipidemia Biological Father    • Hypertension Biological Father    • Diabetes Biological Father    • Heart disease Biological Father    • Diabetes Biological Sister    • No Known Problems Biological Sister    • No Known Problems Biological Sister    • No Known Problems Biological Sister    • No Known Problems Biological Brother    • Colon cancer  Neg Hx    • Prostate cancer Neg Hx      Augmentin [amoxicillin-pot clavulanate]  Current Outpatient Medications   Medication Instructions   • acetylcysteine (NAC ORAL) 1,000 mg, oral, Daily   • ascorbic acid/multivit-min (EMERGEN-C ORAL) 1 packet, oral, Daily   • aspirin 81 mg, oral, Daily   • cholecalciferol (vitamin D3) 1,000 Units, oral, Daily   • clopidogreL (PLAVIX) 75 mg, oral, Daily   • mv-min/vit C/glut/lysine/hb124 (IMMUNE SUPPORT ORAL) 2 tablets, oral, Daily   • rosuvastatin (CRESTOR) 20 mg, oral, Daily   • sildenafiL (VIAGRA) 25 mg, oral, Daily PRN   • TURMERIC ROOT-RUPERT ROOT EXT ORAL 1 tablet, oral, Daily   • vitamin B complex (B COMPLEX ORAL) 1 tablet, oral, Daily          Review of Systems   Cardiovascular:  Negative for chest pain, dyspnea on exertion, irregular heartbeat, leg swelling, near-syncope, orthopnea, palpitations, paroxysmal nocturnal dyspnea and syncope.   Neurological:  Negative for dizziness and light-headedness.      Objective    Vitals:    04/24/24 0934   BP: 110/80   Pulse: 90   SpO2: 97%      Wt Readings from Last 3 Encounters:   04/24/24 85.3 kg (188 lb)   10/20/23 85.7 kg (189 lb)   10/13/23 85.9 kg (189 lb 6.4 oz)      Physical Exam  Neck:      Vascular: No carotid bruit or JVD.   Cardiovascular:      Rate and Rhythm: Normal rate and regular rhythm.      Pulses:           Carotid pulses are 2+ on the right side and 2+ on the left side.       Dorsalis pedis pulses are 2+ on the right side and 2+ on the left side.        Posterior tibial pulses are 2+ on the right side and 2+ on the left side.      Heart sounds: S1 normal and S2 normal. No murmur heard.     No friction rub. No gallop.   Pulmonary:      Effort: Pulmonary effort is normal.      Breath sounds: Normal breath sounds.   Musculoskeletal:      Right lower leg: No edema.      Left lower leg: No edema.   Skin:     General: Skin is warm and dry.   Neurological:      Mental Status: He is alert.   Psychiatric:          Behavior: Behavior normal.                   Transthoracic echocardiogram.  8/15/2023  •  Left Ventricle: Normal ventricle size. Normal wall thickness. Preserved systolic function. Estimated EF 55-60%. Abnormal septal motion consistent with post-operative status. Grade I diastolic dysfunction.  •  Right Ventricle: Normal ventricle size. Normal systolic function.  •  Left Atrium: Normal sized atrium.  •  Right Atrium: Normal sized atrium.  •  Mitral Valve: Normal leaflet structure. Normal leaflet motion. No regurgitation. No stenosis.  •  Aortic Valve: Tricuspid valve. No regurgitation. No stenosis.  •  Tricuspid Valve: Normal structure. Jet is insufficient to calculate pulmonary artery pressure. Trace regurgitation.  •  Aorta: Aortic root normal. Sinuses of Valsalva normal-sized. Ascending aorta normal-sized.  •  Compared to the echocardiogram dated 7/6/2023: Abnormal septal motion is now present.      Robotic CABG.  7/18/2023  Operative Procedures Performed  OP CAB MID/THORACOTOMY ELENA, CAPO [place]        Carotid Dopplers.  7/6/2023  IMPRESSION:  Minimal plaque within the internal carotid arteries, causing less than 50%  stenosis.        Transthoracic echocardiogram.  7/6/2023  The left ventricular cavity size is normal with normal wall thickness and preserved systolic function. EF 55% by visual estimate. Difficult to assess regional wall motions despite use of Definity-Grossly normal wall motion. Normal diastolic function for age.  Aortic valve cusps poorly visualized. The visualized portions of the aortic root and ascending aorta are normal in size.  No aortic coarctation.  No aortic insufficiency or stenosis.  The mitral valve is normal. Trace mitral regurgitation.   The left atrium is normal in size.   Limited visualization but the right ventricle appears normal size with preserved systolic function.   The pulmonic valve is not well seen.   The tricuspid is normal in appearance. Trace tricuspid  regurgitation with insufficient jet to estimate RVSP.    Right atrium is not well seen.  The IVC is small and collapses > 50% with inspiration consistent with normal right atrial pressures.  No pericardial effusion.   No prior study for comparison.           Left heart cath and coronary angiography.  7/6/2023  Coronary Angiogram/Left Heart Catheterization   100% ostial LAD chronic total occlusion with left to left and right to left collaterals  Mild nonobstructive disease in the remaining coronary tree  LVEDP 13 mmHg        Exercise nuclear stress test.  6/19/2023.  This is an abnormal exercise nuclear stress test.  EKG with 2-2.5 mm upsloping ST depression in the inferolateral leads starting at 6:16 of exercise.  Lateral lead ST depression appears slightly more horizontal.  This persisted until approximately 2 minutes of recovery.  No exercise-induced arrhythmia.  Good exercise capacity.  Perfusion images are technically difficult secondary to artifact.  There is a moderate-sized area of moderately to severely reduced isotope uptake in the anteroseptum with mild reversibility at rest.  There is a small area of moderately reduced isotope uptake in the apical septum with mild improvement at rest.  The inferior wall has mildly reduced uptake on resting images only.  LVEF 55%.  Grossly normal wall motion.  Overall findings are suggestive of ischemia in the territory of the left anterior descending coronary artery.       ECG.  Normal sinus rhythm, nonspecific ST-T wave abnormality.  No change compared to previous EKG.     Assessment/Plan    Coronary artery disease involving native coronary artery of native heart  In July, it will be 1 year since Chidi had his robotic CABG with a LIMA placed to his LAD.  He has been doing well without any anginal symptoms.    I told him at the end of July, he can discontinue his clopidogrel as it will have been 1 year since his surgery.    He will continue on the aspirin and high  intensity statin.    Mixed hyperlipidemia  Continue rosuvastatin 20 mg daily.  His recent lipid profile revealed a total cholesterol of 131, triglycerides 86, HDL 63 and LDL was 52.    LDL goal of less than 55.    Chidi had a LDL of 154 in 2019 and it is markedly reduced with the 20 mg of rosuvastatin.    I encouraged him to continue his exercise regimen and have counseled on a low-fat low-cholesterol Mediterranean diet.    Overall, Chidi continues to do well.  He will be coming due for his day-to-day care.    I have asked him to return to see me in 6 months time or sooner if there is a problem.  I will continue to keep you informed of his progress.  He will call with any questions or concerns in the interim.            Thank you for allowing me to participate in the care of this patient.  If you have any questions please don't hesitate to contact me.    I spent 30 minutes on this date of service performing the following activities: obtaining history, performing examination, entering orders, documenting, preparing for visit, obtaining / reviewing records, providing counseling and education and communicating results.     Chidi Pham MD [facility]   4/24/2024  10:16 AM

## 2024-04-30 ENCOUNTER — OFFICE VISIT (OUTPATIENT)
Dept: FAMILY MEDICINE | Facility: CLINIC | Age: 64
End: 2024-04-30
Payer: COMMERCIAL

## 2024-04-30 VITALS
HEIGHT: 70 IN | WEIGHT: 188.5 LBS | BODY MASS INDEX: 26.99 KG/M2 | HEART RATE: 77 BPM | SYSTOLIC BLOOD PRESSURE: 126 MMHG | TEMPERATURE: 97.5 F | DIASTOLIC BLOOD PRESSURE: 74 MMHG | OXYGEN SATURATION: 98 %

## 2024-04-30 DIAGNOSIS — E78.2 MIXED HYPERLIPIDEMIA: ICD-10-CM

## 2024-04-30 DIAGNOSIS — I25.10 CORONARY ARTERY DISEASE INVOLVING NATIVE CORONARY ARTERY OF NATIVE HEART WITHOUT ANGINA PECTORIS: Primary | ICD-10-CM

## 2024-04-30 DIAGNOSIS — Z95.1 S/P CABG X 1: ICD-10-CM

## 2024-04-30 DIAGNOSIS — L40.9 PSORIASIS: ICD-10-CM

## 2024-04-30 PROCEDURE — 99213 OFFICE O/P EST LOW 20 MIN: CPT | Performed by: FAMILY MEDICINE

## 2024-04-30 PROCEDURE — 3008F BODY MASS INDEX DOCD: CPT | Performed by: FAMILY MEDICINE

## 2024-04-30 RX ORDER — TRIAMCINOLONE ACETONIDE 1 MG/G
1 OINTMENT TOPICAL 2 TIMES DAILY
Qty: 30 G | Refills: 1 | Status: SHIPPED | OUTPATIENT
Start: 2024-04-30 | End: 2025-01-07

## 2024-04-30 ASSESSMENT — ENCOUNTER SYMPTOMS
PALPITATIONS: 0
COUGH: 0
FEVER: 0
SHORTNESS OF BREATH: 0
CHILLS: 0

## 2024-04-30 NOTE — PROGRESS NOTES
Subjective    Chidi Ramsey is a 63 y.o. male presenting today for: Follow-up      HPI: 63yo male with h/o CAD s/p CABG 2023, HL here for follow-up of multiple medical problems as listed below.    CAD: He is doing well. No exertional symptoms.  Taking aspirin, plavix, statin.  [employer] cycling classes, golf. Weight training.      Tolerating statin well.    Lump on chest wall he has noticed since his surgery. Not painful, just more prominent than the other side.     Also intermittent rash on low back. Comes and goes. Not terribly itchy. No other spots.    HM:  Colonoscopy UTD.   Exercises regularly.  Continuing to work on diet.   Due for a few vaccines as below.      Health Maintenance Due   Topic Date Due    Colorectal Cancer Screening  Never done    HIV Screening  Never done    DTaP, Tdap, and Td Vaccines (1 - Tdap) Never done    Zoster Vaccine (1 of 2) Never done    RSV (60+ years old [shared decision making] or in pregnancy during 32 through 36 weeks) (1 - 1-dose 60+ series) Never done    COVID-19 Vaccine (4 - 2023-24 season) 09/01/2023         Patient Active Problem List   Diagnosis    Mixed hyperlipidemia    Coronary artery disease involving native coronary artery of native heart    Post pericardiotomy syndrome    S/P CABG x 1    Erectile dysfunction due to arterial insufficiency    Psoriasis          Current Outpatient Medications:     acetylcysteine (NAC ORAL), Take 1,000 mg by mouth daily., Disp: , Rfl:     ascorbic acid/multivit-min (EMERGEN-C ORAL), Take 1 packet by mouth daily., Disp: , Rfl:     aspirin 81 mg chewable tablet, Take 1 tablet (81 mg total) by mouth daily., Disp: 90 tablet, Rfl: 3    cholecalciferol, vitamin D3, 1,000 unit (25 mcg) tablet, Take 1,000 Units by mouth daily., Disp: , Rfl:     clopidogreL (PLAVIX) 75 mg tablet, Take 1 tablet (75 mg total) by mouth daily., Disp: 90 tablet, Rfl: 3    mv-min/vit C/glut/lysine/hb124 (IMMUNE SUPPORT ORAL), Take 2 tablets by mouth daily., Disp: ,  "Rfl:     rosuvastatin (CRESTOR) 20 mg tablet, Take 1 tablet (20 mg total) by mouth daily., Disp: 90 tablet, Rfl: 3    triamcinolone (KENALOG) 0.1 % ointment, Apply 1 Application topically 2 (two) times a day., Disp: 30 g, Rfl: 1    TURMERIC ROOT-GINGER ROOT EXT ORAL, Take 1 tablet by mouth daily., Disp: , Rfl:     vitamin B complex (B COMPLEX ORAL), Take 1 tablet by mouth daily., Disp: , Rfl:     sildenafiL (VIAGRA) 25 mg tablet, Take 1 tablet (25 mg total) by mouth daily as needed for erectile dysfunction., Disp: 30 tablet, Rfl: 3     Allergies   Allergen Reactions    Augmentin [Amoxicillin-Pot Clavulanate] Other (see comments)     Stomach pain           Review of Systems   Constitutional:  Negative for chills and fever.   Respiratory:  Negative for cough and shortness of breath.    Cardiovascular:  Negative for chest pain and palpitations.        Objective  Visit Vitals  /74 (BP Location: Right upper arm, Patient Position: Sitting)   Pulse 77   Temp 36.4 °C (97.5 °F) (Temporal)   Ht 1.778 m (5' 10\")   Wt 85.5 kg (188 lb 8 oz)   SpO2 98%   BMI 27.05 kg/m²    Body mass index is 27.05 kg/m².    Wt Readings from Last 3 Encounters:   04/30/24 85.5 kg (188 lb 8 oz)   04/24/24 85.3 kg (188 lb)   10/20/23 85.7 kg (189 lb)       BP Readings from Last 3 Encounters:   04/30/24 126/74   04/24/24 110/80   10/20/23 110/82            Physical Exam  Vitals reviewed.   Constitutional:       Appearance: Normal appearance. He is not ill-appearing.   HENT:      Head: Normocephalic and atraumatic.      Right Ear: External ear normal.      Left Ear: External ear normal.   Eyes:      Conjunctiva/sclera: Conjunctivae normal.      Pupils: Pupils are equal, round, and reactive to light.   Cardiovascular:      Rate and Rhythm: Normal rate and regular rhythm.      Heart sounds: No murmur heard.  Pulmonary:      Effort: Pulmonary effort is normal.      Breath sounds: Normal breath sounds. No wheezing, rhonchi or rales. "   Musculoskeletal:      Comments: Left chest wall prominence is c/w rib. More prominent than right side. No discrete subcutaneous mass.   Skin:     General: Skin is warm and dry.      Findings: Rash (2cm plaque, erythematous with overlying scale left lower back. Small area of excoriation. Sharp borders.) present.   Neurological:      General: No focal deficit present.      Mental Status: He is alert and oriented to person, place, and time.   Psychiatric:         Mood and Affect: Mood normal.         Behavior: Behavior normal.              Laboratories    CollectedUpdatedProcedure  04/17/2024 080404/17/2024 2205  CBC and differential [[id number]]    Blood, Venous   Component Value Units   WBC 5.6 x10E3/uL   RBC 5.03 x10E6/uL   Hemoglobin 15.9 g/dL   Hematocrit 46.5 %   MCV 92 fL   MCH 31.6 pg   MCHC 34.2 g/dL   RDW 12.0 %   Platelets 238 x10E3/uL   Neutrophils 44 %   Lymphs 41 %   Monocytes 11 %   Eos 3 %   Basos 1 %   Neutrophils (Absolute) 2.5 x10E3/uL   Lymphs (Absolute) 2.3 x10E3/uL   Monocytes(Absolute) 0.6 x10E3/uL   Eos (Absolute) 0.2 x10E3/uL   Baso (Absolute) 0.1 x10E3/uL   Immature Granulocytes 0 %   Immature Grans (Abs) 0.0 x10E3/uL   04/17/2024 080404/18/2024 0006  Hemoglobin A1c [[id number]]    Blood, Venous   Component Value Units   Hemoglobin A1c 5.5  %   04/17/2024 080404/18/2024 0006  PSA [[id number]]    Blood, Venous   Component Value Units   Prostate Specific Ag, Serum 0.8  ng/mL   04/17/2024 080404/18/2024 0006  Lipid panel [[id number]]    Blood, Venous   Component Value Units   Cholesterol, Total 131 mg/dL   Triglycerides 86 mg/dL   HDL Cholesterol 63 mg/dL   VLDL Cholesterol Dario 16 mg/dL   LDL Cholesterol Calc 52 mg/dL   04/17/2024 080404/18/2024 0006  Comprehensive metabolic panel [[id number]]    Blood, Venous   Component Value Units   Glucose, Serum 96 mg/dL   BUN 13 mg/dL   Creatinine, Serum 1.00 mg/dL   eGFR 85 mL/min/1.73   BUN/Creatinine Ratio 13    Sodium, Serum 141 mmol/L   Potassium, Serum  4.3 mmol/L   Chloride, Serum 103 mmol/L   Carbon Dioxide, Total 23 mmol/L   Calcium, Serum 9.2 mg/dL   Protein, Total, Serum 6.5 g/dL   Albumin, Serum 4.4 g/dL   Globulin, Total 2.1 g/dL   A/G Ratio 2.1    Bilirubin, Total 0.5 mg/dL   Alkaline Phosphatase, S 98 IU/L   AST (SGOT) 30 IU/L   ALT (SGPT) 27 IU/L           Imaging/Studies         Assessment/Plan  Problem List Items Addressed This Visit       Mixed hyperlipidemia     Well controlled, continue current medications and lifestyle measures.           Coronary artery disease involving native coronary artery of native heart - Primary     Doing very well.  Back to exercise and full activity.   Continue asa, plavix (through 1 year anniversary of CABG), statin.  Continue working on diet.  Routine cardiology f/u.         S/P CABG x 1    Psoriasis     Topical steroid bid for 2 weeks.  Alert me if not improving.         Relevant Medications    triamcinolone (KENALOG) 0.1 % ointment         He will consider tdap, shingrix, and RSV vaccines.    Will call [facility] to obtain colonoscopy records.    Return in about 6 months (around 10/30/2024).     Melody Murphy MD

## 2024-04-30 NOTE — ASSESSMENT & PLAN NOTE
Doing very well.  Back to exercise and full activity.   Continue asa, plavix (through 1 year anniversary of CABG), statin.  Continue working on diet.  Routine cardiology f/u.

## 2024-05-10 ENCOUNTER — TELEPHONE (OUTPATIENT)
Dept: FAMILY MEDICINE | Facility: CLINIC | Age: 64
End: 2024-05-10
Payer: COMMERCIAL

## 2024-05-10 NOTE — TELEPHONE ENCOUNTER
Patient was taveling flew home this morning. Reports symtoms started yesterday worse today.    Thinks he has a sinus infections pressure pain ear fullness green nasal discharge new onset cough body aches and chills.     Denies chest pain sob.    Requesting abx. Advised him to take a covid test he noted this does not feel covid, he has covid before and this does not feel like it   This feels like a sinus infection/ head cold   Offered sds appt, declined due to work meetings.  I advised  would likely want you to be evaluated  or go to Urgent care.     Requested message to be sent to his provider.

## 2024-05-10 NOTE — TELEPHONE ENCOUNTER
Pt calling because he was away for work and when he got back he wasn't feeling well stated that he thinks is a sinus infection and would like something prescribed please call pt @ [phone]

## 2024-05-10 NOTE — TELEPHONE ENCOUNTER
Dicussed supportive care measures     Hydrate way  more than normal - this will help loosen the mucous to cough and clear you out  and will help soothe your throat. You can use lozenges as well.   Use a humidifier in the room .       You can take the following OTC medications to help ease your symptoms        - corcidin  - Flonase/Fluticasone for congestion, sinus pressure and post-nasal drip  - Saline spray or saline sinus rinses  - Tylenol/Motrin as needed for aches, pains, sore throat            Dicussed use of urgent care over the weekend.   Advised to schedule visit  if symptoms do not improve.

## 2024-05-28 ENCOUNTER — APPOINTMENT (OUTPATIENT)
Dept: CARDIOLOGY | Facility: CLINIC | Age: 64
End: 2024-05-28
Payer: COMMERCIAL

## 2024-05-28 ENCOUNTER — TELEPHONE (OUTPATIENT)
Dept: CARDIOLOGY | Facility: CLINIC | Age: 64
End: 2024-05-28
Payer: COMMERCIAL

## 2024-05-28 DIAGNOSIS — R00.2 PALPITATIONS: Primary | ICD-10-CM

## 2024-05-28 NOTE — TELEPHONE ENCOUNTER
Message left on Chidi's voice mail to inform him to call the office [phone], ask for Kerri GREENE to have a Zio cardiac monitor placed today or tomorrow per Dr Pham request. Awaiting call back.

## 2024-06-20 DIAGNOSIS — R00.2 PALPITATIONS: ICD-10-CM

## 2024-07-12 ENCOUNTER — HOSPITAL ENCOUNTER (OUTPATIENT)
Dept: RADIOLOGY | Age: 64
Discharge: HOME | End: 2024-07-12
Attending: INTERNAL MEDICINE
Payer: COMMERCIAL

## 2024-07-12 DIAGNOSIS — R05.9 COUGH, UNSPECIFIED TYPE: ICD-10-CM

## 2024-07-12 DIAGNOSIS — I97.0 POST PERICARDIOTOMY SYNDROME: ICD-10-CM

## 2024-07-12 DIAGNOSIS — I25.118 CORONARY ARTERY DISEASE INVOLVING NATIVE CORONARY ARTERY OF NATIVE HEART WITH OTHER FORM OF ANGINA PECTORIS: ICD-10-CM

## 2024-07-12 PROCEDURE — 71046 X-RAY EXAM CHEST 2 VIEWS: CPT

## 2024-09-10 ENCOUNTER — APPOINTMENT (RX ONLY)
Dept: URBAN - METROPOLITAN AREA CLINIC 26 | Facility: CLINIC | Age: 64
Setting detail: DERMATOLOGY
End: 2024-09-10

## 2024-09-10 DIAGNOSIS — D22 MELANOCYTIC NEVI: ICD-10-CM

## 2024-09-10 DIAGNOSIS — D18.0 HEMANGIOMA: ICD-10-CM

## 2024-09-10 DIAGNOSIS — L82.1 OTHER SEBORRHEIC KERATOSIS: ICD-10-CM

## 2024-09-10 DIAGNOSIS — D485 NEOPLASM OF UNCERTAIN BEHAVIOR OF SKIN: ICD-10-CM

## 2024-09-10 DIAGNOSIS — L81.4 OTHER MELANIN HYPERPIGMENTATION: ICD-10-CM

## 2024-09-10 PROBLEM — D22.61 MELANOCYTIC NEVI OF RIGHT UPPER LIMB, INCLUDING SHOULDER: Status: ACTIVE | Noted: 2024-09-10

## 2024-09-10 PROBLEM — D22.72 MELANOCYTIC NEVI OF LEFT LOWER LIMB, INCLUDING HIP: Status: ACTIVE | Noted: 2024-09-10

## 2024-09-10 PROBLEM — D22.71 MELANOCYTIC NEVI OF RIGHT LOWER LIMB, INCLUDING HIP: Status: ACTIVE | Noted: 2024-09-10

## 2024-09-10 PROBLEM — D18.01 HEMANGIOMA OF SKIN AND SUBCUTANEOUS TISSUE: Status: ACTIVE | Noted: 2024-09-10

## 2024-09-10 PROBLEM — D22.62 MELANOCYTIC NEVI OF LEFT UPPER LIMB, INCLUDING SHOULDER: Status: ACTIVE | Noted: 2024-09-10

## 2024-09-10 PROBLEM — D48.5 NEOPLASM OF UNCERTAIN BEHAVIOR OF SKIN: Status: ACTIVE | Noted: 2024-09-10

## 2024-09-10 PROBLEM — D22.39 MELANOCYTIC NEVI OF OTHER PARTS OF FACE: Status: ACTIVE | Noted: 2024-09-10

## 2024-09-10 PROBLEM — D22.5 MELANOCYTIC NEVI OF TRUNK: Status: ACTIVE | Noted: 2024-09-10

## 2024-09-10 PROCEDURE — ? TREATMENT REGIMEN

## 2024-09-10 PROCEDURE — ? FULL BODY SKIN EXAM

## 2024-09-10 PROCEDURE — 11102 TANGNTL BX SKIN SINGLE LES: CPT

## 2024-09-10 PROCEDURE — 99203 OFFICE O/P NEW LOW 30 MIN: CPT | Mod: 25

## 2024-09-10 PROCEDURE — ? BIOPSY BY SHAVE METHOD

## 2024-09-10 PROCEDURE — ? COUNSELING

## 2024-09-10 ASSESSMENT — LOCATION DETAILED DESCRIPTION DERM
LOCATION DETAILED: RIGHT INFERIOR CENTRAL MALAR CHEEK
LOCATION DETAILED: UPPER STERNUM
LOCATION DETAILED: LEFT SUPERIOR LATERAL LOWER BACK
LOCATION DETAILED: RIGHT ANTERIOR PROXIMAL UPPER ARM
LOCATION DETAILED: LEFT DISTAL POSTERIOR THIGH
LOCATION DETAILED: RIGHT VENTRAL PROXIMAL FOREARM
LOCATION DETAILED: RIGHT LATERAL SUPERIOR CHEST
LOCATION DETAILED: EPIGASTRIC SKIN
LOCATION DETAILED: LEFT ANTERIOR PROXIMAL UPPER ARM
LOCATION DETAILED: RIGHT ANTERIOR PROXIMAL THIGH
LOCATION DETAILED: LEFT DISTAL POSTERIOR UPPER ARM
LOCATION DETAILED: LEFT INFERIOR MEDIAL FOREHEAD
LOCATION DETAILED: LEFT CENTRAL MALAR CHEEK
LOCATION DETAILED: RIGHT RIB CAGE
LOCATION DETAILED: LEFT SUPERIOR MEDIAL LOWER BACK
LOCATION DETAILED: RIGHT INFERIOR MEDIAL MIDBACK
LOCATION DETAILED: LEFT ANTERIOR PROXIMAL THIGH
LOCATION DETAILED: RIGHT DISTAL POSTERIOR THIGH
LOCATION DETAILED: RIGHT DISTAL POSTERIOR UPPER ARM
LOCATION DETAILED: RIGHT PROXIMAL POSTERIOR UPPER ARM
LOCATION DETAILED: LEFT PROXIMAL POSTERIOR UPPER ARM
LOCATION DETAILED: LEFT INFERIOR CENTRAL MALAR CHEEK

## 2024-09-10 ASSESSMENT — LOCATION SIMPLE DESCRIPTION DERM
LOCATION SIMPLE: LEFT LOWER BACK
LOCATION SIMPLE: RIGHT THIGH
LOCATION SIMPLE: RIGHT CHEEK
LOCATION SIMPLE: LEFT FOREHEAD
LOCATION SIMPLE: LEFT POSTERIOR THIGH
LOCATION SIMPLE: RIGHT POSTERIOR THIGH
LOCATION SIMPLE: RIGHT FOREARM
LOCATION SIMPLE: LEFT UPPER ARM
LOCATION SIMPLE: LEFT CHEEK
LOCATION SIMPLE: RIGHT LOWER BACK
LOCATION SIMPLE: RIGHT POSTERIOR UPPER ARM
LOCATION SIMPLE: LEFT POSTERIOR UPPER ARM
LOCATION SIMPLE: CHEST
LOCATION SIMPLE: RIGHT UPPER ARM
LOCATION SIMPLE: ABDOMEN
LOCATION SIMPLE: LEFT THIGH

## 2024-09-10 ASSESSMENT — LOCATION ZONE DERM
LOCATION ZONE: ARM
LOCATION ZONE: FACE
LOCATION ZONE: LEG
LOCATION ZONE: TRUNK

## 2024-09-10 NOTE — PROCEDURE: BIOPSY BY SHAVE METHOD

## 2024-10-08 ENCOUNTER — APPOINTMENT (RX ONLY)
Dept: URBAN - METROPOLITAN AREA CLINIC 26 | Facility: CLINIC | Age: 64
Setting detail: DERMATOLOGY
End: 2024-10-08

## 2024-10-08 PROBLEM — C44.519 BASAL CELL CARCINOMA OF SKIN OF OTHER PART OF TRUNK: Status: ACTIVE | Noted: 2024-10-08

## 2024-10-08 PROCEDURE — ? COUNSELING

## 2024-10-08 PROCEDURE — ? CURETTAGE AND DESTRUCTION

## 2024-10-08 PROCEDURE — 17263 DSTRJ MAL LES T/A/L 2.1-3.0: CPT

## 2024-10-08 NOTE — PROCEDURE: CURETTAGE AND DESTRUCTION
Detail Level: Detailed
Biopsy Photograph Reviewed: Yes
Number Of Curettages: 3
Size Of Lesion In Cm: 1.8
Size Of Lesion After Curettage: 2.4
Add Intralesional Injection: No
Concentration (Mg/Ml Or Millions Of Plaque Forming Units/Cc): 0.01
Total Volume (Ccs): 1
Anesthesia Type: 1% lidocaine with epinephrine
Cautery Type: electrodesiccation
What Was Performed First?: Curettage
Final Size Statement: The size of the lesion after curettage was
Additional Information: (Optional): The wound was cleaned, and a pressure dressing was applied.  The patient received detailed post-op instructions.
Consent was obtained from the patient. The risks, benefits and alternatives to therapy were discussed in detail. Specifically, the risks of infection, scarring, bleeding, prolonged wound healing, nerve injury, incomplete removal, allergy to anesthesia and recurrence were addressed. Alternatives to ED&C, such as: surgical removal and XRT were also discussed.  Prior to the procedure, the treatment site was clearly identified and confirmed by the patient. All components of Universal Protocol/PAUSE Rule completed.
Post-Care Instructions: I reviewed with the patient in detail post-care instructions. Patient is to keep the area dry for 48 hours, and not to engage in any swimming until the area is healed. Should the patient develop any fevers, chills, bleeding, severe pain patient will contact the office immediately.
Bill As A Line Item Or As Units: Line Item

## 2024-10-16 ENCOUNTER — TELEPHONE (OUTPATIENT)
Dept: SCHEDULING | Facility: CLINIC | Age: 64
End: 2024-10-16
Payer: COMMERCIAL

## 2024-10-16 NOTE — TELEPHONE ENCOUNTER
Pt called to r/s appt. Pt will  be out of town. Pt refused next available appt in FEB. Pt would like to be in NOV. Please advise pt @ [phone]

## 2024-10-16 NOTE — TELEPHONE ENCOUNTER
ROSA ISELA for pt to resched to the [facility] office on 1/7 or 1/14 since he did not want to wait until February

## 2024-10-16 NOTE — TELEPHONE ENCOUNTER
Lm for Chidi that Dr Garcia does not have a Nov appointment available, his next one is in February and we could put him on the wait list. Asked him to call and reschedule next available. Thanks

## 2024-10-16 NOTE — TELEPHONE ENCOUNTER
Spoke with Chidi and scheduled him for 2/26/25 at 3:20 pm. Please call if any cancellations sooner. Thanks

## 2024-12-12 DIAGNOSIS — N52.01 ERECTILE DYSFUNCTION DUE TO ARTERIAL INSUFFICIENCY: ICD-10-CM

## 2024-12-12 RX ORDER — SILDENAFIL 25 MG/1
TABLET, FILM COATED ORAL
Qty: 8 TABLET | Refills: 14 | Status: SHIPPED | OUTPATIENT
Start: 2024-12-12

## 2025-01-02 NOTE — PATIENT INSTRUCTIONS
Patient Education   Mediterranean Diet  A Mediterranean diet refers to food and lifestyle choices that are based on the traditions of countries located on the [place]. It focuses on eating more fruits, vegetables, whole grains, beans, nuts, seeds, and heart-healthy fats, and eating less dairy, meat, eggs, and processed foods with added sugar, salt, and fat. This way of eating has been shown to help prevent certain conditions and improve outcomes for people who have chronic diseases, like kidney disease and heart disease.  What are tips for following this plan?  Reading food labels  Check the serving size of packaged foods. For foods such as rice and pasta, the serving size refers to the amount of cooked product, not dry.  Check the total fat in packaged foods. Avoid foods that have saturated fat or trans fats.  Check the ingredient list for added sugars, such as corn syrup.  Shopping    Buy a variety of foods that offer a balanced diet, including:  Fresh fruits and vegetables (produce).  Grains, beans, nuts, and seeds. Some of these may be available in unpackaged forms or large amounts (in bulk).  Fresh seafood.  Poultry and eggs.  Low-fat dairy products.  Buy whole ingredients instead of prepackaged foods.  Buy fresh fruits and vegetables in-season from local [employer] markets.  Buy plain frozen fruits and vegetables.  If you do not have access to quality fresh seafood, buy precooked frozen shrimp or canned fish, such as tuna, salmon, or sardines.  Stock your pantry so you always have certain foods on hand, such as olive oil, canned tuna, canned tomatoes, rice, pasta, and beans.  Cooking  Cook foods with extra-virgin olive oil instead of using butter or other vegetable oils.  Have meat as a side dish, and have vegetables or grains as your main dish. This means having meat in small portions or adding small amounts of meat to foods like pasta or stew.  Use beans or vegetables instead of meat in common dishes  like chili or lasagna.  Benld with different cooking methods. Try roasting, broiling, steaming, and sautéing vegetables.  Add frozen vegetables to soups, stews, pasta, or rice.  Add nuts or seeds for added healthy fats and plant protein at each meal. You can add these to yogurt, salads, or vegetable dishes.  Marinate fish or vegetables using olive oil, lemon juice, garlic, and fresh herbs.  Meal planning  Plan to eat one vegetarian meal one day each week. Try to work up to two vegetarian meals, if possible.  Eat seafood two or more times a week.  Have healthy snacks readily available, such as:  Vegetable sticks with hummus.  Greek yogurt.  Fruit and nut trail mix.  Eat balanced meals throughout the week. This includes:  Fruit: 2-3 servings a day.  Vegetables: 4-5 servings a day.  Low-fat dairy: 2 servings a day.  Fish, poultry, or lean meat: 1 serving a day.  Beans and legumes: 2 or more servings a week.  Nuts and seeds: 1-2 servings a day.  Whole grains: 6-8 servings a day.  Extra-virgin olive oil: 3-4 servings a day.  Limit red meat and sweets to only a few servings a month.  Lifestyle    Cook and eat meals together with your family, when possible.  Drink enough fluid to keep your urine pale yellow.  Be physically active every day. This includes:  Aerobic exercise like running or swimming.  Leisure activities like gardening, walking, or housework.  Get 7-8 hours of sleep each night.  If recommended by your health care provider, drink red wine in moderation. This means 1 glass a day for nonpregnant women and 2 glasses a day for men. A glass of wine equals 5 oz (150 mL).  What foods should I eat?  Fruits  Apples. Apricots. Avocado. Berries. Bananas. Cherries. Dates. Figs. Grapes. Rosey. Melon. Oranges. Peaches. Plums. Pomegranate.  Vegetables  Artichokes. Beets. Broccoli. Cabbage. Carrots. Eggplant. Green beans. Chard. Kale. Spinach. Onions. Leeks. Peas. Squash. Tomatoes. Peppers.  Radishes.  Grains  Whole-grain pasta. Brown rice. Bulgur wheat. Polenta. Couscous. Whole-wheat bread. Oatmeal. Quinoa.  Meats and other proteins  Beans. Almonds. Sunflower seeds. Pine nuts. Peanuts. Cod. [place]. Scallops. Shrimp. Tuna. Tilapia. Clams. Oysters. Eggs. Poultry without skin.  Dairy  Low-fat milk. Cheese. Greek yogurt.  Fats and oils  Extra-virgin olive oil. Avocado oil. Grapeseed oil.  Beverages  Water. Red wine. Herbal tea.  Sweets and desserts  Greek yogurt with honey. Baked apples. Poached pears. Trail mix.  Seasonings and condiments  Basil. Cilantro. Coriander. Cumin. Mint. Parsley. Celso. Rosemary. Tarragon. Garlic. Oregano. Thyme. Pepper. Balsamic vinegar. Tahini. Hummus. Tomato sauce. Olives. Mushrooms.  The items listed above may not be a complete list of foods and beverages you can eat. Contact a dietitian for more information.  What foods should I limit?  This is a list of foods that should be eaten rarely or only on special occasions.  Fruits  Fruit canned in syrup.  Vegetables  Deep-fried potatoes (french fries).  Grains  Prepackaged pasta or rice dishes. Prepackaged cereal with added sugar. Prepackaged snacks with added sugar.  Meats and other proteins  Beef. Pork. Lamb. Poultry with skin. Hot dogs. [place].  Dairy  Ice cream. Sour cream. Whole milk.  Fats and oils  Butter. Canola oil. Vegetable oil. Beef fat (tallow). Lard.  Beverages  Juice. Sugar-sweetened soft drinks. Beer. Liquor and spirits.  Sweets and desserts  Cookies. Cakes. Pies. Candy.  Seasonings and condiments  Mayonnaise. Pre-made sauces and marinades.  The items listed above may not be a complete list of foods and beverages you should limit. Contact a dietitian for more information.  Summary  The Mediterranean diet includes both food and lifestyle choices.  Eat a variety of fresh fruits and vegetables, beans, nuts, seeds, and whole grains.  Limit the amount of red meat and sweets that you eat.  If recommended by your health  care provider, drink red wine in moderation. This means 1 glass a day for nonpregnant women and 2 glasses a day for men. A glass of wine equals 5 oz (150 mL).  This information is not intended to replace advice given to you by your health care provider. Make sure you discuss any questions you have with your health care provider.  Document Revised: 01/22/2021 Document Reviewed: 11/19/2020  Elsevier Patient Education © 2023 [employer]

## 2025-01-02 NOTE — PROGRESS NOTES
Cardiology Note       Reason for visit: Coronary artery disease.      Chidi returns today for follow-up for his coronary artery disease and hyperlipidemia.  Clinically, he has been doing well without any chest discomfort or shortness of breath.  He exercises regularly without any difficulty and has not had any symptoms similar to his index angina.    There has not been any recent hospitalizations or emergency room..    He stopped taking his rosuvastatin and felt that he would try natural/homeopathic vitamins for his cholesterol.  I explained to him that I do not feel that this will get him to goal nor is there any data to support that this will reduce his risk of future risk of MI, stroke or death.    On exam in the office his blood pressure was 128/82.    The EKG revealed normal sinus rhythm, nonspecific T wave abnormality.        Past Medical History:   Diagnosis Date    Mixed hyperlipidemia 6/13/2023    Palpitations 06/12/2023    SOB (shortness of breath)      Past Surgical History   Procedure Laterality Date    Appendectomy      2019    Coronary artery bypass graft  07/18/2023    robotic    Left heart cath with coronary angiography N/A 7/6/2023    [place] [facility] [place]    OP CAB MID/THORACOTOMY ELENA, ROBOTIC Left 7/18/2023    [place]van [place]aba [place] [facility] [place]     Social History     Tobacco Use    Smoking status: Never    Smokeless tobacco: Never   Vaping Use    Vaping status: Never Used   Substance Use Topics    Alcohol use: Yes     Comment: rare now    Drug use: Never      Family History   Problem Relation Name Age of Onset    Multiple sclerosis Biological Mother      Hyperlipidemia Biological Father      Hypertension Biological Father      Diabetes Biological Father      Heart disease Biological Father      Diabetes Biological Sister      No Known Problems Biological Sister      No Known Problems Biological Sister      No Known Problems Biological Sister       No Known Problems Biological Brother      Colon cancer Neg Hx      Prostate cancer Neg Hx       Augmentin [amoxicillin-pot clavulanate]  Current Outpatient Medications   Medication Instructions    acetylcysteine (NAC ORAL) 1,000 mg, Daily    ascorbic acid/multivit-min (EMERGEN-C ORAL) 1 packet, Daily    aspirin 81 mg, oral, Daily    berberine capsule capsule 1 capsule, Daily    bergamot extract 500 mg capsule 1 capsule, Daily    cholecalciferol (vitamin D3) 1,000 Units, Daily    mv-min/vit C/glut/lysine/hb124 (IMMUNE SUPPORT ORAL) 2 tablets, Daily    sildenafiL (VIAGRA) 25 mg tablet TAKE 1 TABLET BY MOUTH DAILY AS NEEDED FOR ERECTILE DYSFUNCTION.    TURMERIC ROOT-GINGER ROOT EXT ORAL 1 tablet, Daily    vitamin B complex (B COMPLEX ORAL) 1 tablet, Daily          Review of Systems   Cardiovascular:  Negative for chest pain, dyspnea on exertion, irregular heartbeat, leg swelling, near-syncope, orthopnea, palpitations, paroxysmal nocturnal dyspnea and syncope.      Objective    Vitals:    01/07/25 1100   BP: 128/82   Pulse: 78   SpO2: 97%      Wt Readings from Last 3 Encounters:   01/07/25 87.5 kg (193 lb)   04/30/24 85.5 kg (188 lb 8 oz)   04/24/24 85.3 kg (188 lb)      Physical Exam  Neck:      Vascular: No carotid bruit or JVD.   Cardiovascular:      Rate and Rhythm: Normal rate and regular rhythm.      Pulses:           Carotid pulses are 2+ on the right side and 2+ on the left side.       Dorsalis pedis pulses are 2+ on the right side and 2+ on the left side.        Posterior tibial pulses are 2+ on the right side and 2+ on the left side.      Heart sounds: S1 normal and S2 normal. No murmur heard.     No friction rub. No gallop.   Pulmonary:      Effort: Pulmonary effort is normal.      Breath sounds: Normal breath sounds.   Musculoskeletal:      Right lower leg: No edema.      Left lower leg: No edema.   Skin:     General: Skin is warm and dry.   Neurological:      Mental Status: He is alert.   Psychiatric:          Behavior: Behavior normal.                     Transthoracic echocardiogram.  8/15/2023    Left Ventricle: Normal ventricle size. Normal wall thickness. Preserved systolic function. Estimated EF 55-60%. Abnormal septal motion consistent with post-operative status. Grade I diastolic dysfunction.    Right Ventricle: Normal ventricle size. Normal systolic function.    Left Atrium: Normal sized atrium.    Right Atrium: Normal sized atrium.    Mitral Valve: Normal leaflet structure. Normal leaflet motion. No regurgitation. No stenosis.    Aortic Valve: Tricuspid valve. No regurgitation. No stenosis.    Tricuspid Valve: Normal structure. Jet is insufficient to calculate pulmonary artery pressure. Trace regurgitation.    Aorta: Aortic root normal. Sinuses of Valsalva normal-sized. Ascending aorta normal-sized.    Compared to the echocardiogram dated 7/6/2023: Abnormal septal motion is now present.        Robotic CABG.  7/18/2023  Operative Procedures Performed  OP CAB MID/THORACOTOMY CAPO ELENA Dr.        Carotid Dopplers.  7/6/2023  IMPRESSION:  Minimal plaque within the internal carotid arteries, causing less than 50%  stenosis.        Transthoracic echocardiogram.  7/6/2023  The left ventricular cavity size is normal with normal wall thickness and preserved systolic function. EF 55% by visual estimate. Difficult to assess regional wall motions despite use of Definity-Grossly normal wall motion. Normal diastolic function for age.  Aortic valve cusps poorly visualized. The visualized portions of the aortic root and ascending aorta are normal in size.  No aortic coarctation.  No aortic insufficiency or stenosis.  The mitral valve is normal. Trace mitral regurgitation.   The left atrium is normal in size.   Limited visualization but the right ventricle appears normal size with preserved systolic function.   The pulmonic valve is not well seen.   The tricuspid is normal in appearance. Trace tricuspid  regurgitation with insufficient jet to estimate RVSP.    Right atrium is not well seen.  The IVC is small and collapses > 50% with inspiration consistent with normal right atrial pressures.  No pericardial effusion.   No prior study for comparison.           Left heart cath and coronary angiography.  7/6/2023  Coronary Angiogram/Left Heart Catheterization   100% ostial LAD chronic total occlusion with left to left and right to left collaterals  Mild nonobstructive disease in the remaining coronary tree  LVEDP 13 mmHg        Exercise nuclear stress test.  6/19/2023.  This is an abnormal exercise nuclear stress test.  EKG with 2-2.5 mm upsloping ST depression in the inferolateral leads starting at 6:16 of exercise.  Lateral lead ST depression appears slightly more horizontal.  This persisted until approximately 2 minutes of recovery.  No exercise-induced arrhythmia.  Good exercise capacity.  Perfusion images are technically difficult secondary to artifact.  There is a moderate-sized area of moderately to severely reduced isotope uptake in the anteroseptum with mild reversibility at rest.  There is a small area of moderately reduced isotope uptake in the apical septum with mild improvement at rest.  The inferior wall has mildly reduced uptake on resting images only.  LVEF 55%.  Grossly normal wall motion.  Overall findings are suggestive of ischemia in the territory of the left anterior descending coronary artery.    ECG.  Normal sinus rhythm, nonspecific T wave abnormality.  No change compared to previous EKG     Assessment/Plan    Coronary artery disease involving native coronary artery of native heart  It has now been a year and a half since Chidi had a robotic LIMA to his LAD.  He has been doing well without any anginal symptoms.    He will continue on the aspirin.  I counseled on the continued need for diet and exercise.    I will have him get a repeat lipid profile, I told him if his LDL was not below 70 that  he would need to go back on statin therapy and he is willing to do this.    Mixed hyperlipidemia  Chidi will get a repeat lipid profile.  If his LDL is not below 70, I will place him back on the rosuvastatin.    Counseled on diet, exercise and weight loss.  He has gained 5 pounds since his last visit.    Chidi will be coming due for his day-to-day care.  I will call him with the results of his blood work after he has a performed.  He will call with any questions or concerns in the interim.            Thank you for allowing me to participate in the care of this patient.  If you have any questions please don't hesitate to contact me.    I spent 30 minutes on this date of service performing the following activities: obtaining history, performing examination, entering orders, documenting, preparing for visit, obtaining / reviewing records, providing counseling and education and communicating results.     Chidi Pham MD [facility]   1/7/2025  1:15 PM

## 2025-01-07 ENCOUNTER — OFFICE VISIT (OUTPATIENT)
Dept: CARDIOLOGY | Facility: CLINIC | Age: 65
End: 2025-01-07
Payer: COMMERCIAL

## 2025-01-07 VITALS
SYSTOLIC BLOOD PRESSURE: 128 MMHG | HEART RATE: 78 BPM | BODY MASS INDEX: 27.63 KG/M2 | HEIGHT: 70 IN | WEIGHT: 193 LBS | OXYGEN SATURATION: 97 % | DIASTOLIC BLOOD PRESSURE: 82 MMHG

## 2025-01-07 DIAGNOSIS — I25.10 CORONARY ARTERY DISEASE INVOLVING NATIVE CORONARY ARTERY OF NATIVE HEART WITHOUT ANGINA PECTORIS: Primary | ICD-10-CM

## 2025-01-07 DIAGNOSIS — E78.2 MIXED HYPERLIPIDEMIA: ICD-10-CM

## 2025-01-07 LAB
ATRIAL RATE: 65
P AXIS: 61
PR INTERVAL: 150
QRS DURATION: 88
QT INTERVAL: 374
QTC CALCULATION(BAZETT): 388
R AXIS: -9
T WAVE AXIS: 110
VENTRICULAR RATE: 65

## 2025-01-07 PROCEDURE — 99214 OFFICE O/P EST MOD 30 MIN: CPT | Performed by: INTERNAL MEDICINE

## 2025-01-07 PROCEDURE — 93000 ELECTROCARDIOGRAM COMPLETE: CPT | Performed by: INTERNAL MEDICINE

## 2025-01-07 PROCEDURE — 3008F BODY MASS INDEX DOCD: CPT | Performed by: INTERNAL MEDICINE

## 2025-01-07 ASSESSMENT — ENCOUNTER SYMPTOMS
NEAR-SYNCOPE: 0
DYSPNEA ON EXERTION: 0
SYNCOPE: 0
ORTHOPNEA: 0
IRREGULAR HEARTBEAT: 0
PALPITATIONS: 0
PND: 0

## 2025-01-07 NOTE — ASSESSMENT & PLAN NOTE
It has now been a year and a half since Chidi had a robotic LIMA to his LAD.  He has been doing well without any anginal symptoms.    He will continue on the aspirin.  I counseled on the continued need for diet and exercise.    I will have him get a repeat lipid profile, I told him if his LDL was not below 70 that he would need to go back on statin therapy and he is willing to do this.

## 2025-01-07 NOTE — LETTER
January 7, 2025     Meldoy Murphy MD  [address]  [place] [address]    Patient: Chidi Ramsey  YOB: 1960  Date of Visit: 1/7/2025      Dear Dr. Murphy:    Thank you for referring Chidi Ramsey to me for evaluation. Below are my notes for this consultation.    If you have questions, please do not hesitate to call me. I look forward to following your patient along with you.         Sincerely,        Chidi Pham MD        CC: No Recipients    Chidi Pham MD  1/7/2025  1:15 PM  Sign when Signing Visit     Cardiology Note       Reason for visit: Coronary artery disease.      Chidi returns today for follow-up for his coronary artery disease and hyperlipidemia.  Clinically, he has been doing well without any chest discomfort or shortness of breath.  He exercises regularly without any difficulty and has not had any symptoms similar to his index angina.    There has not been any recent hospitalizations or emergency room..    He stopped taking his rosuvastatin and felt that he would try natural/homeopathic vitamins for his cholesterol.  I explained to him that I do not feel that this will get him to goal nor is there any data to support that this will reduce his risk of future risk of MI, stroke or death.    On exam in the office his blood pressure was 128/82.    The EKG revealed normal sinus rhythm, nonspecific T wave abnormality.        Past Medical History:   Diagnosis Date   • Mixed hyperlipidemia 6/13/2023   • Palpitations 06/12/2023   • SOB (shortness of breath)      Past Surgical History   Procedure Laterality Date   • Appendectomy      2019   • Coronary artery bypass graft  07/18/2023    robotic   • Left heart cath with coronary angiography N/A 7/6/2023    [place] [facility] [place]   • OP CAB MID/THORACOTOMY ELENA, ROBOTIC Left 7/18/2023    [place] [facility] [place]     Social History      Tobacco Use   • Smoking status: Never   • Smokeless tobacco: Never   Vaping Use   • Vaping status: Never Used   Substance Use Topics   • Alcohol use: Yes     Comment: rare now   • Drug use: Never      Family History   Problem Relation Name Age of Onset   • Multiple sclerosis Biological Mother     • Hyperlipidemia Biological Father     • Hypertension Biological Father     • Diabetes Biological Father     • Heart disease Biological Father     • Diabetes Biological Sister     • No Known Problems Biological Sister     • No Known Problems Biological Sister     • No Known Problems Biological Sister     • No Known Problems Biological Brother     • Colon cancer Neg Hx     • Prostate cancer Neg Hx       Augmentin [amoxicillin-pot clavulanate]  Current Outpatient Medications   Medication Instructions   • acetylcysteine (NAC ORAL) 1,000 mg, Daily   • ascorbic acid/multivit-min (EMERGEN-C ORAL) 1 packet, Daily   • aspirin 81 mg, oral, Daily   • berberine capsule capsule 1 capsule, Daily   • bergamot extract 500 mg capsule 1 capsule, Daily   • cholecalciferol (vitamin D3) 1,000 Units, Daily   • mv-min/vit C/glut/lysine/hb124 (IMMUNE SUPPORT ORAL) 2 tablets, Daily   • sildenafiL (VIAGRA) 25 mg tablet TAKE 1 TABLET BY MOUTH DAILY AS NEEDED FOR ERECTILE DYSFUNCTION.   • TURMERIC ROOT-GINGER ROOT EXT ORAL 1 tablet, Daily   • vitamin B complex (B COMPLEX ORAL) 1 tablet, Daily          Review of Systems   Cardiovascular:  Negative for chest pain, dyspnea on exertion, irregular heartbeat, leg swelling, near-syncope, orthopnea, palpitations, paroxysmal nocturnal dyspnea and syncope.      Objective    Vitals:    01/07/25 1100   BP: 128/82   Pulse: 78   SpO2: 97%      Wt Readings from Last 3 Encounters:   01/07/25 87.5 kg (193 lb)   04/30/24 85.5 kg (188 lb 8 oz)   04/24/24 85.3 kg (188 lb)      Physical Exam  Neck:      Vascular: No carotid bruit or JVD.   Cardiovascular:      Rate and Rhythm: Normal rate and regular rhythm.       Pulses:           Carotid pulses are 2+ on the right side and 2+ on the left side.       Dorsalis pedis pulses are 2+ on the right side and 2+ on the left side.        Posterior tibial pulses are 2+ on the right side and 2+ on the left side.      Heart sounds: S1 normal and S2 normal. No murmur heard.     No friction rub. No gallop.   Pulmonary:      Effort: Pulmonary effort is normal.      Breath sounds: Normal breath sounds.   Musculoskeletal:      Right lower leg: No edema.      Left lower leg: No edema.   Skin:     General: Skin is warm and dry.   Neurological:      Mental Status: He is alert.   Psychiatric:         Behavior: Behavior normal.                     Transthoracic echocardiogram.  8/15/2023  •  Left Ventricle: Normal ventricle size. Normal wall thickness. Preserved systolic function. Estimated EF 55-60%. Abnormal septal motion consistent with post-operative status. Grade I diastolic dysfunction.  •  Right Ventricle: Normal ventricle size. Normal systolic function.  •  Left Atrium: Normal sized atrium.  •  Right Atrium: Normal sized atrium.  •  Mitral Valve: Normal leaflet structure. Normal leaflet motion. No regurgitation. No stenosis.  •  Aortic Valve: Tricuspid valve. No regurgitation. No stenosis.  •  Tricuspid Valve: Normal structure. Jet is insufficient to calculate pulmonary artery pressure. Trace regurgitation.  •  Aorta: Aortic root normal. Sinuses of Valsalva normal-sized. Ascending aorta normal-sized.  •  Compared to the echocardiogram dated 7/6/2023: Abnormal septal motion is now present.        Robotic CABG.  7/18/2023  Operative Procedures Performed  OP CAB MID/THORACOTOMY CAPO ELENA Dr.        Carotid Dopplers.  7/6/2023  IMPRESSION:  Minimal plaque within the internal carotid arteries, causing less than 50%  stenosis.        Transthoracic echocardiogram.  7/6/2023  The left ventricular cavity size is normal with normal wall thickness and preserved systolic function. EF  55% by visual estimate. Difficult to assess regional wall motions despite use of Definity-Grossly normal wall motion. Normal diastolic function for age.  Aortic valve cusps poorly visualized. The visualized portions of the aortic root and ascending aorta are normal in size.  No aortic coarctation.  No aortic insufficiency or stenosis.  The mitral valve is normal. Trace mitral regurgitation.   The left atrium is normal in size.   Limited visualization but the right ventricle appears normal size with preserved systolic function.   The pulmonic valve is not well seen.   The tricuspid is normal in appearance. Trace tricuspid regurgitation with insufficient jet to estimate RVSP.    Right atrium is not well seen.  The IVC is small and collapses > 50% with inspiration consistent with normal right atrial pressures.  No pericardial effusion.   No prior study for comparison.           Left heart cath and coronary angiography.  7/6/2023  Coronary Angiogram/Left Heart Catheterization   100% ostial LAD chronic total occlusion with left to left and right to left collaterals  Mild nonobstructive disease in the remaining coronary tree  LVEDP 13 mmHg        Exercise nuclear stress test.  6/19/2023.  This is an abnormal exercise nuclear stress test.  EKG with 2-2.5 mm upsloping ST depression in the inferolateral leads starting at 6:16 of exercise.  Lateral lead ST depression appears slightly more horizontal.  This persisted until approximately 2 minutes of recovery.  No exercise-induced arrhythmia.  Good exercise capacity.  Perfusion images are technically difficult secondary to artifact.  There is a moderate-sized area of moderately to severely reduced isotope uptake in the anteroseptum with mild reversibility at rest.  There is a small area of moderately reduced isotope uptake in the apical septum with mild improvement at rest.  The inferior wall has mildly reduced uptake on resting images only.  LVEF 55%.  Grossly normal wall  motion.  Overall findings are suggestive of ischemia in the territory of the left anterior descending coronary artery.    ECG.  Normal sinus rhythm, nonspecific T wave abnormality.  No change compared to previous EKG     Assessment/Plan    Coronary artery disease involving native coronary artery of native heart  It has now been a year and a half since Chidi had a robotic LIMA to his LAD.  He has been doing well without any anginal symptoms.    He will continue on the aspirin.  I counseled on the continued need for diet and exercise.    I will have him get a repeat lipid profile, I told him if his LDL was not below 70 that he would need to go back on statin therapy and he is willing to do this.    Mixed hyperlipidemia  Chidi will get a repeat lipid profile.  If his LDL is not below 70, I will place him back on the rosuvastatin.    Counseled on diet, exercise and weight loss.  He has gained 5 pounds since his last visit.    Chidi will be coming due for his day-to-day care.  I will call him with the results of his blood work after he has a performed.  He will call with any questions or concerns in the interim.            Thank you for allowing me to participate in the care of this patient.  If you have any questions please don't hesitate to contact me.    I spent 30 minutes on this date of service performing the following activities: obtaining history, performing examination, entering orders, documenting, preparing for visit, obtaining / reviewing records, providing counseling and education and communicating results.     Chidi Pham MD [facility]   1/7/2025  1:15 PM

## 2025-01-22 ENCOUNTER — OFFICE VISIT (OUTPATIENT)
Dept: FAMILY MEDICINE | Facility: CLINIC | Age: 65
End: 2025-01-22
Payer: COMMERCIAL

## 2025-01-22 VITALS
SYSTOLIC BLOOD PRESSURE: 126 MMHG | TEMPERATURE: 97.4 F | HEART RATE: 77 BPM | BODY MASS INDEX: 27.49 KG/M2 | OXYGEN SATURATION: 98 % | WEIGHT: 192 LBS | HEIGHT: 70 IN | DIASTOLIC BLOOD PRESSURE: 84 MMHG

## 2025-01-22 DIAGNOSIS — Z00.00 ROUTINE HEALTH MAINTENANCE: Primary | ICD-10-CM

## 2025-01-22 DIAGNOSIS — I25.10 CORONARY ARTERY DISEASE INVOLVING NATIVE CORONARY ARTERY OF NATIVE HEART WITHOUT ANGINA PECTORIS: ICD-10-CM

## 2025-01-22 DIAGNOSIS — Z23 NEED FOR TDAP VACCINATION: ICD-10-CM

## 2025-01-22 DIAGNOSIS — Z12.5 PROSTATE CANCER SCREENING: ICD-10-CM

## 2025-01-22 PROCEDURE — 90715 TDAP VACCINE 7 YRS/> IM: CPT | Performed by: FAMILY MEDICINE

## 2025-01-22 PROCEDURE — 3008F BODY MASS INDEX DOCD: CPT | Performed by: FAMILY MEDICINE

## 2025-01-22 PROCEDURE — 99396 PREV VISIT EST AGE 40-64: CPT | Mod: 25 | Performed by: FAMILY MEDICINE

## 2025-01-22 PROCEDURE — 90471 IMMUNIZATION ADMIN: CPT | Performed by: FAMILY MEDICINE

## 2025-01-22 ASSESSMENT — ENCOUNTER SYMPTOMS
ABDOMINAL PAIN: 0
NAUSEA: 0
ARTHRALGIAS: 0
BLOOD IN STOOL: 0
UNEXPECTED WEIGHT CHANGE: 0
DYSURIA: 0
PHOTOPHOBIA: 0
RHINORRHEA: 0
NERVOUS/ANXIOUS: 1
DIARRHEA: 0
PALPITATIONS: 1
DIZZINESS: 0
ADENOPATHY: 0
HEADACHES: 0
SHORTNESS OF BREATH: 0
DYSPHORIC MOOD: 0
TROUBLE SWALLOWING: 0
MYALGIAS: 0
CONSTIPATION: 0
FEVER: 0
VOMITING: 0
COUGH: 0
CHILLS: 0
BRUISES/BLEEDS EASILY: 0
COLOR CHANGE: 0
HEMATURIA: 0
LIGHT-HEADEDNESS: 0

## 2025-01-22 NOTE — ASSESSMENT & PLAN NOTE
Asx.  Check labs.  Strongly encouraged him to take statin if LDL > 70.  He is willing. Wife is a bit resistant, but he would restart if recommended.

## 2025-01-22 NOTE — ASSESSMENT & PLAN NOTE
History  Chief Complaint   Patient presents with   • Shortness of Breath     Pt came in by EMS  Son called 911 b/c pt could not breathe  Hx of COPD  Sats in the 70's on RA  78 yo female brought to ED by EMS for dyspnea which started tonight  They state RA sat was 70%, improved to 100% after 2 duonebs and IV solumedrol  PMH significant for COPD, not on home oxygen  No leg pain or swelling  No cough or fever  No hemoptysis  No chest pain  Current smoker  Prior to Admission Medications   Prescriptions Last Dose Informant Patient Reported? Taking? albuterol (PROVENTIL HFA,VENTOLIN HFA) 90 mcg/act inhaler   Yes Yes   Sig: Inhale 2 puffs every 6 (six) hours as needed for wheezing   amLODIPine (NORVASC) 5 mg tablet   Yes Yes   Sig: Take by mouth daily   aspirin (ECOTRIN LOW STRENGTH) 81 mg EC tablet   Yes Yes   Sig: Take 81 mg by mouth daily   atorvastatin (LIPITOR) 40 mg tablet   Yes Yes   Sig: Take 40 mg by mouth daily   furosemide (LASIX) 20 mg tablet   Yes Yes   Sig: Take 20 mg by mouth daily   lisinopril (ZESTRIL) 10 mg tablet   Yes Yes   Sig: Take 10 mg by mouth daily   metoprolol succinate (TOPROL-XL) 50 mg 24 hr tablet   Yes Yes   Sig: Take 50 mg by mouth daily      Facility-Administered Medications: None       Past Medical History:   Diagnosis Date   • COPD (chronic obstructive pulmonary disease) (HCC)    • Hyperlipemia    • Hypertension        No past surgical history on file  No family history on file  I have reviewed and agree with the history as documented  E-Cigarette/Vaping   • E-Cigarette Use Never User      E-Cigarette/Vaping Substances     Social History     Tobacco Use   • Smoking status: Current Every Day Smoker     Packs/day: 0 50   • Smokeless tobacco: Never Used   Vaping Use   • Vaping Use: Never used   Substance Use Topics   • Alcohol use: Yes   • Drug use: Never       Review of Systems   Respiratory: Positive for cough, chest tightness and shortness of breath      All other Routine labs ordered (lipids by Dr. Pham).  Tdap today.  Declined flu/covid vaccines.   He will consider shingrix.   Continue healthy exercise.  Needs to improve diet. He is honest in that he doesn't see himself changing much.  Dental recommendation provided.  Orders:    CBC and Differential; Future    Comprehensive metabolic panel; Future    Hemoglobin A1c; Future     systems reviewed and are negative  Physical Exam  Physical Exam  Vitals and nursing note reviewed  Constitutional:       General: She is not in acute distress  Appearance: She is well-developed  She is ill-appearing  She is not diaphoretic  HENT:      Head: Normocephalic and atraumatic  Eyes:      Conjunctiva/sclera: Conjunctivae normal       Pupils: Pupils are equal, round, and reactive to light  Neck:      Vascular: No JVD  Cardiovascular:      Rate and Rhythm: Normal rate and regular rhythm  Pulses: Normal pulses  Heart sounds: Normal heart sounds  Pulmonary:      Effort: Respiratory distress present  Breath sounds: No stridor  Wheezing present  Abdominal:      General: There is no distension  Palpations: Abdomen is soft  Tenderness: There is no abdominal tenderness  There is no guarding or rebound  Musculoskeletal:         General: No tenderness or deformity  Normal range of motion  Cervical back: Normal range of motion and neck supple  Skin:     General: Skin is warm and dry  Capillary Refill: Capillary refill takes less than 2 seconds  Coloration: Skin is not jaundiced or pale  Findings: No bruising, erythema, lesion or rash  Neurological:      General: No focal deficit present  Mental Status: She is alert and oriented to person, place, and time  Cranial Nerves: No cranial nerve deficit  Sensory: No sensory deficit  Motor: No weakness or abnormal muscle tone        Coordination: Coordination normal          Vital Signs  ED Triage Vitals   Temperature Pulse Respirations Blood Pressure SpO2   10/24/22 0145 10/24/22 0145 10/24/22 0145 10/24/22 0145 10/24/22 0145   98 1 °F (36 7 °C) 70 (!) 24 140/71 92 %      Temp Source Heart Rate Source Patient Position - Orthostatic VS BP Location FiO2 (%)   10/24/22 0145 10/24/22 0145 10/24/22 0145 10/24/22 0145 --   Oral Monitor Sitting Left arm       Pain Score       10/24/22 0639 6           Vitals:    10/24/22 0145 10/24/22 0558   BP: 140/71 165/75   Pulse: 70 73   Patient Position - Orthostatic VS: Sitting Sitting         Visual Acuity      ED Medications  Medications   albuterol (FOR EMS ONLY) (2 5 mg/3 mL) 0 083 % inhalation solution 5 mg (0 mg Does not apply Given to EMS 10/24/22 0145)   ipratropium-albuterol (FOR EMS ONLY) (DUO-NEB) 0 5-2 5 mg/3 mL inhalation solution 6 mL (0 mL Does not apply Given to EMS 10/24/22 0145)   methylPREDNISolone sodium succinate (FOR EMS ONLY) (Solu-MEDROL) 125 MG injection 125 mg (0 mg Does not apply Given to EMS 10/24/22 0145)   albuterol inhalation solution 5 mg (5 mg Nebulization Given 10/24/22 0209)   iohexol (OMNIPAQUE) 350 MG/ML injection (SINGLE-DOSE) 85 mL (85 mL Intravenous Given 10/24/22 0326)   ipratropium-albuterol (DUO-NEB) 0 5-2 5 mg/3 mL inhalation solution 3 mL (3 mL Nebulization Given 10/24/22 0552)   furosemide (LASIX) injection 40 mg (40 mg Intravenous Given 10/24/22 0604)   potassium chloride (K-DUR,KLOR-CON) CR tablet 40 mEq (40 mEq Oral Given 10/24/22 0604)   acetaminophen (TYLENOL) tablet 650 mg (650 mg Oral Given 10/24/22 0622)       Diagnostic Studies  Results Reviewed     Procedure Component Value Units Date/Time    HS Troponin I 2hr [[id number]]  (Abnormal) Collected: 10/24/22 0427    Lab Status: Final result Specimen: Blood from Arm, Right Updated: 10/24/22 0458     hs TnI 2hr 315 ng/L      Delta 2hr hsTnI 93 ng/L     HS Troponin I 4hr [[id number]]     Lab Status: No result Specimen: Blood     NT-BNP PRO [[id number]]  (Abnormal) Collected: 10/24/22 0157    Lab Status: Final result Specimen: Blood from Arm, Right Updated: 10/24/22 0342     NT-proBNP 782 pg/mL     FLU/RSV/COVID - if FLU/RSV clinically relevant [[id number]]  (Normal) Collected: 10/24/22 0157    Lab Status: Final result Specimen: Nares from Nose Updated: 10/24/22 0902     SARS-CoV-2 Negative     INFLUENZA A PCR Negative     INFLUENZA B PCR Negative     RSV PCR Negative    Narrative:      FOR PEDIATRIC PATIENTS - copy/paste COVID Guidelines URL to browser: [email or web address]    SARS-CoV-2 assay is a Nucleic Acid Amplification assay intended for the  qualitative detection of nucleic acid from SARS-CoV-2 in nasopharyngeal  swabs  Results are for the presumptive identification of SARS-CoV-2 RNA  Positive results are indicative of infection with SARS-CoV-2, the virus  causing COVID-19, but do not rule out bacterial infection or co-infection  with other viruses  Laboratories within the [place] and its  territories are required to report all positive results to the appropriate  public health authorities  Negative results do not preclude SARS-CoV-2  infection and should not be used as the sole basis for treatment or other  patient management decisions  Negative results must be combined with  clinical observations, patient history, and epidemiological information  This test has not been FDA cleared or approved  This test has been authorized by FDA under an Emergency Use Authorization  (EUA)  This test is only authorized for the duration of time the  declaration that circumstances exist justifying the authorization of the  emergency use of an in vitro diagnostic tests for detection of SARS-CoV-2  virus and/or diagnosis of COVID-19 infection under section 564(b)(1) of  the Act, 21 U  [place] C  039MOH-6(D)(4), unless the authorization is terminated  or revoked sooner  The test has been validated but independent review by FDA  and CLIA is pending  Test performed using [employer] GeneXpert: This RT-PCR assay targets N2,  a region unique to SARS-CoV-2  A conserved region in the E-gene was chosen  for pan-Sarbecovirus detection which includes SARS-CoV-2  According to CMS-2020-01-R, this platform meets the definition of high-[employer] technology      Basic metabolic panel [[id number]]  (Abnormal) Collected: 10/24/22 0155 Lab Status: Final result Specimen: Blood from Arm, Right Updated: 10/24/22 0259     Sodium 140 mmol/L      Potassium 3 4 mmol/L      Chloride 100 mmol/L      CO2 30 mmol/L      ANION GAP 10 mmol/L      BUN 7 mg/dL      Creatinine 0 71 mg/dL      Glucose 117 mg/dL      Calcium 8 9 mg/dL      eGFR 88 ml/min/1 73sq m     Narrative:      [place] guidelines for Chronic Kidney Disease (CKD):   •  Stage 1 with normal or high GFR (GFR > 90 mL/min/1 73 square meters)  •  Stage 2 Mild CKD (GFR = 60-89 mL/min/1 73 square meters)  •  Stage 3A Moderate CKD (GFR = 45-59 mL/min/1 73 square meters)  •  Stage 3B Moderate CKD (GFR = 30-44 mL/min/1 73 square meters)  •  Stage 4 Severe CKD (GFR = 15-29 mL/min/1 73 square meters)  •  Stage 5 End Stage CKD (GFR <15 mL/min/1 73 square meters)  Note: GFR calculation is accurate only with a steady state creatinine    Hepatic function panel [[id number]]  (Abnormal) Collected: 10/24/22 0157    Lab Status: Final result Specimen: Blood from Arm, Right Updated: 10/24/22 0259     Total Bilirubin 0 94 mg/dL      Bilirubin, Direct 0 29 mg/dL      Alkaline Phosphatase 115 U/L      AST 53 U/L      ALT 54 U/L      Total Protein 8 2 g/dL      Albumin 3 5 g/dL     HS Troponin 0hr (reflex protocol) [[id number]]  (Abnormal) Collected: 10/24/22 0157    Lab Status: Final result Specimen: Blood from Arm, Right Updated: 10/24/22 0230     hs TnI 0hr 222 ng/L     Blood gas, venous [[id number]]  (Abnormal) Collected: 10/24/22 0157    Lab Status: Final result Specimen: Blood from Arm, Right Updated: 10/24/22 0208     pH, Richard 7 319     pCO2, Richard 63 4 mm Hg      pO2, Richard 37 4 mm Hg      HCO3, Richard 31 9 mmol/L      Base Excess, Richard 3 4 mmol/L      O2 Content, Richard 15 1 ml/dL      O2 HGB, VENOUS 62 8 %     CBC and differential [[id number]]  (Abnormal) Collected: 10/24/22 0157    Lab Status: Final result Specimen: Blood from Arm, Right Updated: 10/24/22 0204     WBC 9 57 Thousand/uL      RBC 4 46 Million/uL      Hemoglobin 15 8 g/dL      Hematocrit 47 6 %       fL      MCH 35 4 pg      MCHC 33 2 g/dL      RDW 15 2 %      MPV 11 3 fL      Platelets 993 Thousands/uL      nRBC 0 /100 WBCs      Neutrophils Relative 71 %      Immat GRANS % 0 %      Lymphocytes Relative 19 %      Monocytes Relative 6 %      Eosinophils Relative 4 %      Basophils Relative 0 %      Neutrophils Absolute 6 79 Thousands/µL      Immature Grans Absolute 0 04 Thousand/uL      Lymphocytes Absolute 1 77 Thousands/µL      Monocytes Absolute 0 56 Thousand/µL      Eosinophils Absolute 0 37 Thousand/µL      Basophils Absolute 0 04 Thousands/µL                  CTA ED chest PE study   Final Result by Dipika Harrison DO (10/24 0422)      Some images are suboptimal secondary to respiratory motion which decreases sensitivity for evaluation of peripheral pulmonary emboli, subject to this, no pulmonary embolism is seen  Scattered parenchymal and paraseptal emphysematous changes  Thickening of the small airways noted which could represent acute versus chronic bronchitis  Correlation with the patient's symptoms and laboratory values recommended  No focal pneumonia is    seen  Incidental thyroid nodule(s) for which nonemergent thyroid ultrasound is recommended  Cardiomegaly, fatty infiltration of the liver, and other findings as above  Workstation performed: [id number]         XR chest 1 view portable    (Results Pending)              Procedures  ECG 12 Lead Documentation Only    Date/Time: 10/24/2022 5:52 AM  Performed by: Talia Be MD  Authorized by: Talia Be MD     Indications / Diagnosis:  Sob  ECG reviewed by me, the ED Provider: yes    Patient location:  ED  Interpretation:     Interpretation: non-specific    Rate:     ECG rate:  60    ECG rate assessment: normal    Rhythm:     Rhythm: sinus rhythm    Comments:      SR  RBBB  Nonspecific st changes  Unchanged as compared to prior ekg  CriticalCare Time  Performed by: Akanksha Jackson MD  Authorized by: Akanksha Jackson MD     Critical care provider statement:     Critical care time (minutes):  35    Critical care time was exclusive of:  Separately billable procedures and treating other patients    Critical care was necessary to treat or prevent imminent or life-threatening deterioration of the following conditions:  Respiratory failure             ED Course                               SBIRT 20yo+    Flowsheet Row Most Recent Value   SBIRT (25 yo +)    In order to provide better care to our patients, we are screening all of our patients for alcohol and drug use  Would it be okay to ask you these screening questions?  No Filed at: 10/24/2022 0244          Wells' Criteria for PE    Flowsheet Row Most Recent Value   Wells' Criteria for PE    Clinical signs and symptoms of DVT 0 Filed at: 10/24/2022 0300   PE is primary diagnosis or equally likely 3 Filed at: 10/24/2022 0300   HR >100 1 5 Filed at: 10/24/2022 0300   Immobilization at least 3 days or Surgery in the previous 4 weeks 0 Filed at: 10/24/2022 0300   Previous, objectively diagnosed PE or DVT 0 Filed at: 10/24/2022 0300   Hemoptysis 0 Filed at: 10/24/2022 0300   Malignancy with treatment within 6 months or palliative 0 Filed at: 10/24/2022 0300   Wells' Criteria Total 4 5 Filed at: 10/24/2022 0300                [facility]  Number of Diagnoses or Management Options     Amount and/or Complexity of Data Reviewed  Clinical lab tests: ordered and reviewed  Tests in the radiology section of CPT®: ordered and reviewed  Tests in the medicine section of CPT®: reviewed and ordered  Independent visualization of images, tracings, or specimens: yes        Disposition  Final diagnoses:   COPD exacerbation (HCC)   Hypoxia   Elevated troponin   Elevated brain natriuretic peptide (BNP) level     Time reflects when diagnosis was documented in both MDM as applicable and the Disposition within this note     Time User Action Codes Description Comment    10/24/2022  6:12 AM Ingrid Moralese Add [J44 1] COPD exacerbation ([address] )     10/24/2022  6:12 AM Ingrid Moralese Add [R09 02] Hypoxia     10/24/2022  6:12 AM Joel Lora Add [R77 8] Elevated troponin     10/24/2022  6:12 AM Vivian Houston Lambert Add [R79 89] Elevated brain natriuretic peptide (BNP) level       ED Disposition     ED Disposition   Admit    Condition   Stable    Date/Time   Mon Oct 24, 2022  6:12 AM    Comment   Case was [facility] with [facility] and the patient's admission status was agreed to be Admission Status: inpatient status to the service of Dr Boston Shah   Follow-up Information    None         Patient's Medications   Discharge Prescriptions    No medications on file       No discharge procedures on file      PDMP Review     None          ED Provider  Electronically Signed by           Talia Be MD  10/24/22 6029

## 2025-01-22 NOTE — PROGRESS NOTES
"Chief Complaint   Patient presents with    follow up        HPI: Chidi Ramsey is a 64 y.o. year old male who presents today for annual physical    CAD: Still taking aspirin.  Stopped his statin.  Taking bergamot, berberine, curcumin at night.   B complex, Vit D, C/E/K, curcumin, NAC, immune booster.      Cancer screening:  Colonoscopy: Up to date  Prostate: Due     Immunizations: Due for flu, COVID.  Dental exam: Due  Eye exam: Up to date     Exercise: Pel[employer].   Diet: \"not great.\" Eating a lot of sweets.   Breakfast chocolate donut and slim fast shake.  Lunch - tuna or PBJ.   V8 drink once a day.  Some veggies. Broccoli last night or salad 2-3 days per week.       Depression Screening:           Patient Active Problem List   Diagnosis    Mixed hyperlipidemia    Coronary artery disease involving native coronary artery of native heart    Post pericardiotomy syndrome    S/P CABG x 1    Erectile dysfunction due to arterial insufficiency    Psoriasis    Routine health maintenance        Past Medical History:   Diagnosis Date    Mixed hyperlipidemia 6/13/2023    Palpitations 06/12/2023    SOB (shortness of breath)         Past Surgical History   Procedure Laterality Date    Appendectomy      2019    Coronary artery bypass graft  07/18/2023    robotic    Left heart cath with coronary angiography N/A 7/6/2023    Performed by West Bai MD at [facility] CARDIAC CATH/EP    OP CAB MID/THORACOTOMY ELENA, ROBOTIC Left 7/18/2023    Performed by Yvan Kaba DO at [facility] OR        Family History   Problem Relation Name Age of Onset    Multiple sclerosis Biological Mother      Hyperlipidemia Biological Father      Hypertension Biological Father      Diabetes Biological Father      Heart disease Biological Father      Diabetes Biological Sister      No Known Problems Biological Sister      No Known Problems Biological Sister      No Known Problems Biological Sister      No Known Problems Biological Brother      Colon " cancer Neg Hx      Prostate cancer Neg Hx          Social History     Socioeconomic History    Marital status:      Spouse name: Not on file    Number of children: Not on file    Years of education: Not on file    Highest education level: Not on file   Occupational History    Not on file   Tobacco Use    Smoking status: Never    Smokeless tobacco: Never   Vaping Use    Vaping status: Never Used   Substance and Sexual Activity    Alcohol use: Yes     Comment: rare now    Drug use: Never    Sexual activity: Yes     Partners: Female   Other Topics Concern    Not on file   Social History Narrative    Not on file     Social Drivers of Health     Financial Resource Strain: Low Risk  (7/18/2023)    Overall Financial Resource Strain (CARDIA)     Difficulty of Paying Living Expenses: Not hard at all   Food Insecurity: Not on file   Transportation Needs: No Transportation Needs (7/18/2023)    PRAPARE - Transportation     Lack of Transportation (Medical): No     Lack of Transportation (Non-Medical): No   Physical Activity: Not on file   Stress: Not on file   Social Connections: Not on file   Intimate Partner Violence: Not on file   Housing Stability: Unknown (7/18/2023)    Housing Stability Vital Sign     Unable to Pay for Housing in the Last Year: No     Number of Places Lived in the Last Year: Not on file     Unstable Housing in the Last Year: No        Current Outpatient Medications on File Prior to Visit   Medication Sig Dispense Refill    acetylcysteine (NAC ORAL) Take 1,000 mg by mouth daily.      ascorbic acid/multivit-min (EMERGEN-C ORAL) Take 1 packet by mouth daily.      aspirin 81 mg chewable tablet Take 1 tablet (81 mg total) by mouth daily. 90 tablet 3    berberine capsule capsule Take 1 capsule by mouth daily.      bergamot extract 500 mg capsule Take 1 capsule by mouth daily.      cholecalciferol, vitamin D3, 1,000 unit (25 mcg) tablet Take 1,000 Units by mouth daily.      mv-min/vit C/glut/lysine/hb124  "(IMMUNE SUPPORT ORAL) Take 2 tablets by mouth daily.      sildenafiL (VIAGRA) 25 mg tablet TAKE 1 TABLET BY MOUTH DAILY AS NEEDED FOR ERECTILE DYSFUNCTION. 8 tablet 14    TURMERIC ROOT-GINGER ROOT EXT ORAL Take 1 tablet by mouth daily.      vitamin B complex (B COMPLEX ORAL) Take 1 tablet by mouth daily.       No current facility-administered medications on file prior to visit.        Allergies   Allergen Reactions    Augmentin [Amoxicillin-Pot Clavulanate] Other (see comments)     Stomach pain        Review of Systems   Constitutional:  Negative for chills, fever and unexpected weight change.   HENT:  Negative for congestion, hearing loss, rhinorrhea and trouble swallowing.    Eyes:  Negative for photophobia and visual disturbance.   Respiratory:  Negative for cough and shortness of breath.    Cardiovascular:  Positive for palpitations (one episode). Negative for chest pain.   Gastrointestinal:  Negative for abdominal pain, blood in stool, constipation, diarrhea, nausea and vomiting.   Endocrine: Negative for cold intolerance and heat intolerance.   Genitourinary:  Negative for dysuria and hematuria.   Musculoskeletal:  Negative for arthralgias and myalgias.   Skin:  Positive for rash. Negative for color change.   Allergic/Immunologic: Negative for environmental allergies and food allergies.   Neurological:  Negative for dizziness, syncope, light-headedness and headaches.   Hematological:  Negative for adenopathy. Does not bruise/bleed easily.   Psychiatric/Behavioral:  Negative for dysphoric mood. The patient is nervous/anxious.    All other systems reviewed and are negative.       Visit Vitals  /84 (BP Location: Left upper arm, Patient Position: Sitting)   Pulse 77   Temp 36.3 °C (97.4 °F) (Temporal)   Ht 1.778 m (5' 10\")   Wt 87.1 kg (192 lb)   SpO2 98%   BMI 27.55 kg/m²        Physical Exam  Vitals reviewed.   Constitutional:       General: He is not in acute distress.     Appearance: Normal appearance. " He is not ill-appearing.   HENT:      Head: Normocephalic and atraumatic.      Right Ear: Tympanic membrane and ear canal normal.      Left Ear: Tympanic membrane and ear canal normal.      Nose: Nose normal. No congestion or rhinorrhea.      Mouth/Throat:      Mouth: Mucous membranes are moist.      Pharynx: Oropharynx is clear. No oropharyngeal exudate.   Eyes:      General:         Right eye: No discharge.         Left eye: No discharge.      Conjunctiva/sclera: Conjunctivae normal.      Pupils: Pupils are equal, round, and reactive to light.   Cardiovascular:      Rate and Rhythm: Normal rate and regular rhythm.      Heart sounds: No murmur heard.  Pulmonary:      Effort: Pulmonary effort is normal.      Breath sounds: Normal breath sounds. No wheezing, rhonchi or rales.   Abdominal:      General: Bowel sounds are normal. There is no distension.      Palpations: Abdomen is soft.      Tenderness: There is no abdominal tenderness.   Musculoskeletal:         General: No swelling or deformity.      Cervical back: Normal range of motion and neck supple.   Lymphadenopathy:      Cervical: No cervical adenopathy.   Skin:     General: Skin is warm and dry.      Comments: Scar from Mohs - left low back.   Neurological:      General: No focal deficit present.      Mental Status: He is alert and oriented to person, place, and time.   Psychiatric:         Mood and Affect: Mood normal.         Behavior: Behavior normal.          Labs/Studies:     Latest Reference Range & Units 04/17/24 08:04   Sodium 134 - 144 mmol/L 141   Potassium, Bld 3.5 - 5.2 mmol/L 4.3   Chloride 96 - 106 mmol/L 103   CO2 20 - 29 mmol/L 23   BUN 8 - 27 mg/dL 13   Creatinine 0.76 - 1.27 mg/dL 1.00   Glucose 70 - 99 mg/dL 96   Calcium 8.6 - 10.2 mg/dL 9.2   AST (SGOT) 0 - 40 IU/L 30   ALT (SGPT) 0 - 44 IU/L 27   Alkaline Phosphatase 44 - 121 IU/L 98   Total Protein 6.0 - 8.5 g/dL 6.5   Albumin 3.9 - 4.9 g/dL 4.4   Bilirubin, Total 0.0 - 1.2 mg/dL 0.5    eGFR >59 mL/min/1.73 85   Bun/Creatinine Ratio 10 - 24  13   Globulin 1.5 - 4.5 g/dL 2.1   Cholesterol 100 - 199 mg/dL 131   Triglycerides 0 - 149 mg/dL 86   HDL >39 mg/dL 63   LDL Calculated 0 - 99 mg/dL 52   Hemoglobin A1C 4.8 - 5.6 % 5.5   ALBUMIN/GLOBULIN RATIO 1.2 - 2.2  2.1   WBC 3.4 - 10.8 x10E3/uL 5.6   RBC 4.14 - 5.80 x10E6/uL 5.03   Hemoglobin 13.0 - 17.7 g/dL 15.9   Hematocrit 37.5 - 51.0 % 46.5   MCV 79 - 97 fL 92   MCH 26.6 - 33.0 pg 31.6   MCHC 31.5 - 35.7 g/dL 34.2   RDW 11.6 - 15.4 % 12.0   Platelets 150 - 450 x10E3/uL 238   Neutrophils, Absolute 1.4 - 7.0 x10E3/uL 2.5   Baso (Absolute) 0.0 - 0.2 x10E3/uL 0.1   Basos Not Estab. % 1   Eos Not Estab. % 3   Eos (Absolute) 0.0 - 0.4 x10E3/uL 0.2   Immature Grans (Abs) 0.0 - 0.1 x10E3/uL 0.0   Immature Granulocytes Not Estab. % 0   Lymphs Not Estab. % 41   Lymphs (Absolute) 0.7 - 3.1 x10E3/uL 2.3   Monocytes Not Estab. % 11   Monocytes(Absolute) 0.1 - 0.9 x10E3/uL 0.6   Neutrophils Not Estab. % 44   Prostate Specific Ag, Serum 0.0 - 4.0 ng/mL 0.8   VLDL Cholesterol Dario 5 - 40 mg/dL 16        Assessment / Plan        Assessment & Plan  Routine health maintenance  Routine labs ordered (lipids by Dr. Pham).  Tdap today.  Declined flu/covid vaccines.   He will consider shingrix.   Continue healthy exercise.  Needs to improve diet. He is honest in that he doesn't see himself changing much.  Dental recommendation provided.  Orders:    CBC and Differential; Future    Comprehensive metabolic panel; Future    Hemoglobin A1c; Future    Coronary artery disease involving native coronary artery of native heart without angina pectoris  Asx.  Check labs.  Strongly encouraged him to take statin if LDL > 70.  He is willing. Wife is a bit resistant, but he would restart if recommended.       Need for Tdap vaccination    Orders:    Tdap vaccine greater than or equal to 8yo IM    Prostate cancer screening  Pros/cons of PSA testing discussed. Will proceed with  testing.    Orders:    PSA; Future        Return in about 6 months (around 7/22/2025).    Melody Murphy MD

## 2025-01-25 LAB
ALBUMIN SERPL-MCNC: 4.2 G/DL (ref 3.9–4.9)
ALP SERPL-CCNC: 96 IU/L (ref 44–121)
ALT SERPL-CCNC: 21 IU/L (ref 0–44)
AST SERPL-CCNC: 24 IU/L (ref 0–40)
BASOPHILS # BLD AUTO: 0 X10E3/UL (ref 0–0.2)
BASOPHILS NFR BLD AUTO: 1 %
BILIRUB SERPL-MCNC: 0.4 MG/DL (ref 0–1.2)
BUN SERPL-MCNC: 13 MG/DL (ref 8–27)
BUN/CREAT SERPL: 14 (ref 10–24)
CALCIUM SERPL-MCNC: 9.2 MG/DL (ref 8.6–10.2)
CHLORIDE SERPL-SCNC: 101 MMOL/L (ref 96–106)
CHOLEST SERPL-MCNC: 206 MG/DL (ref 100–199)
CO2 SERPL-SCNC: 23 MMOL/L (ref 20–29)
CREAT SERPL-MCNC: 0.96 MG/DL (ref 0.76–1.27)
EGFRCR SERPLBLD CKD-EPI 2021: 88 ML/MIN/1.73
EOSINOPHIL # BLD AUTO: 0.2 X10E3/UL (ref 0–0.4)
EOSINOPHIL NFR BLD AUTO: 3 %
ERYTHROCYTE [DISTWIDTH] IN BLOOD BY AUTOMATED COUNT: 12.3 % (ref 11.6–15.4)
GLOBULIN SER CALC-MCNC: 2.3 G/DL (ref 1.5–4.5)
GLUCOSE SERPL-MCNC: 93 MG/DL (ref 70–99)
HBA1C MFR BLD: 5.4 % (ref 4.8–5.6)
HCT VFR BLD AUTO: 44.4 % (ref 37.5–51)
HDLC SERPL-MCNC: 49 MG/DL
HGB BLD-MCNC: 14.9 G/DL (ref 13–17.7)
IMM GRANULOCYTES # BLD AUTO: 0 X10E3/UL (ref 0–0.1)
IMM GRANULOCYTES NFR BLD AUTO: 0 %
LDLC SERPL CALC-MCNC: 134 MG/DL (ref 0–99)
LYMPHOCYTES # BLD AUTO: 2.3 X10E3/UL (ref 0.7–3.1)
LYMPHOCYTES NFR BLD AUTO: 37 %
MCH RBC QN AUTO: 31 PG (ref 26.6–33)
MCHC RBC AUTO-ENTMCNC: 33.6 G/DL (ref 31.5–35.7)
MCV RBC AUTO: 93 FL (ref 79–97)
MONOCYTES # BLD AUTO: 0.7 X10E3/UL (ref 0.1–0.9)
MONOCYTES NFR BLD AUTO: 11 %
NEUTROPHILS # BLD AUTO: 2.9 X10E3/UL (ref 1.4–7)
NEUTROPHILS NFR BLD AUTO: 48 %
PLATELET # BLD AUTO: 300 X10E3/UL (ref 150–450)
POTASSIUM SERPL-SCNC: 4.6 MMOL/L (ref 3.5–5.2)
PROT SERPL-MCNC: 6.5 G/DL (ref 6–8.5)
PSA SERPL-MCNC: 0.8 NG/ML (ref 0–4)
RBC # BLD AUTO: 4.8 X10E6/UL (ref 4.14–5.8)
SODIUM SERPL-SCNC: 138 MMOL/L (ref 134–144)
TRIGL SERPL-MCNC: 130 MG/DL (ref 0–149)
VLDLC SERPL CALC-MCNC: 23 MG/DL (ref 5–40)
WBC # BLD AUTO: 6.1 X10E3/UL (ref 3.4–10.8)

## 2025-07-01 DIAGNOSIS — I25.10 CORONARY ARTERY DISEASE INVOLVING NATIVE CORONARY ARTERY OF NATIVE HEART WITHOUT ANGINA PECTORIS: ICD-10-CM

## 2025-07-01 DIAGNOSIS — E78.2 MIXED HYPERLIPIDEMIA: Primary | ICD-10-CM

## 2025-07-02 ENCOUNTER — TELEPHONE (OUTPATIENT)
Dept: CARDIOLOGY | Facility: CLINIC | Age: 65
End: 2025-07-02

## 2025-07-02 NOTE — TELEPHONE ENCOUNTER
Called patient today regarding inactive insurance. He stated he would contact his employers assisted plan and get back to me with updated information.     Access Center: When patient calls back, could you please update the insurance information and ID number in EPIC? Thank you.

## 2025-07-07 NOTE — PROGRESS NOTES
Cardiology Note       Reason for visit: Coronary artery disease.      Chidi returns today for follow-up for his coronary artery disease and hyperlipidemia.  He has been doing well without any chest discomfort or shortness of breath.  He golfs several times a week without any difficulty and has not been any effort related symptoms.    He has not had any recent hospitalizations or emergency room visits.  No neurologic or TIA-like symptoms.    On exam in the office his blood pressure is 128/78.    EKG revealed normal sinus rhythm with nonspecific T wave abnormality and was unchanged from his previous EKG.        Past Medical History:   Diagnosis Date    Mixed hyperlipidemia 6/13/2023    Palpitations 06/12/2023    SOB (shortness of breath)      Past Surgical History   Procedure Laterality Date    Appendectomy      2019    Coronary artery bypass graft  07/18/2023    robotic    Left heart cath with coronary angiography N/A 7/6/2023    Performed by West Bai MD at [facility] CARDIAC CATH/EP    OP CAB MID/THORACOTOMY ELENA, ROBOTIC Left 7/18/2023    Performed by Yvan Kaba DO at [facility] OR     Social History     Tobacco Use    Smoking status: Never    Smokeless tobacco: Never   Vaping Use    Vaping status: Never Used   Substance Use Topics    Alcohol use: Yes     Comment: rare now    Drug use: Never      Family History   Problem Relation Name Age of Onset    Multiple sclerosis Biological Mother      Hyperlipidemia Biological Father      Hypertension Biological Father      Diabetes Biological Father      Heart disease Biological Father      Diabetes Biological Sister      No Known Problems Biological Sister      No Known Problems Biological Sister      No Known Problems Biological Sister      No Known Problems Biological Brother      Colon cancer Neg Hx      Prostate cancer Neg Hx       Augmentin [amoxicillin-pot clavulanate]  Current Outpatient Medications   Medication Instructions    acetylcysteine (NAC ORAL)  1,000 mg, Daily    ascorbic acid/multivit-min (EMERGEN-C ORAL) 1 packet, Daily    aspirin 81 mg, oral, Daily    berberine capsule capsule 1 capsule, Daily    bergamot extract 500 mg capsule 1 capsule, Daily    cholecalciferol (vitamin D3) 1,000 Units, Daily    magnesium oxide (MAG-OX) 400 mg, Daily    mv-min/vit C/glut/lysine/hb124 (IMMUNE SUPPORT ORAL) 2 tablets, Daily    rosuvastatin (CRESTOR) 20 mg, Daily    sildenafiL (VIAGRA) 25 mg tablet TAKE 1 TABLET BY MOUTH DAILY AS NEEDED FOR ERECTILE DYSFUNCTION.    TURMERIC ROOT-GINGER ROOT EXT ORAL 1 tablet, Daily    vitamin B complex (B COMPLEX ORAL) 1 tablet, Daily          Review of Systems   Cardiovascular:  Negative for chest pain, dyspnea on exertion, irregular heartbeat, leg swelling, near-syncope, orthopnea, palpitations, paroxysmal nocturnal dyspnea and syncope.      Objective    Vitals:    07/08/25 1136   BP: 128/78   Pulse: (!) 56   SpO2: 96%      Wt Readings from Last 3 Encounters:   07/08/25 85.3 kg (188 lb)   01/22/25 87.1 kg (192 lb)   01/07/25 87.5 kg (193 lb)      Physical Exam  Neck:      Vascular: No carotid bruit or JVD.   Cardiovascular:      Rate and Rhythm: Normal rate and regular rhythm.      Pulses:           Carotid pulses are 2+ on the right side and 2+ on the left side.       Dorsalis pedis pulses are 2+ on the right side and 2+ on the left side.        Posterior tibial pulses are 2+ on the right side and 2+ on the left side.      Heart sounds: S1 normal and S2 normal. No murmur heard.     No friction rub. No gallop.   Pulmonary:      Effort: Pulmonary effort is normal.      Breath sounds: Normal breath sounds.   Musculoskeletal:      Right lower leg: No edema.      Left lower leg: No edema.   Skin:     General: Skin is warm and dry.   Neurological:      Mental Status: He is alert.   Psychiatric:         Behavior: Behavior normal.                           Transthoracic echocardiogram.  8/15/2023    Left Ventricle: Normal ventricle size.  Normal wall thickness. Preserved systolic function. Estimated EF 55-60%. Abnormal septal motion consistent with post-operative status. Grade I diastolic dysfunction.    Right Ventricle: Normal ventricle size. Normal systolic function.    Left Atrium: Normal sized atrium.    Right Atrium: Normal sized atrium.    Mitral Valve: Normal leaflet structure. Normal leaflet motion. No regurgitation. No stenosis.    Aortic Valve: Tricuspid valve. No regurgitation. No stenosis.    Tricuspid Valve: Normal structure. Jet is insufficient to calculate pulmonary artery pressure. Trace regurgitation.    Aorta: Aortic root normal. Sinuses of Valsalva normal-sized. Ascending aorta normal-sized.    Compared to the echocardiogram dated 7/6/2023: Abnormal septal motion is now present.        Robotic CABG.  7/18/2023  Operative Procedures Performed  OP CAB MID/THORACOTOMY CAPO ELENA Dr.        Carotid Dopplers.  7/6/2023  IMPRESSION:  Minimal plaque within the internal carotid arteries, causing less than 50%  stenosis.        Transthoracic echocardiogram.  7/6/2023  The left ventricular cavity size is normal with normal wall thickness and preserved systolic function. EF 55% by visual estimate. Difficult to assess regional wall motions despite use of Definity-Grossly normal wall motion. Normal diastolic function for age.  Aortic valve cusps poorly visualized. The visualized portions of the aortic root and ascending aorta are normal in size.  No aortic coarctation.  No aortic insufficiency or stenosis.  The mitral valve is normal. Trace mitral regurgitation.   The left atrium is normal in size.   Limited visualization but the right ventricle appears normal size with preserved systolic function.   The pulmonic valve is not well seen.   The tricuspid is normal in appearance. Trace tricuspid regurgitation with insufficient jet to estimate RVSP.    Right atrium is not well seen.  The IVC is small and collapses > 50% with  inspiration consistent with normal right atrial pressures.  No pericardial effusion.   No prior study for comparison.           Left heart cath and coronary angiography.  7/6/2023  Coronary Angiogram/Left Heart Catheterization   100% ostial LAD chronic total occlusion with left to left and right to left collaterals  Mild nonobstructive disease in the remaining coronary tree  LVEDP 13 mmHg        Exercise nuclear stress test.  6/19/2023.  This is an abnormal exercise nuclear stress test.  EKG with 2-2.5 mm upsloping ST depression in the inferolateral leads starting at 6:16 of exercise.  Lateral lead ST depression appears slightly more horizontal.  This persisted until approximately 2 minutes of recovery.  No exercise-induced arrhythmia.  Good exercise capacity.  Perfusion images are technically difficult secondary to artifact.  There is a moderate-sized area of moderately to severely reduced isotope uptake in the anteroseptum with mild reversibility at rest.  There is a small area of moderately reduced isotope uptake in the apical septum with mild improvement at rest.  The inferior wall has mildly reduced uptake on resting images only.  LVEF 55%.  Grossly normal wall motion.  Overall findings are suggestive of ischemia in the territory of the left anterior descending coronary artery.       ECG.  Sinus bradycardia, nonspecific T wave abnormality.  No change compared to previous EKG.     Assessment/Plan    Coronary artery disease involving native coronary artery of native heart  On the 18th of this month it will be 2 years since Vishnu had his robotic LIMA to his LAD.    He will continue on the aspirin and high intensity statin.    He has lost 4 pounds since his last visit.    I encouraged him to continue to be active, exercise and follow a low-fat low-cholesterol diet.    Mixed hyperlipidemia  Like is currently on rosuvastatin 20 mg daily.  He had stopped taking his statin for a while back in January and at that time  his LDL was 134 but he is now back taking it on a regular basis.    He is not having any side effects but I told him if he does not feel any muscle aches he can take co-Q10 along with his rosuvastatin.    I have given him prescription to get a follow-up lipid profile and CMP.    Overall, Chidi continues to do well.  He will be coming due for his day-to-day care.  He will return to see me in 6 months time or sooner if there is a problem.  I will continue to keep you informed of his progress.  He will call with any questions or concerns in the interim.            Thank you for allowing me to participate in the care of this patient.  If you have any questions please don't hesitate to contact me.    I spent 30 minutes on this date of service performing the following activities: obtaining history, performing examination, entering orders, documenting, preparing for visit, obtaining / reviewing records, providing counseling and education and communicating results.     Chidi Pham MD [facility]   7/8/2025  12:13 PM

## 2025-07-07 NOTE — PATIENT INSTRUCTIONS
Patient Education   Mediterranean Diet  A Mediterranean diet refers to food and lifestyle choices that are based on the traditions of countries located on the [place]. It focuses on eating more fruits, vegetables, whole grains, beans, nuts, seeds, and heart-healthy fats, and eating less dairy, meat, eggs, and processed foods with added sugar, salt, and fat. This way of eating has been shown to help prevent certain conditions and improve outcomes for people who have chronic diseases, like kidney disease and heart disease.  What are tips for following this plan?  Reading food labels  Check the serving size of packaged foods. For foods such as rice and pasta, the serving size refers to the amount of cooked product, not dry.  Check the total fat in packaged foods. Avoid foods that have saturated fat or trans fats.  Check the ingredient list for added sugars, such as corn syrup.  Shopping    Buy a variety of foods that offer a balanced diet, including:  Fresh fruits and vegetables (produce).  Grains, beans, nuts, and seeds. Some of these may be available in unpackaged forms or large amounts (in bulk).  Fresh seafood.  Poultry and eggs.  Low-fat dairy products.  Buy whole ingredients instead of prepackaged foods.  Buy fresh fruits and vegetables in-season from local [employer] markets.  Buy plain frozen fruits and vegetables.  If you do not have access to quality fresh seafood, buy precooked frozen shrimp or canned fish, such as tuna, salmon, or sardines.  Stock your pantry so you always have certain foods on hand, such as olive oil, canned tuna, canned tomatoes, rice, pasta, and beans.  Cooking  Cook foods with extra-virgin olive oil instead of using butter or other vegetable oils.  Have meat as a side dish, and have vegetables or grains as your main dish. This means having meat in small portions or adding small amounts of meat to foods like pasta or stew.  Use beans or vegetables instead of meat in common dishes  like chili or lasagna.  [place] with different cooking methods. Try roasting, broiling, steaming, and sautéing vegetables.  Add frozen vegetables to soups, stews, pasta, or rice.  Add nuts or seeds for added healthy fats and plant protein at each meal. You can add these to yogurt, salads, or vegetable dishes.  Marinate fish or vegetables using olive oil, lemon juice, garlic, and fresh herbs.  Meal planning  Plan to eat one vegetarian meal one day each week. Try to work up to two vegetarian meals, if possible.  Eat seafood two or more times a week.  Have healthy snacks readily available, such as:  Vegetable sticks with hummus.  Greek yogurt.  Fruit and nut trail mix.  Eat balanced meals throughout the week. This includes:  Fruit: 2-3 servings a day.  Vegetables: 4-5 servings a day.  Low-fat dairy: 2 servings a day.  Fish, poultry, or lean meat: 1 serving a day.  Beans and legumes: 2 or more servings a week.  Nuts and seeds: 1-2 servings a day.  Whole grains: 6-8 servings a day.  Extra-virgin olive oil: 3-4 servings a day.  Limit red meat and sweets to only a few servings a month.  Lifestyle    Cook and eat meals together with your family, when possible.  Drink enough fluid to keep your urine pale yellow.  Be physically active every day. This includes:  Aerobic exercise like running or swimming.  Leisure activities like gardening, walking, or housework.  Get 7-8 hours of sleep each night.  If recommended by your health care provider, drink red wine in moderation. This means 1 glass a day for nonpregnant women and 2 glasses a day for men. A glass of wine equals 5 oz (150 mL).  What foods should I eat?  Fruits  Apples. Apricots. Avocado. Berries. Bananas. Cherries. Dates. Figs. Grapes. Rosey. Melon. Oranges. Peaches. Plums. Pomegranate.  Vegetables  Artichokes. Beets. Broccoli. Cabbage. Carrots. Eggplant. Green beans. Chard. Kale. Spinach. Onions. Leeks. Peas. Squash. Tomatoes. Peppers.  Radishes.  Grains  Whole-grain pasta. Brown rice. Bulgur wheat. Polenta. Couscous. Whole-wheat bread. Oatmeal. Quinoa.  Meats and other proteins  Beans. Almonds. Sunflower seeds. Pine nuts. Peanuts. Cod. [place]. Scallops. Shrimp. Tuna. Tilapia. Clams. Oysters. Eggs. Poultry without skin.  Dairy  Low-fat milk. Cheese. Greek yogurt.  Fats and oils  Extra-virgin olive oil. Avocado oil. Grapeseed oil.  Beverages  Water. Red wine. Herbal tea.  Sweets and desserts  Greek yogurt with honey. Baked apples. Poached pears. Trail mix.  Seasonings and condiments  Basil. Cilantro. Coriander. Cumin. Mint. Parsley. Celso. Rosemary. Tarragon. Garlic. Oregano. Thyme. Pepper. Balsamic vinegar. Tahini. Hummus. Tomato sauce. Olives. Mushrooms.  The items listed above may not be a complete list of foods and beverages you can eat. Contact a dietitian for more information.  What foods should I limit?  This is a list of foods that should be eaten rarely or only on special occasions.  Fruits  Fruit canned in syrup.  Vegetables  Deep-fried potatoes (french fries).  Grains  Prepackaged pasta or rice dishes. Prepackaged cereal with added sugar. Prepackaged snacks with added sugar.  Meats and other proteins  Beef. Pork. Lamb. Poultry with skin. Hot dogs. [place].  Dairy  Ice cream. Sour cream. Whole milk.  Fats and oils  Butter. Canola oil. Vegetable oil. Beef fat (tallow). Lard.  Beverages  Juice. Sugar-sweetened soft drinks. Beer. Liquor and spirits.  Sweets and desserts  Cookies. Cakes. Pies. Candy.  Seasonings and condiments  Mayonnaise. Pre-made sauces and marinades.  The items listed above may not be a complete list of foods and beverages you should limit. Contact a dietitian for more information.  Summary  The Mediterranean diet includes both food and lifestyle choices.  Eat a variety of fresh fruits and vegetables, beans, nuts, seeds, and whole grains.  Limit the amount of red meat and sweets that you eat.  If recommended by your health  care provider, drink red wine in moderation. This means 1 glass a day for nonpregnant women and 2 glasses a day for men. A glass of wine equals 5 oz (150 mL).  This information is not intended to replace advice given to you by your health care provider. Make sure you discuss any questions you have with your health care provider.  Document Revised: 01/22/2021 Document Reviewed: 11/19/2020  Elsevier Patient Education © 2023 [employer].

## 2025-07-08 ENCOUNTER — OFFICE VISIT (OUTPATIENT)
Dept: CARDIOLOGY | Facility: CLINIC | Age: 65
End: 2025-07-08

## 2025-07-08 VITALS
OXYGEN SATURATION: 96 % | SYSTOLIC BLOOD PRESSURE: 128 MMHG | DIASTOLIC BLOOD PRESSURE: 78 MMHG | HEIGHT: 70 IN | BODY MASS INDEX: 26.92 KG/M2 | WEIGHT: 188 LBS | HEART RATE: 56 BPM

## 2025-07-08 DIAGNOSIS — I25.10 CORONARY ARTERY DISEASE INVOLVING NATIVE CORONARY ARTERY OF NATIVE HEART WITHOUT ANGINA PECTORIS: ICD-10-CM

## 2025-07-08 DIAGNOSIS — E78.2 MIXED HYPERLIPIDEMIA: Primary | ICD-10-CM

## 2025-07-08 LAB
ATRIAL RATE: 56
P AXIS: 54
PR INTERVAL: 172
QRS DURATION: 88
QT INTERVAL: 426
QTC CALCULATION(BAZETT): 411
R AXIS: -9
T WAVE AXIS: 108
VENTRICULAR RATE: 56

## 2025-07-08 PROCEDURE — 93000 ELECTROCARDIOGRAM COMPLETE: CPT | Performed by: INTERNAL MEDICINE

## 2025-07-08 PROCEDURE — 99213 OFFICE O/P EST LOW 20 MIN: CPT | Performed by: INTERNAL MEDICINE

## 2025-07-08 PROCEDURE — 3008F BODY MASS INDEX DOCD: CPT | Performed by: INTERNAL MEDICINE

## 2025-07-08 RX ORDER — LANOLIN ALCOHOL/MO/W.PET/CERES
400 CREAM (GRAM) TOPICAL DAILY
COMMUNITY

## 2025-07-08 RX ORDER — ROSUVASTATIN CALCIUM 20 MG/1
20 TABLET, COATED ORAL DAILY
COMMUNITY
Start: 2025-04-22

## 2025-07-08 ASSESSMENT — ENCOUNTER SYMPTOMS
PALPITATIONS: 0
DYSPNEA ON EXERTION: 0
PND: 0
NEAR-SYNCOPE: 0
SYNCOPE: 0
ORTHOPNEA: 0
IRREGULAR HEARTBEAT: 0

## 2025-07-08 NOTE — ASSESSMENT & PLAN NOTE
Like is currently on rosuvastatin 20 mg daily.  He had stopped taking his statin for a while back in January and at that time his LDL was 134 but he is now back taking it on a regular basis.    He is not having any side effects but I told him if he does not feel any muscle aches he can take co-Q10 along with his rosuvastatin.    I have given him prescription to get a follow-up lipid profile and CMP.

## 2025-07-08 NOTE — ASSESSMENT & PLAN NOTE
On the 18th of this month it will be 2 years since Vishnu had his robotic LIMA to his LAD.    He will continue on the aspirin and high intensity statin.    He has lost 4 pounds since his last visit.    I encouraged him to continue to be active, exercise and follow a low-fat low-cholesterol diet.

## 2025-07-19 LAB
ALBUMIN SERPL-MCNC: 4.2 G/DL (ref 3.9–4.9)
ALP SERPL-CCNC: 91 IU/L (ref 44–121)
ALT SERPL-CCNC: 24 IU/L (ref 0–44)
AST SERPL-CCNC: 25 IU/L (ref 0–40)
BILIRUB DIRECT SERPL-MCNC: 0.21 MG/DL (ref 0–0.4)
BILIRUB SERPL-MCNC: 0.5 MG/DL (ref 0–1.2)
BUN SERPL-MCNC: 14 MG/DL (ref 8–27)
BUN/CREAT SERPL: 12 (ref 10–24)
CALCIUM SERPL-MCNC: 9.3 MG/DL (ref 8.6–10.2)
CHLORIDE SERPL-SCNC: 102 MMOL/L (ref 96–106)
CO2 SERPL-SCNC: 22 MMOL/L (ref 20–29)
CREAT SERPL-MCNC: 1.14 MG/DL (ref 0.76–1.27)
EGFRCR SERPLBLD CKD-EPI 2021: 72 ML/MIN/1.73
GLOBULIN SER CALC-MCNC: 2.3 G/DL (ref 1.5–4.5)
GLUCOSE SERPL-MCNC: 95 MG/DL (ref 70–99)
POTASSIUM SERPL-SCNC: 4.6 MMOL/L (ref 3.5–5.2)
PROT SERPL-MCNC: 6.5 G/DL (ref 6–8.5)
SODIUM SERPL-SCNC: 139 MMOL/L (ref 134–144)

## 2025-07-21 RX ORDER — ROSUVASTATIN CALCIUM 20 MG/1
20 TABLET, COATED ORAL DAILY
Qty: 90 TABLET | Refills: 3 | Status: SHIPPED | OUTPATIENT
Start: 2025-07-21

## 2025-07-22 ENCOUNTER — OFFICE VISIT (OUTPATIENT)
Dept: FAMILY MEDICINE | Facility: CLINIC | Age: 65
End: 2025-07-22
Payer: COMMERCIAL

## 2025-07-22 VITALS
TEMPERATURE: 97.9 F | WEIGHT: 188 LBS | HEART RATE: 78 BPM | HEIGHT: 70 IN | OXYGEN SATURATION: 99 % | DIASTOLIC BLOOD PRESSURE: 74 MMHG | BODY MASS INDEX: 26.92 KG/M2 | SYSTOLIC BLOOD PRESSURE: 110 MMHG

## 2025-07-22 DIAGNOSIS — N52.01 ERECTILE DYSFUNCTION DUE TO ARTERIAL INSUFFICIENCY: ICD-10-CM

## 2025-07-22 DIAGNOSIS — Z00.00 ROUTINE HEALTH MAINTENANCE: ICD-10-CM

## 2025-07-22 DIAGNOSIS — I25.10 CORONARY ARTERY DISEASE INVOLVING NATIVE CORONARY ARTERY OF NATIVE HEART WITHOUT ANGINA PECTORIS: Primary | ICD-10-CM

## 2025-07-22 DIAGNOSIS — Z95.1 S/P CABG X 1: ICD-10-CM

## 2025-07-22 DIAGNOSIS — E78.2 MIXED HYPERLIPIDEMIA: ICD-10-CM

## 2025-07-22 PROBLEM — I97.0 POST PERICARDIOTOMY SYNDROME: Status: RESOLVED | Noted: 2023-07-26 | Resolved: 2025-07-22

## 2025-07-22 LAB
CHOLEST SERPL-MCNC: 118 MG/DL (ref 100–199)
HDLC SERPL-MCNC: 56 MG/DL
LDLC SERPL CALC-MCNC: 45 MG/DL (ref 0–99)
SPECIMEN STATUS: NORMAL
TRIGL SERPL-MCNC: 86 MG/DL (ref 0–149)
VLDLC SERPL CALC-MCNC: 17 MG/DL (ref 5–40)

## 2025-07-22 PROCEDURE — 3008F BODY MASS INDEX DOCD: CPT | Performed by: FAMILY MEDICINE

## 2025-07-22 PROCEDURE — 99214 OFFICE O/P EST MOD 30 MIN: CPT | Performed by: FAMILY MEDICINE

## 2025-07-22 ASSESSMENT — ENCOUNTER SYMPTOMS
PALPITATIONS: 0
CHILLS: 0
HEADACHES: 0
SHORTNESS OF BREATH: 0
FEVER: 0
LIGHT-HEADEDNESS: 0
COUGH: 0
DIZZINESS: 0
ROS GI COMMENTS: HERNIA

## 2025-07-22 ASSESSMENT — PATIENT HEALTH QUESTIONNAIRE - PHQ9: SUM OF ALL RESPONSES TO PHQ9 QUESTIONS 1 & 2: 0

## 2025-07-22 NOTE — ASSESSMENT & PLAN NOTE
Add on lipid panel to blood in lab.    Orders:    Comprehensive metabolic panel; Future    Lipid panel; Future    Hemoglobin A1c; Future    
As above.        
Completely asx.   Doing well with activity.  Encouraged him to get back into more intensive exercise regimen if he can.   Continue asa, statin.  Will see if we can add on his lipid panel to blood in lab.         
Due for shingrix and prevnar.  He will consider and discuss with wife.   Orders:    Comprehensive metabolic panel; Future    Lipid panel; Future    Hemoglobin A1c; Future    
Prn sildenafil.         
No

## 2025-07-22 NOTE — PROGRESS NOTES
Subjective    Chidi Ramsey is a 64 y.o. male presenting today for: Follow-up      HPI: 63yo male with h/o CAD s/p CABG 2023, HL, basal cell ca of the skin (low back) here to f/u on multiple medical problems.    CAD: Did restart statin. Tolerating fine.  Exercising regularly. Walking regularly. Golfing 1-2 times per week. Some weights.     Working at [employer].     Seeing derm yearly for h/o skin ca.       Health Maintenance Due   Topic Date Due    HIV Screening  Never done    Zoster Vaccine (1 of 2) Never done    Pneumococcal (50 years of age and older) (1 of 1 - PCV) Never done    COVID-19 Vaccine (4 - 2024-25 season) 09/01/2024     --    Patient Active Problem List   Diagnosis    Mixed hyperlipidemia    Coronary artery disease involving native coronary artery of native heart    S/P CABG x 1    Erectile dysfunction due to arterial insufficiency    Psoriasis    Routine health maintenance          Current Outpatient Medications:     acetylcysteine (NAC ORAL), Take 1,000 mg by mouth daily., Disp: , Rfl:     ascorbic acid/multivit-min (EMERGEN-C ORAL), Take 1 packet by mouth daily., Disp: , Rfl:     aspirin 81 mg chewable tablet, Take 1 tablet (81 mg total) by mouth daily., Disp: 90 tablet, Rfl: 3    berberine capsule capsule, Take 1 capsule by mouth daily., Disp: , Rfl:     bergamot extract 500 mg capsule, Take 1 capsule by mouth daily., Disp: , Rfl:     cholecalciferol, vitamin D3, 1,000 unit (25 mcg) tablet, Take 1,000 Units by mouth daily., Disp: , Rfl:     magnesium oxide (MAG-OX) 400 mg (241.3 mg magnesium) tablet, Take 100 mg by mouth nightly. Patient is taking 100 mg with dinner at night, Disp: , Rfl:     mv-min/vit C/glut/lysine/hb124 (IMMUNE SUPPORT ORAL), Take 2 tablets by mouth daily., Disp: , Rfl:     rosuvastatin (CRESTOR) 20 mg tablet, TAKE 1 TABLET BY MOUTH EVERY DAY, Disp: 90 tablet, Rfl: 3    sildenafiL (VIAGRA) 25 mg tablet, TAKE 1 TABLET BY MOUTH DAILY AS NEEDED FOR ERECTILE DYSFUNCTION.,  "Disp: 8 tablet, Rfl: 14    TURMERIC ROOT-RUPERT ROOT EXT ORAL, Take 1 tablet by mouth daily., Disp: , Rfl:     vitamin B complex (B COMPLEX ORAL), Take 1 tablet by mouth daily., Disp: , Rfl:      Allergies   Allergen Reactions    Augmentin [Amoxicillin-Pot Clavulanate] Other (see comments)     Stomach pain           Review of Systems   Constitutional:  Negative for chills and fever.   Respiratory:  Negative for cough and shortness of breath.    Cardiovascular:  Negative for chest pain and palpitations.   Gastrointestinal:         Hernia   Neurological:  Negative for dizziness, light-headedness and headaches.          Objective  Visit Vitals  /74 (BP Location: Left upper arm, Patient Position: Sitting)   Pulse 78   Temp 36.6 °C (97.9 °F) (Temporal)   Ht 1.778 m (5' 10\")   Wt 85.3 kg (188 lb)   SpO2 99%   BMI 26.98 kg/m²    Body mass index is 26.98 kg/m².    Wt Readings from Last 3 Encounters:   07/22/25 85.3 kg (188 lb)   07/08/25 85.3 kg (188 lb)   01/22/25 87.1 kg (192 lb)       BP Readings from Last 3 Encounters:   07/22/25 110/74   07/08/25 128/78   01/22/25 126/84            Physical Exam  Vitals reviewed.   Constitutional:       General: He is not in acute distress.     Appearance: Normal appearance. He is not ill-appearing.   HENT:      Head: Normocephalic and atraumatic.      Right Ear: Tympanic membrane and ear canal normal.      Left Ear: Tympanic membrane and ear canal normal.      Nose: Nose normal. No congestion or rhinorrhea.      Mouth/Throat:      Mouth: Mucous membranes are moist.      Pharynx: Oropharynx is clear. No oropharyngeal exudate.   Eyes:      General:         Right eye: No discharge.         Left eye: No discharge.      Conjunctiva/sclera: Conjunctivae normal.      Pupils: Pupils are equal, round, and reactive to light.   Cardiovascular:      Rate and Rhythm: Normal rate and regular rhythm.      Heart sounds: No murmur heard.  Pulmonary:      Effort: Pulmonary effort is normal.      " Breath sounds: Normal breath sounds. No wheezing, rhonchi or rales.   Abdominal:      General: Bowel sounds are normal. There is no distension.      Palpations: Abdomen is soft.      Tenderness: There is no abdominal tenderness.      Hernia: A hernia (3cm umbilical hernia, reducible) is present.   Musculoskeletal:         General: No swelling or deformity.      Cervical back: Normal range of motion and neck supple.   Lymphadenopathy:      Cervical: No cervical adenopathy.   Skin:     General: Skin is warm and dry.   Neurological:      General: No focal deficit present.      Mental Status: He is alert and oriented to person, place, and time.   Psychiatric:         Mood and Affect: Mood normal.         Behavior: Behavior normal.              Laboratories  Comprehensive metabolic panel: Patient Communication     Add [facility] Message   Seen     Results  Comprehensive metabolic panel [LAB17] (Order [id number])  Comprehensive metabolic panel  Order: [id number]   Status: Final result       Next appt with me: None       Dx: Mixed hyperlipidemia; Coronary artery...    Test Result Released: Yes (seen)    0 Result Notes               Component  Ref Range & Units 7/18/25 0727 1/24/25 0819 4/17/24 0804 7/26/23 0929 7/21/23 0402 7/20/23 0235 7/19/23 2107    Glucose, Serum  70 - 99 mg/dL 95 93 96 115 High  87 120 High      BUN  8 - 27 mg/dL 14 13 13 21 22 R 22 R     Creatinine, Serum  0.76 - 1.27 mg/dL 1.14 0.96 1.00 0.96 0.8 R 0.8 R     eGFR  >59 mL/min/1.73 72 88 85 89 >60.0 R >60.0 R     BUN/Creatinine Ratio  10 - 24 12 14 13 22       Sodium, Serum  134 - 144 mmol/L 139 138 141 133 Low  138 R 139 R     Potassium, Serum  3.5 - 5.2 mmol/L 4.6 4.6 4.3 4.8 3.9 R 3.9 R 3.8 R    Chloride, Serum  96 - 106 mmol/L 102 101 103 95 Low  104 R 103 R     Carbon Dioxide, Total  20 - 29 mmol/L 22 23 23 23 29 R 31 R     Calcium, Serum  8.6 - 10.2 mg/dL 9.3 9.2 9.2 9.3 8.2 Low  R 8.5 Low  R     Protein, Total, Serum  6.0 - 8.5 g/dL  6.5 6.5 6.5 6.5       Albumin, Serum  3.9 - 4.9 g/dL 4.2 4.2 4.4 4.5       Globulin, Total  1.5 - 4.5 g/dL 2.3 2.3 2.1 2.0       Bilirubin, Total  0.0 - 1.2 mg/dL 0.5 0.4 0.5 1.0       Alkaline Phosphatase, S  44 - 121 IU/L 91 96 98 93       AST (SGOT)  0 - 40 IU/L 25 24 30 35       ALT (SGPT)  0 - 44 IU/L 24 21 27 52 High                  Narrative    Performed at:  [address]  [id number]  : Ness Jacob MD, Phone:  [phone]   Specimen Collected: 07/18/25 07:27 Last Resulted: 07/19/25 03:05         Imaging/Studies         Assessment/Plan  Assessment & Plan  Coronary artery disease involving native coronary artery of native heart without angina pectoris  Completely asx.   Doing well with activity.  Encouraged him to get back into more intensive exercise regimen if he can.   Continue asa, statin.  Will see if we can add on his lipid panel to blood in lab.         S/P CABG x 1  As above.        Mixed hyperlipidemia  Add on lipid panel to blood in lab.    Orders:    Comprehensive metabolic panel; Future    Lipid panel; Future    Hemoglobin A1c; Future    Erectile dysfunction due to arterial insufficiency  Prn sildenafil.         Routine health maintenance  Due for shingrix and prevnar.  He will consider and discuss with wife.   Orders:    Comprehensive metabolic panel; Future    Lipid panel; Future    Hemoglobin A1c; Future         Return in about 6 months (around 1/22/2026).     Melody Murphy MD

## 2025-07-29 LAB — SPECIMEN STATUS: NORMAL

## 2025-08-05 LAB — SPECIMEN STATUS: NORMAL

## 2025-08-13 LAB — SPECIMEN STATUS: NORMAL

## (undated) DEVICE — DRESSING MEPILEX BORDER AG 4X4

## (undated) DEVICE — CORD MONOPOLAR ENERGY INSTRUMENT

## (undated) DEVICE — CORD BIPOLAR ENERGY INSTRUMENT

## (undated) DEVICE — APPLIER CLIP SMALL ENDOWRIST XI 8MM REPOSABLE

## (undated) DEVICE — BAG DECANTER

## (undated) DEVICE — PAD GROUND ELECTROSURGICAL W/CORD

## (undated) DEVICE — CATH DIAG OPTITORQUE 6FR TIG 4.5 RADIAL

## (undated) DEVICE — FLO-THRUS 1.5MM

## (undated) DEVICE — SUTURE SOFSILK 0 BLACK 1X30 V-20

## (undated) DEVICE — HEMOCLIPS SMALL WECK

## (undated) DEVICE — APPLIER CLIP INSTRUMENT MEDIUM

## (undated) DEVICE — VIAL DECANTER

## (undated) DEVICE — FLO THRUS 1.75MM

## (undated) DEVICE — FLO-THRUS 2.0MM

## (undated) DEVICE — GOWN SIRUS POLYRNF BRTHSLV XL 30/CS

## (undated) DEVICE — PAD DEFIB BIPHASIC HANDS FREE

## (undated) DEVICE — TUBING SMOKE EVAC PENCIL COATED

## (undated) DEVICE — COVER KIT TRANSDUCER ULTRASOUND CIV-FLEX

## (undated) DEVICE — SOLN IRRIG STER WATER 1000ML

## (undated) DEVICE — SUTURE SURGIPRO 7-0 II BLUE 1X24 CV-1

## (undated) DEVICE — KIT CATH LAB ANGIO

## (undated) DEVICE — DRESSING MEPILEX 4X4 BORDER

## (undated) DEVICE — GLIDESHEATH SLENDER SS (.021) 6FR 10CM

## (undated) DEVICE — SUTURE TICRON 2-0 BLUE 1X30 CV-316 D/A

## (undated) DEVICE — OCTOPUS NUVO

## (undated) DEVICE — DRIVER NEEDLE LRG XI REPOSABLE

## (undated) DEVICE — Device

## (undated) DEVICE — SUTURE SURGIPRO 6-0 II BLUE 1X24 CV

## (undated) DEVICE — SHEARS CRVD HOT XI  REPOSABLE

## (undated) DEVICE — SUTURE POLYSORB 2-0 VIOLET 1X30 GS-21

## (undated) DEVICE — TR BAND REGULAR

## (undated) DEVICE — BLADE MICROSHARP 3MM 15DEG 7513

## (undated) DEVICE — MISTER BLOWER

## (undated) DEVICE — KIT ACIST MULTIUSE SYRINGE

## (undated) DEVICE — SUTURE VICRYL 3-0 J416H SH UNDYED

## (undated) DEVICE — RADPAD Y FEMORAL ANGIOGRAPHY SHIELD

## (undated) DEVICE — DRAIN CHEST PLEUREVAC LATEX FREE

## (undated) DEVICE — ***USE 121412***PACK DEVICE IMPLANT TLH

## (undated) DEVICE — COVER LIGHTHANDLE

## (undated) DEVICE — SUTURE TICRON 2 BLUE 5X30 KV-37

## (undated) DEVICE — DRAPE ARM DAVINCI XI

## (undated) DEVICE — FORCEPS MICRO BIPOLAR DAVINCI2 RESUABLE

## (undated) DEVICE — SUTURE MONOCRYL 4-0 Y426H PS-2 27IN

## (undated) DEVICE — RETRACTOR SOFT TISSUE SMALL

## (undated) DEVICE — SPATULA MONOPOLAR CAUTERY PERM XI REPOSABLE

## (undated) DEVICE — GUIDEWIRE DIAGNOSTIC J .035. 150 (ORDER IN 5'S)

## (undated) DEVICE — FLOW QC KEY FOR FLOWPROBE

## (undated) DEVICE — SPONGE SINGLE TONSIL LARGE 5/PK-100

## (undated) DEVICE — APPLICATOR CHLORAPREP 26ML ORANGE TINT

## (undated) DEVICE — CLAMP BULLDOG CARDIAC DISP

## (undated) DEVICE — KIT ACIST MANIFOLD TRANSDR

## (undated) DEVICE — CONTROLLER ACIST PREMIUM HAND

## (undated) DEVICE — SOLN PLASMA-LYTE 500M

## (undated) DEVICE — CATH INTROCAN SAFETY FEP 20G X 1IN

## (undated) DEVICE — PACK RFID ROBOTIC CABG

## (undated) DEVICE — TRAY URINE METER NON-SILVER  TEMP

## (undated) DEVICE — SYSTEM VISUALIZATION CLEARIFY

## (undated) DEVICE — BLADE STERNAL SAW

## (undated) DEVICE — SEAL UNIVERSAL 5MM-8MM XI

## (undated) DEVICE — DRAPE WARMER 66X44